# Patient Record
Sex: FEMALE | Race: WHITE | NOT HISPANIC OR LATINO
[De-identification: names, ages, dates, MRNs, and addresses within clinical notes are randomized per-mention and may not be internally consistent; named-entity substitution may affect disease eponyms.]

---

## 2017-01-31 ENCOUNTER — APPOINTMENT (OUTPATIENT)
Dept: GASTROENTEROLOGY | Facility: CLINIC | Age: 42
End: 2017-01-31

## 2017-02-03 ENCOUNTER — APPOINTMENT (OUTPATIENT)
Dept: ORTHOPEDIC SURGERY | Facility: CLINIC | Age: 42
End: 2017-02-03

## 2017-02-13 ENCOUNTER — APPOINTMENT (OUTPATIENT)
Dept: ULTRASOUND IMAGING | Facility: IMAGING CENTER | Age: 42
End: 2017-02-13

## 2017-02-25 ENCOUNTER — APPOINTMENT (OUTPATIENT)
Dept: MAMMOGRAPHY | Facility: IMAGING CENTER | Age: 42
End: 2017-02-25

## 2017-02-27 ENCOUNTER — APPOINTMENT (OUTPATIENT)
Dept: PAIN MANAGEMENT | Facility: CLINIC | Age: 42
End: 2017-02-27

## 2017-02-28 ENCOUNTER — APPOINTMENT (OUTPATIENT)
Dept: PAIN MANAGEMENT | Facility: CLINIC | Age: 42
End: 2017-02-28

## 2017-03-01 ENCOUNTER — APPOINTMENT (OUTPATIENT)
Dept: GASTROENTEROLOGY | Facility: CLINIC | Age: 42
End: 2017-03-01

## 2017-03-08 ENCOUNTER — FORM ENCOUNTER (OUTPATIENT)
Age: 42
End: 2017-03-08

## 2017-03-09 ENCOUNTER — APPOINTMENT (OUTPATIENT)
Dept: MRI IMAGING | Facility: IMAGING CENTER | Age: 42
End: 2017-03-09

## 2017-03-09 ENCOUNTER — OUTPATIENT (OUTPATIENT)
Dept: OUTPATIENT SERVICES | Facility: HOSPITAL | Age: 42
LOS: 1 days | End: 2017-03-09
Payer: COMMERCIAL

## 2017-03-09 DIAGNOSIS — D25.9 LEIOMYOMA OF UTERUS, UNSPECIFIED: Chronic | ICD-10-CM

## 2017-03-09 DIAGNOSIS — Z00.8 ENCOUNTER FOR OTHER GENERAL EXAMINATION: ICD-10-CM

## 2017-03-09 DIAGNOSIS — N84.0 POLYP OF CORPUS UTERI: Chronic | ICD-10-CM

## 2017-03-09 DIAGNOSIS — S82.90XA UNSPECIFIED FRACTURE OF UNSPECIFIED LOWER LEG, INITIAL ENCOUNTER FOR CLOSED FRACTURE: Chronic | ICD-10-CM

## 2017-03-09 DIAGNOSIS — Z98.1 ARTHRODESIS STATUS: Chronic | ICD-10-CM

## 2017-03-09 PROCEDURE — 72148 MRI LUMBAR SPINE W/O DYE: CPT

## 2017-03-24 ENCOUNTER — FORM ENCOUNTER (OUTPATIENT)
Age: 42
End: 2017-03-24

## 2017-03-25 ENCOUNTER — OUTPATIENT (OUTPATIENT)
Dept: OUTPATIENT SERVICES | Facility: HOSPITAL | Age: 42
LOS: 1 days | End: 2017-03-25
Payer: COMMERCIAL

## 2017-03-25 ENCOUNTER — APPOINTMENT (OUTPATIENT)
Dept: ULTRASOUND IMAGING | Facility: IMAGING CENTER | Age: 42
End: 2017-03-25

## 2017-03-25 ENCOUNTER — APPOINTMENT (OUTPATIENT)
Dept: MAMMOGRAPHY | Facility: IMAGING CENTER | Age: 42
End: 2017-03-25

## 2017-03-25 DIAGNOSIS — N84.0 POLYP OF CORPUS UTERI: Chronic | ICD-10-CM

## 2017-03-25 DIAGNOSIS — D25.9 LEIOMYOMA OF UTERUS, UNSPECIFIED: Chronic | ICD-10-CM

## 2017-03-25 DIAGNOSIS — Z98.1 ARTHRODESIS STATUS: Chronic | ICD-10-CM

## 2017-03-25 DIAGNOSIS — S82.90XA UNSPECIFIED FRACTURE OF UNSPECIFIED LOWER LEG, INITIAL ENCOUNTER FOR CLOSED FRACTURE: Chronic | ICD-10-CM

## 2017-03-25 DIAGNOSIS — Z00.8 ENCOUNTER FOR OTHER GENERAL EXAMINATION: ICD-10-CM

## 2017-03-25 PROCEDURE — 76856 US EXAM PELVIC COMPLETE: CPT

## 2017-03-25 PROCEDURE — 76641 ULTRASOUND BREAST COMPLETE: CPT

## 2017-03-25 PROCEDURE — 77067 SCR MAMMO BI INCL CAD: CPT

## 2017-03-25 PROCEDURE — 76830 TRANSVAGINAL US NON-OB: CPT

## 2017-03-25 PROCEDURE — 77063 BREAST TOMOSYNTHESIS BI: CPT

## 2017-03-26 ENCOUNTER — RESULT REVIEW (OUTPATIENT)
Age: 42
End: 2017-03-26

## 2017-03-27 ENCOUNTER — APPOINTMENT (OUTPATIENT)
Dept: GASTROENTEROLOGY | Facility: CLINIC | Age: 42
End: 2017-03-27

## 2017-03-27 ENCOUNTER — APPOINTMENT (OUTPATIENT)
Dept: ORTHOPEDIC SURGERY | Facility: CLINIC | Age: 42
End: 2017-03-27

## 2017-04-13 ENCOUNTER — APPOINTMENT (OUTPATIENT)
Dept: GASTROENTEROLOGY | Facility: CLINIC | Age: 42
End: 2017-04-13

## 2017-04-21 ENCOUNTER — APPOINTMENT (OUTPATIENT)
Dept: PAIN MANAGEMENT | Facility: CLINIC | Age: 42
End: 2017-04-21

## 2017-04-21 VITALS
BODY MASS INDEX: 23.09 KG/M2 | HEART RATE: 76 BPM | WEIGHT: 110 LBS | HEIGHT: 58 IN | SYSTOLIC BLOOD PRESSURE: 110 MMHG | DIASTOLIC BLOOD PRESSURE: 70 MMHG

## 2017-04-21 RX ORDER — ALBUTEROL SULFATE 90 UG/1
108 (90 BASE) AEROSOL, METERED RESPIRATORY (INHALATION)
Qty: 18 | Refills: 0 | Status: DISCONTINUED | COMMUNITY
Start: 2017-03-31

## 2017-04-21 RX ORDER — LANSOPRAZOLE 30 MG/1
30 CAPSULE, DELAYED RELEASE ORAL
Qty: 20 | Refills: 0 | Status: DISCONTINUED | COMMUNITY
Start: 2017-04-04

## 2017-04-21 RX ORDER — CLARITHROMYCIN 500 MG/1
500 TABLET, FILM COATED ORAL
Qty: 20 | Refills: 0 | Status: DISCONTINUED | COMMUNITY
Start: 2017-03-31

## 2017-04-21 RX ORDER — FLUCONAZOLE 150 MG/1
150 TABLET ORAL
Qty: 2 | Refills: 0 | Status: DISCONTINUED | COMMUNITY
Start: 2017-04-09

## 2017-04-21 RX ORDER — OSELTAMIVIR PHOSPHATE 75 MG/1
75 CAPSULE ORAL
Qty: 10 | Refills: 0 | Status: DISCONTINUED | COMMUNITY
Start: 2017-03-27

## 2017-04-21 RX ORDER — AMOXICILLIN 500 MG/1
500 CAPSULE ORAL
Qty: 40 | Refills: 0 | Status: DISCONTINUED | COMMUNITY
Start: 2017-03-31

## 2017-04-26 ENCOUNTER — RESULT REVIEW (OUTPATIENT)
Age: 42
End: 2017-04-26

## 2017-04-27 ENCOUNTER — LABORATORY RESULT (OUTPATIENT)
Age: 42
End: 2017-04-27

## 2017-04-27 ENCOUNTER — APPOINTMENT (OUTPATIENT)
Dept: OBGYN | Facility: CLINIC | Age: 42
End: 2017-04-27

## 2017-04-27 VITALS — WEIGHT: 103 LBS | DIASTOLIC BLOOD PRESSURE: 69 MMHG | BODY MASS INDEX: 21.53 KG/M2 | SYSTOLIC BLOOD PRESSURE: 108 MMHG

## 2017-05-01 ENCOUNTER — APPOINTMENT (OUTPATIENT)
Dept: MRI IMAGING | Facility: CLINIC | Age: 42
End: 2017-05-01

## 2017-05-01 ENCOUNTER — APPOINTMENT (OUTPATIENT)
Dept: ORTHOPEDIC SURGERY | Facility: CLINIC | Age: 42
End: 2017-05-01

## 2017-05-01 VITALS — WEIGHT: 103 LBS | BODY MASS INDEX: 21.62 KG/M2 | HEIGHT: 58 IN

## 2017-05-02 ENCOUNTER — APPOINTMENT (OUTPATIENT)
Dept: GASTROENTEROLOGY | Facility: CLINIC | Age: 42
End: 2017-05-02

## 2017-05-09 ENCOUNTER — APPOINTMENT (OUTPATIENT)
Dept: GASTROENTEROLOGY | Facility: CLINIC | Age: 42
End: 2017-05-09

## 2017-05-16 ENCOUNTER — APPOINTMENT (OUTPATIENT)
Dept: GASTROENTEROLOGY | Facility: CLINIC | Age: 42
End: 2017-05-16

## 2017-05-25 ENCOUNTER — APPOINTMENT (OUTPATIENT)
Dept: OBGYN | Facility: CLINIC | Age: 42
End: 2017-05-25

## 2017-05-30 ENCOUNTER — APPOINTMENT (OUTPATIENT)
Dept: NEUROSURGERY | Facility: CLINIC | Age: 42
End: 2017-05-30

## 2017-06-01 ENCOUNTER — APPOINTMENT (OUTPATIENT)
Dept: OBGYN | Facility: CLINIC | Age: 42
End: 2017-06-01
Payer: MEDICARE

## 2017-06-01 PROCEDURE — 76830 TRANSVAGINAL US NON-OB: CPT

## 2017-07-18 ENCOUNTER — APPOINTMENT (OUTPATIENT)
Dept: GASTROENTEROLOGY | Facility: CLINIC | Age: 42
End: 2017-07-18

## 2017-09-08 ENCOUNTER — APPOINTMENT (OUTPATIENT)
Dept: OBGYN | Facility: CLINIC | Age: 42
End: 2017-09-08
Payer: MEDICARE

## 2017-09-08 ENCOUNTER — ASOB RESULT (OUTPATIENT)
Age: 42
End: 2017-09-08

## 2017-09-08 VITALS
HEIGHT: 58 IN | DIASTOLIC BLOOD PRESSURE: 80 MMHG | BODY MASS INDEX: 21.2 KG/M2 | WEIGHT: 101 LBS | SYSTOLIC BLOOD PRESSURE: 120 MMHG

## 2017-09-08 PROCEDURE — 99213 OFFICE O/P EST LOW 20 MIN: CPT

## 2017-09-08 PROCEDURE — 76830 TRANSVAGINAL US NON-OB: CPT

## 2017-09-22 ENCOUNTER — APPOINTMENT (OUTPATIENT)
Dept: PAIN MANAGEMENT | Facility: CLINIC | Age: 42
End: 2017-09-22
Payer: MEDICARE

## 2017-09-22 VITALS
HEIGHT: 58 IN | BODY MASS INDEX: 21.2 KG/M2 | DIASTOLIC BLOOD PRESSURE: 72 MMHG | HEART RATE: 85 BPM | SYSTOLIC BLOOD PRESSURE: 105 MMHG | WEIGHT: 101 LBS

## 2017-09-22 PROCEDURE — 99213 OFFICE O/P EST LOW 20 MIN: CPT | Mod: 25

## 2017-09-22 PROCEDURE — 64615 CHEMODENERV MUSC MIGRAINE: CPT

## 2017-09-25 ENCOUNTER — RX RENEWAL (OUTPATIENT)
Age: 42
End: 2017-09-25

## 2017-09-27 ENCOUNTER — APPOINTMENT (OUTPATIENT)
Dept: GASTROENTEROLOGY | Facility: CLINIC | Age: 42
End: 2017-09-27
Payer: MEDICARE

## 2017-09-27 PROCEDURE — 45378 DIAGNOSTIC COLONOSCOPY: CPT

## 2017-09-28 ENCOUNTER — APPOINTMENT (OUTPATIENT)
Dept: ULTRASOUND IMAGING | Facility: IMAGING CENTER | Age: 42
End: 2017-09-28
Payer: MEDICARE

## 2017-09-28 ENCOUNTER — OUTPATIENT (OUTPATIENT)
Dept: OUTPATIENT SERVICES | Facility: HOSPITAL | Age: 42
LOS: 1 days | End: 2017-09-28
Payer: COMMERCIAL

## 2017-09-28 DIAGNOSIS — Z00.8 ENCOUNTER FOR OTHER GENERAL EXAMINATION: ICD-10-CM

## 2017-09-28 DIAGNOSIS — N84.0 POLYP OF CORPUS UTERI: Chronic | ICD-10-CM

## 2017-09-28 DIAGNOSIS — Z98.1 ARTHRODESIS STATUS: Chronic | ICD-10-CM

## 2017-09-28 DIAGNOSIS — D25.9 LEIOMYOMA OF UTERUS, UNSPECIFIED: Chronic | ICD-10-CM

## 2017-09-28 DIAGNOSIS — S82.90XA UNSPECIFIED FRACTURE OF UNSPECIFIED LOWER LEG, INITIAL ENCOUNTER FOR CLOSED FRACTURE: Chronic | ICD-10-CM

## 2017-09-28 PROCEDURE — 76642 ULTRASOUND BREAST LIMITED: CPT | Mod: 26,50

## 2017-09-28 PROCEDURE — 76642 ULTRASOUND BREAST LIMITED: CPT

## 2017-10-10 ENCOUNTER — OUTPATIENT (OUTPATIENT)
Dept: OUTPATIENT SERVICES | Facility: HOSPITAL | Age: 42
LOS: 1 days | End: 2017-10-10
Payer: COMMERCIAL

## 2017-10-10 ENCOUNTER — TRANSCRIPTION ENCOUNTER (OUTPATIENT)
Age: 42
End: 2017-10-10

## 2017-10-10 VITALS
HEIGHT: 59 IN | RESPIRATION RATE: 16 BRPM | HEART RATE: 68 BPM | OXYGEN SATURATION: 100 % | DIASTOLIC BLOOD PRESSURE: 63 MMHG | TEMPERATURE: 99 F | WEIGHT: 104.94 LBS | SYSTOLIC BLOOD PRESSURE: 108 MMHG

## 2017-10-10 DIAGNOSIS — R10.2 PELVIC AND PERINEAL PAIN: ICD-10-CM

## 2017-10-10 DIAGNOSIS — Z01.812 ENCOUNTER FOR PREPROCEDURAL LABORATORY EXAMINATION: ICD-10-CM

## 2017-10-10 DIAGNOSIS — S82.90XA UNSPECIFIED FRACTURE OF UNSPECIFIED LOWER LEG, INITIAL ENCOUNTER FOR CLOSED FRACTURE: Chronic | ICD-10-CM

## 2017-10-10 DIAGNOSIS — Z98.890 OTHER SPECIFIED POSTPROCEDURAL STATES: Chronic | ICD-10-CM

## 2017-10-10 DIAGNOSIS — D25.9 LEIOMYOMA OF UTERUS, UNSPECIFIED: Chronic | ICD-10-CM

## 2017-10-10 DIAGNOSIS — N84.0 POLYP OF CORPUS UTERI: Chronic | ICD-10-CM

## 2017-10-10 DIAGNOSIS — Z98.1 ARTHRODESIS STATUS: Chronic | ICD-10-CM

## 2017-10-10 DIAGNOSIS — Z98.51 TUBAL LIGATION STATUS: Chronic | ICD-10-CM

## 2017-10-10 PROCEDURE — G0463: CPT

## 2017-10-10 PROCEDURE — 93005 ELECTROCARDIOGRAM TRACING: CPT

## 2017-10-10 RX ORDER — SODIUM CHLORIDE 9 MG/ML
3 INJECTION INTRAMUSCULAR; INTRAVENOUS; SUBCUTANEOUS EVERY 8 HOURS
Qty: 0 | Refills: 0 | Status: DISCONTINUED | OUTPATIENT
Start: 2017-10-17 | End: 2017-10-25

## 2017-10-10 NOTE — H&P PST ADULT - PMH
Fibrocystic breast    Fibromyalgia    Herniated lumbar intervertebral disc    Migraines    Ovarian cyst    Psoriasis    Tibial fracture    Uterine fibroid    Uterine polyp Endometriosis    Fibrocystic breast    Fibromyalgia    Herniated lumbar intervertebral disc    Migraines    Ovarian cyst    Psoriasis    Tibial fracture    Uterine fibroid    Uterine polyp

## 2017-10-10 NOTE — H&P PST ADULT - HISTORY OF PRESENT ILLNESS
42year old female with pmhx of fibromyalgia, migraine headaches, uterine fibroids, ovarian cysts, lumbar herniated disc and psoriasis presents with c/o lower abdominal pain x 1month that is not relieved with analgesics. Patient is here today for presurgical testing for scheduled diagnostic laparoscopy, laparoscopic uterosacral nerve ablation on 10/17/17 42year old female with pmhx of fibromyalgia, migraine headaches, endometriosis, uterine fibroids, ovarian cysts, lumbar herniated disc and psoriasis presents with c/o lower abdominal pain x 1month that is not relieved with analgesics. Patient is here today for presurgical testing for scheduled diagnostic laparoscopy, laparoscopic uterosacral nerve ablation on 10/17/17

## 2017-10-10 NOTE — H&P PST ADULT - FAMILY HISTORY
Father  Still living? Yes, Estimated age: Age Unknown  Family history of hypertension, Age at diagnosis: Age Unknown     Mother  Still living? Yes, Estimated age: Age Unknown  Diabetes mellitus, type 2, Age at diagnosis: Age Unknown

## 2017-10-17 ENCOUNTER — OUTPATIENT (OUTPATIENT)
Dept: OUTPATIENT SERVICES | Facility: HOSPITAL | Age: 42
LOS: 1 days | Discharge: HOME | End: 2017-10-17
Payer: COMMERCIAL

## 2017-10-17 ENCOUNTER — TRANSCRIPTION ENCOUNTER (OUTPATIENT)
Age: 42
End: 2017-10-17

## 2017-10-17 ENCOUNTER — APPOINTMENT (OUTPATIENT)
Dept: OBGYN | Facility: HOSPITAL | Age: 42
End: 2017-10-17

## 2017-10-17 VITALS
SYSTOLIC BLOOD PRESSURE: 102 MMHG | HEART RATE: 71 BPM | WEIGHT: 104.94 LBS | HEIGHT: 59 IN | DIASTOLIC BLOOD PRESSURE: 68 MMHG | OXYGEN SATURATION: 100 % | RESPIRATION RATE: 16 BRPM | TEMPERATURE: 97 F

## 2017-10-17 VITALS
OXYGEN SATURATION: 99 % | RESPIRATION RATE: 12 BRPM | DIASTOLIC BLOOD PRESSURE: 58 MMHG | TEMPERATURE: 98 F | HEART RATE: 77 BPM | SYSTOLIC BLOOD PRESSURE: 101 MMHG

## 2017-10-17 DIAGNOSIS — R10.2 PELVIC AND PERINEAL PAIN: ICD-10-CM

## 2017-10-17 DIAGNOSIS — S82.90XA UNSPECIFIED FRACTURE OF UNSPECIFIED LOWER LEG, INITIAL ENCOUNTER FOR CLOSED FRACTURE: Chronic | ICD-10-CM

## 2017-10-17 DIAGNOSIS — Z98.890 OTHER SPECIFIED POSTPROCEDURAL STATES: Chronic | ICD-10-CM

## 2017-10-17 DIAGNOSIS — Z98.51 TUBAL LIGATION STATUS: Chronic | ICD-10-CM

## 2017-10-17 DIAGNOSIS — D25.9 LEIOMYOMA OF UTERUS, UNSPECIFIED: Chronic | ICD-10-CM

## 2017-10-17 DIAGNOSIS — N84.0 POLYP OF CORPUS UTERI: Chronic | ICD-10-CM

## 2017-10-17 DIAGNOSIS — Z98.1 ARTHRODESIS STATUS: Chronic | ICD-10-CM

## 2017-10-17 LAB
APTT BLD: 31.6 SEC — SIGNIFICANT CHANGE UP (ref 27.5–37.4)
HCG UR QL: NEGATIVE — SIGNIFICANT CHANGE UP
INR BLD: 1.03 RATIO — SIGNIFICANT CHANGE UP (ref 0.88–1.16)
PROTHROM AB SERPL-ACNC: 11.2 SEC — SIGNIFICANT CHANGE UP (ref 9.8–12.7)

## 2017-10-17 PROCEDURE — 58578 UNLISTED LAPS PX UTERUS: CPT

## 2017-10-17 PROCEDURE — 86900 BLOOD TYPING SEROLOGIC ABO: CPT

## 2017-10-17 PROCEDURE — 86901 BLOOD TYPING SEROLOGIC RH(D): CPT

## 2017-10-17 PROCEDURE — 86850 RBC ANTIBODY SCREEN: CPT

## 2017-10-17 PROCEDURE — 85610 PROTHROMBIN TIME: CPT

## 2017-10-17 PROCEDURE — 81025 URINE PREGNANCY TEST: CPT

## 2017-10-17 PROCEDURE — 85730 THROMBOPLASTIN TIME PARTIAL: CPT

## 2017-10-17 RX ORDER — ACETAMINOPHEN 500 MG
1000 TABLET ORAL ONCE
Qty: 0 | Refills: 0 | Status: COMPLETED | OUTPATIENT
Start: 2017-10-17 | End: 2017-10-17

## 2017-10-17 RX ORDER — KETOROLAC TROMETHAMINE 30 MG/ML
30 SYRINGE (ML) INJECTION ONCE
Qty: 0 | Refills: 0 | Status: DISCONTINUED | OUTPATIENT
Start: 2017-10-17 | End: 2017-10-25

## 2017-10-17 RX ORDER — OXYCODONE AND ACETAMINOPHEN 5; 325 MG/1; MG/1
2 TABLET ORAL EVERY 4 HOURS
Qty: 0 | Refills: 0 | Status: DISCONTINUED | OUTPATIENT
Start: 2017-10-17 | End: 2017-10-17

## 2017-10-17 RX ORDER — CHOLECALCIFEROL (VITAMIN D3) 125 MCG
1 CAPSULE ORAL
Qty: 0 | Refills: 0 | COMMUNITY

## 2017-10-17 RX ORDER — EVENING PRIMROSE OIL 500 MG
0 CAPSULE ORAL
Qty: 0 | Refills: 0 | COMMUNITY

## 2017-10-17 RX ORDER — ONDANSETRON 8 MG/1
4 TABLET, FILM COATED ORAL ONCE
Qty: 0 | Refills: 0 | Status: COMPLETED | OUTPATIENT
Start: 2017-10-17 | End: 2017-10-17

## 2017-10-17 RX ORDER — HYDROMORPHONE HYDROCHLORIDE 2 MG/ML
0.5 INJECTION INTRAMUSCULAR; INTRAVENOUS; SUBCUTANEOUS
Qty: 0 | Refills: 0 | Status: DISCONTINUED | OUTPATIENT
Start: 2017-10-17 | End: 2017-10-17

## 2017-10-17 RX ORDER — SODIUM CHLORIDE 9 MG/ML
250 INJECTION, SOLUTION INTRAVENOUS ONCE
Qty: 0 | Refills: 0 | Status: COMPLETED | OUTPATIENT
Start: 2017-10-17 | End: 2017-10-17

## 2017-10-17 RX ORDER — MOXIFLOXACIN HYDROCHLORIDE TABLETS, 400 MG 400 MG/1
1 TABLET, FILM COATED ORAL
Qty: 14 | Refills: 0
Start: 2017-10-17 | End: 2017-10-24

## 2017-10-17 RX ADMIN — HYDROMORPHONE HYDROCHLORIDE 0.5 MILLIGRAM(S): 2 INJECTION INTRAMUSCULAR; INTRAVENOUS; SUBCUTANEOUS at 15:16

## 2017-10-17 RX ADMIN — Medication 400 MILLIGRAM(S): at 14:56

## 2017-10-17 RX ADMIN — SODIUM CHLORIDE 1000 MILLILITER(S): 9 INJECTION, SOLUTION INTRAVENOUS at 17:10

## 2017-10-17 RX ADMIN — ONDANSETRON 4 MILLIGRAM(S): 8 TABLET, FILM COATED ORAL at 15:18

## 2017-10-17 RX ADMIN — SODIUM CHLORIDE 3 MILLILITER(S): 9 INJECTION INTRAMUSCULAR; INTRAVENOUS; SUBCUTANEOUS at 11:46

## 2017-10-17 RX ADMIN — HYDROMORPHONE HYDROCHLORIDE 0.5 MILLIGRAM(S): 2 INJECTION INTRAMUSCULAR; INTRAVENOUS; SUBCUTANEOUS at 16:24

## 2017-10-17 RX ADMIN — Medication 1000 MILLIGRAM(S): at 15:20

## 2017-10-17 NOTE — ASU DISCHARGE PLAN (ADULT/PEDIATRIC). - NOTIFY
Pain not relieved by Medications/Unable to Urinate/Bleeding that does not stop/Persistent Nausea and Vomiting/Inability to Tolerate Liquids or Foods/GYN Fever>100.4

## 2017-10-17 NOTE — ASU DISCHARGE PLAN (ADULT/PEDIATRIC). - MEDICATION SUMMARY - MEDICATIONS TO TAKE
I will START or STAY ON the medications listed below when I get home from the hospital:    Aleve 220 mg oral capsule  -- 2 cap(s) by mouth every 12 hours, As Needed  -- Indication: For Pain    Cipro 500 mg oral tablet  -- 1 tab(s) by mouth every 12 hours   -- Avoid prolonged or excessive exposure to direct and/or artificial sunlight while taking this medication.  Check with your doctor before becoming pregnant.  Do not take dairy products, antacids, or iron preparations within one hour of this medication.  Finish all this medication unless otherwise directed by prescriber.  Medication should be taken with plenty of water.    -- Indication: For Prophylaxis

## 2017-10-17 NOTE — ASU PATIENT PROFILE, ADULT - PSH
Fibroids  removal- June 2014  History of bilateral tubal ligation    History of dilation and curettage    History of myomectomy    Lower leg fracture  bilateral tib fib fx surgery- 1999  S/P spinal fusion  and laminectomy with hardware- 2009  Uterine polyp  lap uterine polyp removal- january 2014

## 2017-10-17 NOTE — ASU PATIENT PROFILE, ADULT - PMH
Endometriosis    Fibrocystic breast    Fibromyalgia    Herniated lumbar intervertebral disc    Migraines    Ovarian cyst    Psoriasis    Tibial fracture    Uterine fibroid    Uterine polyp

## 2017-10-17 NOTE — BRIEF OPERATIVE NOTE - PROCEDURE
<<-----Click on this checkbox to enter Procedure Diagnostic laparoscopy for chronic pelvic pain  10/17/2017  + uterosacral nerve ablation  Active  [id number]

## 2017-10-17 NOTE — ASU DISCHARGE PLAN (ADULT/PEDIATRIC). - ACTIVITY LEVEL
no douching/no intercourse/no heavy lifting/no weight bearing/no sports/gym/no exercise/no tub baths/nothing per vagina/nothing per rectum/no tampons

## 2017-10-17 NOTE — BRIEF OPERATIVE NOTE - OPERATION/FINDINGS
6wk size myomatous uterus w/ some powder burn lesions consistent w/ endometriosis,  unremarkable bilateral adnexa 6wk size myomatous uterus w/ some powder burn lesions consistent w/ endometriosis,  unremarkable bilateral adnexa    there was perforation of uterus at right fundal area w/ humi manipulator during insertion, at end of case humi manipulator was removed and perforated area was observed under lower pressure and was not bleeding.

## 2017-10-25 DIAGNOSIS — N80.0 ENDOMETRIOSIS OF UTERUS: ICD-10-CM

## 2017-10-25 DIAGNOSIS — Z87.891 PERSONAL HISTORY OF NICOTINE DEPENDENCE: ICD-10-CM

## 2017-11-01 ENCOUNTER — APPOINTMENT (OUTPATIENT)
Dept: OBGYN | Facility: CLINIC | Age: 42
End: 2017-11-01
Payer: MEDICARE

## 2017-11-01 VITALS — WEIGHT: 102 LBS | SYSTOLIC BLOOD PRESSURE: 100 MMHG | BODY MASS INDEX: 21.32 KG/M2 | DIASTOLIC BLOOD PRESSURE: 70 MMHG

## 2017-11-01 PROCEDURE — 99024 POSTOP FOLLOW-UP VISIT: CPT

## 2017-11-22 ENCOUNTER — RX RENEWAL (OUTPATIENT)
Age: 42
End: 2017-11-22

## 2018-01-03 ENCOUNTER — APPOINTMENT (OUTPATIENT)
Dept: PAIN MANAGEMENT | Facility: CLINIC | Age: 43
End: 2018-01-03
Payer: MEDICARE

## 2018-01-03 VITALS
WEIGHT: 102 LBS | DIASTOLIC BLOOD PRESSURE: 72 MMHG | BODY MASS INDEX: 21.41 KG/M2 | SYSTOLIC BLOOD PRESSURE: 112 MMHG | HEART RATE: 76 BPM | HEIGHT: 58 IN

## 2018-01-03 PROCEDURE — 64615 CHEMODENERV MUSC MIGRAINE: CPT

## 2018-01-03 PROCEDURE — 99213 OFFICE O/P EST LOW 20 MIN: CPT | Mod: 25

## 2018-02-09 ENCOUNTER — APPOINTMENT (OUTPATIENT)
Dept: PAIN MANAGEMENT | Facility: CLINIC | Age: 43
End: 2018-02-09
Payer: MEDICARE

## 2018-02-09 VITALS
HEIGHT: 58 IN | WEIGHT: 102 LBS | BODY MASS INDEX: 21.41 KG/M2 | DIASTOLIC BLOOD PRESSURE: 77 MMHG | HEART RATE: 80 BPM | SYSTOLIC BLOOD PRESSURE: 112 MMHG

## 2018-02-09 PROCEDURE — 99213 OFFICE O/P EST LOW 20 MIN: CPT

## 2018-02-09 RX ORDER — AMOXICILLIN 875 MG/1
875 TABLET, FILM COATED ORAL
Qty: 10 | Refills: 0 | Status: DISCONTINUED | COMMUNITY
Start: 2017-09-11

## 2018-02-09 RX ORDER — FLUCONAZOLE 150 MG/1
150 TABLET ORAL
Qty: 1 | Refills: 3 | Status: DISCONTINUED | COMMUNITY
Start: 2017-04-27 | End: 2018-02-09

## 2018-02-12 ENCOUNTER — APPOINTMENT (OUTPATIENT)
Dept: ORTHOPEDIC SURGERY | Facility: CLINIC | Age: 43
End: 2018-02-12
Payer: MEDICARE

## 2018-02-12 VITALS — SYSTOLIC BLOOD PRESSURE: 129 MMHG | HEART RATE: 62 BPM | DIASTOLIC BLOOD PRESSURE: 84 MMHG

## 2018-02-12 DIAGNOSIS — M54.5 LOW BACK PAIN: ICD-10-CM

## 2018-02-12 PROCEDURE — 20600 DRAIN/INJ JOINT/BURSA W/O US: CPT

## 2018-02-12 PROCEDURE — 99214 OFFICE O/P EST MOD 30 MIN: CPT | Mod: 25

## 2018-03-05 ENCOUNTER — OTHER (OUTPATIENT)
Age: 43
End: 2018-03-05

## 2018-04-06 ENCOUNTER — APPOINTMENT (OUTPATIENT)
Dept: PAIN MANAGEMENT | Facility: CLINIC | Age: 43
End: 2018-04-06
Payer: MEDICARE

## 2018-04-06 VITALS
HEIGHT: 59 IN | DIASTOLIC BLOOD PRESSURE: 78 MMHG | BODY MASS INDEX: 20.56 KG/M2 | HEART RATE: 79 BPM | SYSTOLIC BLOOD PRESSURE: 122 MMHG | WEIGHT: 102 LBS

## 2018-04-06 PROCEDURE — 64615 CHEMODENERV MUSC MIGRAINE: CPT

## 2018-04-06 PROCEDURE — 99213 OFFICE O/P EST LOW 20 MIN: CPT | Mod: 25

## 2018-04-13 ENCOUNTER — APPOINTMENT (OUTPATIENT)
Dept: MAMMOGRAPHY | Facility: CLINIC | Age: 43
End: 2018-04-13
Payer: MEDICARE

## 2018-04-13 ENCOUNTER — OUTPATIENT (OUTPATIENT)
Dept: OUTPATIENT SERVICES | Facility: HOSPITAL | Age: 43
LOS: 1 days | End: 2018-04-13
Payer: COMMERCIAL

## 2018-04-13 ENCOUNTER — APPOINTMENT (OUTPATIENT)
Dept: ULTRASOUND IMAGING | Facility: CLINIC | Age: 43
End: 2018-04-13
Payer: MEDICARE

## 2018-04-13 DIAGNOSIS — D25.9 LEIOMYOMA OF UTERUS, UNSPECIFIED: Chronic | ICD-10-CM

## 2018-04-13 DIAGNOSIS — Z98.1 ARTHRODESIS STATUS: Chronic | ICD-10-CM

## 2018-04-13 DIAGNOSIS — Z98.890 OTHER SPECIFIED POSTPROCEDURAL STATES: Chronic | ICD-10-CM

## 2018-04-13 DIAGNOSIS — S82.90XA UNSPECIFIED FRACTURE OF UNSPECIFIED LOWER LEG, INITIAL ENCOUNTER FOR CLOSED FRACTURE: Chronic | ICD-10-CM

## 2018-04-13 DIAGNOSIS — Z12.31 ENCOUNTER FOR SCREENING MAMMOGRAM FOR MALIGNANT NEOPLASM OF BREAST: ICD-10-CM

## 2018-04-13 DIAGNOSIS — Z98.51 TUBAL LIGATION STATUS: Chronic | ICD-10-CM

## 2018-04-13 DIAGNOSIS — N84.0 POLYP OF CORPUS UTERI: Chronic | ICD-10-CM

## 2018-04-13 DIAGNOSIS — Z00.8 ENCOUNTER FOR OTHER GENERAL EXAMINATION: ICD-10-CM

## 2018-04-13 PROCEDURE — 77063 BREAST TOMOSYNTHESIS BI: CPT | Mod: 26

## 2018-04-13 PROCEDURE — 76641 ULTRASOUND BREAST COMPLETE: CPT

## 2018-04-13 PROCEDURE — 77063 BREAST TOMOSYNTHESIS BI: CPT

## 2018-04-13 PROCEDURE — 76641 ULTRASOUND BREAST COMPLETE: CPT | Mod: 26,50

## 2018-04-13 PROCEDURE — 77067 SCR MAMMO BI INCL CAD: CPT | Mod: 26

## 2018-04-13 PROCEDURE — 77067 SCR MAMMO BI INCL CAD: CPT

## 2018-05-14 NOTE — ASU DISCHARGE PLAN (ADULT/PEDIATRIC). - APPT TIME
1week Fusiform Excision Additional Text (Leave Blank If You Do Not Want): The margin was drawn around the clinically apparent lesion.  A fusiform shape was then drawn on the skin incorporating the lesion and margins.  Incisions were then made along these lines to the appropriate tissue plane and the lesion was extirpated.

## 2018-05-18 ENCOUNTER — APPOINTMENT (OUTPATIENT)
Dept: PAIN MANAGEMENT | Facility: CLINIC | Age: 43
End: 2018-05-18

## 2018-05-29 ENCOUNTER — LABORATORY RESULT (OUTPATIENT)
Age: 43
End: 2018-05-29

## 2018-05-30 ENCOUNTER — APPOINTMENT (OUTPATIENT)
Dept: OBGYN | Facility: CLINIC | Age: 43
End: 2018-05-30
Payer: MEDICARE

## 2018-05-30 VITALS
DIASTOLIC BLOOD PRESSURE: 80 MMHG | WEIGHT: 105 LBS | BODY MASS INDEX: 21.17 KG/M2 | HEIGHT: 59 IN | SYSTOLIC BLOOD PRESSURE: 120 MMHG

## 2018-05-30 PROCEDURE — 99396 PREV VISIT EST AGE 40-64: CPT

## 2018-07-09 ENCOUNTER — APPOINTMENT (OUTPATIENT)
Dept: PAIN MANAGEMENT | Facility: CLINIC | Age: 43
End: 2018-07-09
Payer: MEDICARE

## 2018-07-09 VITALS
HEART RATE: 83 BPM | HEIGHT: 59 IN | SYSTOLIC BLOOD PRESSURE: 106 MMHG | DIASTOLIC BLOOD PRESSURE: 69 MMHG | BODY MASS INDEX: 21.77 KG/M2 | WEIGHT: 108 LBS

## 2018-07-09 DIAGNOSIS — M54.2 CERVICALGIA: ICD-10-CM

## 2018-07-09 PROCEDURE — 64615 CHEMODENERV MUSC MIGRAINE: CPT

## 2018-07-09 PROCEDURE — 99213 OFFICE O/P EST LOW 20 MIN: CPT | Mod: 25

## 2018-09-19 PROBLEM — S82.209A UNSPECIFIED FRACTURE OF SHAFT OF UNSPECIFIED TIBIA, INITIAL ENCOUNTER FOR CLOSED FRACTURE: Chronic | Status: ACTIVE | Noted: 2017-10-10

## 2018-09-19 PROBLEM — N60.19 DIFFUSE CYSTIC MASTOPATHY OF UNSPECIFIED BREAST: Chronic | Status: ACTIVE | Noted: 2017-10-10

## 2018-09-19 PROBLEM — M51.26 OTHER INTERVERTEBRAL DISC DISPLACEMENT, LUMBAR REGION: Chronic | Status: ACTIVE | Noted: 2017-10-10

## 2018-09-19 PROBLEM — L40.9 PSORIASIS, UNSPECIFIED: Chronic | Status: ACTIVE | Noted: 2017-10-10

## 2018-09-19 PROBLEM — N83.209 UNSPECIFIED OVARIAN CYST, UNSPECIFIED SIDE: Chronic | Status: ACTIVE | Noted: 2017-10-10

## 2018-09-19 PROBLEM — D25.9 LEIOMYOMA OF UTERUS, UNSPECIFIED: Chronic | Status: ACTIVE | Noted: 2017-10-10

## 2018-09-19 PROBLEM — N80.9 ENDOMETRIOSIS, UNSPECIFIED: Chronic | Status: ACTIVE | Noted: 2017-10-10

## 2018-09-27 ENCOUNTER — APPOINTMENT (OUTPATIENT)
Dept: NEUROLOGY | Facility: CLINIC | Age: 43
End: 2018-09-27
Payer: MEDICARE

## 2018-09-27 VITALS
WEIGHT: 105 LBS | DIASTOLIC BLOOD PRESSURE: 79 MMHG | BODY MASS INDEX: 21.17 KG/M2 | SYSTOLIC BLOOD PRESSURE: 107 MMHG | HEIGHT: 59 IN | HEART RATE: 70 BPM

## 2018-09-27 PROCEDURE — 64615 CHEMODENERV MUSC MIGRAINE: CPT

## 2018-09-27 PROCEDURE — 99212 OFFICE O/P EST SF 10 MIN: CPT | Mod: 25

## 2018-10-08 ENCOUNTER — APPOINTMENT (OUTPATIENT)
Dept: PAIN MANAGEMENT | Facility: CLINIC | Age: 43
End: 2018-10-08

## 2018-10-16 ENCOUNTER — ASOB RESULT (OUTPATIENT)
Age: 43
End: 2018-10-16

## 2018-10-16 ENCOUNTER — APPOINTMENT (OUTPATIENT)
Dept: OBGYN | Facility: CLINIC | Age: 43
End: 2018-10-16
Payer: MEDICARE

## 2018-10-16 VITALS
BODY MASS INDEX: 21.17 KG/M2 | SYSTOLIC BLOOD PRESSURE: 110 MMHG | WEIGHT: 105 LBS | HEIGHT: 59 IN | DIASTOLIC BLOOD PRESSURE: 80 MMHG

## 2018-10-16 PROCEDURE — 76830 TRANSVAGINAL US NON-OB: CPT

## 2018-10-16 PROCEDURE — 99213 OFFICE O/P EST LOW 20 MIN: CPT

## 2018-11-16 ENCOUNTER — APPOINTMENT (OUTPATIENT)
Dept: ORTHOPEDIC SURGERY | Facility: CLINIC | Age: 43
End: 2018-11-16
Payer: MEDICARE

## 2018-11-16 VITALS
BODY MASS INDEX: 20.56 KG/M2 | WEIGHT: 102 LBS | SYSTOLIC BLOOD PRESSURE: 101 MMHG | HEIGHT: 59 IN | DIASTOLIC BLOOD PRESSURE: 64 MMHG | HEART RATE: 84 BPM

## 2018-11-16 DIAGNOSIS — G89.29 PAIN IN LEFT KNEE: ICD-10-CM

## 2018-11-16 DIAGNOSIS — M25.562 PAIN IN LEFT KNEE: ICD-10-CM

## 2018-11-16 PROCEDURE — 73564 X-RAY EXAM KNEE 4 OR MORE: CPT | Mod: LT

## 2018-11-16 PROCEDURE — 99214 OFFICE O/P EST MOD 30 MIN: CPT

## 2019-01-14 ENCOUNTER — EMERGENCY (EMERGENCY)
Facility: HOSPITAL | Age: 44
LOS: 1 days | Discharge: HOME | End: 2019-01-14
Attending: EMERGENCY MEDICINE
Payer: MEDICARE

## 2019-01-14 VITALS
HEART RATE: 76 BPM | OXYGEN SATURATION: 98 % | DIASTOLIC BLOOD PRESSURE: 85 MMHG | WEIGHT: 102.07 LBS | RESPIRATION RATE: 19 BRPM | TEMPERATURE: 98 F | SYSTOLIC BLOOD PRESSURE: 131 MMHG | HEIGHT: 59 IN

## 2019-01-14 DIAGNOSIS — N84.0 POLYP OF CORPUS UTERI: Chronic | ICD-10-CM

## 2019-01-14 DIAGNOSIS — Z98.890 OTHER SPECIFIED POSTPROCEDURAL STATES: Chronic | ICD-10-CM

## 2019-01-14 DIAGNOSIS — S82.90XA UNSPECIFIED FRACTURE OF UNSPECIFIED LOWER LEG, INITIAL ENCOUNTER FOR CLOSED FRACTURE: Chronic | ICD-10-CM

## 2019-01-14 DIAGNOSIS — D25.9 LEIOMYOMA OF UTERUS, UNSPECIFIED: Chronic | ICD-10-CM

## 2019-01-14 DIAGNOSIS — Z98.1 ARTHRODESIS STATUS: Chronic | ICD-10-CM

## 2019-01-14 DIAGNOSIS — Z98.51 TUBAL LIGATION STATUS: Chronic | ICD-10-CM

## 2019-01-14 PROCEDURE — 12002 RPR S/N/AX/GEN/TRNK2.6-7.5CM: CPT | Mod: RT

## 2019-01-14 PROCEDURE — 99282 EMERGENCY DEPT VISIT SF MDM: CPT | Mod: 25

## 2019-01-14 PROCEDURE — 99283 EMERGENCY DEPT VISIT LOW MDM: CPT | Mod: 25

## 2019-01-14 PROCEDURE — 12002 RPR S/N/AX/GEN/TRNK2.6-7.5CM: CPT

## 2019-01-14 NOTE — ED PROVIDER NOTE - ATTENDING CONTRIBUTION TO CARE
I performed a history and physical exam of the patient and discussed their management with the resident. I reviewed the resident's note and agree with the documented findings and plan of care, except if noted below. My medical decision making and observations can be found be found below.    42 yo female presents with superficial laceration to right hand. TDAP UTD. Cap refill < 2 secs. Motor and sensation intact. Will clean, explore wound, likely lac repair and dc home.

## 2019-01-14 NOTE — ED PROVIDER NOTE - PHYSICAL EXAMINATION
Gen: well appearing, AAOx3, NAD   Head: NCAT  ENT: Airway patent, moist mucous membranes  Cardiac: Normal rate, normal rhythm  Respiratory: Lungs CTA B/L  MSK: No gross abnormalities, FROM of all four extremities, no edema  HEME: Extremities warm, cap refill < 2 sec  Skin: + 2cm superficial lac at the medial dorsal aspect of the right hand   Neuro: No gross neurologic deficits Gen: well appearing, AAOx3, NAD   Head: NCAT  ENT: Airway patent, moist mucous membranes  Cardiac: Normal rate, normal rhythm  Respiratory: Lungs CTA B/L  MSK: No gross abnormalities, FROM of all four extremities, no edema  HEME: Extremities warm, cap refill < 2 sec  Skin: + 3cm superficial lac at the medial dorsal aspect of the right hand   Neuro: No gross neurologic deficits

## 2019-01-14 NOTE — ED PROVIDER NOTE - NSFOLLOWUPINSTRUCTIONS_ED_ALL_ED_FT
You were seen today for a hand laceration of your right hand, you had 4 sutures placed 5-0 ethilon, to be removed in 8-10 days. Please return to the emergency room for evaluation if your hand develops pus, redness, or severe pain or difficulty moving, please return as this may indicate and infection.       See your primary care doctor for evaluation within 3 days.

## 2019-01-14 NOTE — ED ADULT NURSE NOTE - OBJECTIVE STATEMENT
42 y/o female presenting to ED for right hand laceration. Pt states "I cut my hand on glass today." Upon exam pt Pt AOx3 breathing even unlabored spontaneously NAD resting comfortably in stretcher, +ROM in all fingers, +radial pulses  , cap refill <2 seconds, wound is open, minimal bleeding noted, pt applying pressure with gauze. Last tetanus July 2018

## 2019-01-14 NOTE — ED PROVIDER NOTE - OBJECTIVE STATEMENT
43F no pmhx, right handed, presenting with right hand lac from washing dishes wherein her hand slipped against a broken cup. Had her tetanus this year. No AC, only takes naproxen for pain control.

## 2019-01-14 NOTE — ED ADULT NURSE NOTE - NSIMPLEMENTINTERV_GEN_ALL_ED
Implemented All Universal Safety Interventions:  Assumption to call system. Call bell, personal items and telephone within reach. Instruct patient to call for assistance. Room bathroom lighting operational. Non-slip footwear when patient is off stretcher. Physically safe environment: no spills, clutter or unnecessary equipment. Stretcher in lowest position, wheels locked, appropriate side rails in place.

## 2019-01-14 NOTE — ED PROVIDER NOTE - MEDICAL DECISION MAKING DETAILS
43F w/ hand lac, very superficial without gap not in area of tension, likely can dermabond, tetanus UTD

## 2019-01-18 ENCOUNTER — OUTPATIENT (OUTPATIENT)
Dept: OUTPATIENT SERVICES | Facility: HOSPITAL | Age: 44
LOS: 1 days | End: 2019-01-18
Payer: MEDICARE

## 2019-01-18 DIAGNOSIS — N84.0 POLYP OF CORPUS UTERI: Chronic | ICD-10-CM

## 2019-01-18 DIAGNOSIS — Z98.51 TUBAL LIGATION STATUS: Chronic | ICD-10-CM

## 2019-01-18 DIAGNOSIS — Z98.890 OTHER SPECIFIED POSTPROCEDURAL STATES: Chronic | ICD-10-CM

## 2019-01-18 DIAGNOSIS — Z98.1 ARTHRODESIS STATUS: Chronic | ICD-10-CM

## 2019-01-18 DIAGNOSIS — D25.9 LEIOMYOMA OF UTERUS, UNSPECIFIED: Chronic | ICD-10-CM

## 2019-01-18 DIAGNOSIS — S82.90XA UNSPECIFIED FRACTURE OF UNSPECIFIED LOWER LEG, INITIAL ENCOUNTER FOR CLOSED FRACTURE: Chronic | ICD-10-CM

## 2019-01-18 PROCEDURE — 73501 X-RAY EXAM HIP UNI 1 VIEW: CPT | Mod: 26,LT

## 2019-01-18 PROCEDURE — 73501 X-RAY EXAM HIP UNI 1 VIEW: CPT

## 2019-03-11 ENCOUNTER — APPOINTMENT (OUTPATIENT)
Dept: NEUROLOGY | Facility: CLINIC | Age: 44
End: 2019-03-11
Payer: MEDICARE

## 2019-03-11 VITALS
WEIGHT: 105 LBS | HEIGHT: 59 IN | BODY MASS INDEX: 21.17 KG/M2 | SYSTOLIC BLOOD PRESSURE: 104 MMHG | DIASTOLIC BLOOD PRESSURE: 69 MMHG | HEART RATE: 70 BPM

## 2019-03-11 PROCEDURE — 64615 CHEMODENERV MUSC MIGRAINE: CPT

## 2019-03-11 PROCEDURE — 99212 OFFICE O/P EST SF 10 MIN: CPT | Mod: 25

## 2019-03-11 RX ORDER — FROVATRIPTAN SUCCINATE 2.5 MG/1
2.5 TABLET, FILM COATED ORAL
Qty: 8 | Refills: 0 | Status: DISCONTINUED | COMMUNITY
Start: 2018-02-20 | End: 2019-03-11

## 2019-03-11 NOTE — HISTORY OF PRESENT ILLNESS
[FreeTextEntry1] : 43-year-old woman who did well with her last botox injection. She requests refills on her triptan

## 2019-04-15 ENCOUNTER — APPOINTMENT (OUTPATIENT)
Dept: ULTRASOUND IMAGING | Facility: IMAGING CENTER | Age: 44
End: 2019-04-15
Payer: MEDICARE

## 2019-04-15 ENCOUNTER — APPOINTMENT (OUTPATIENT)
Dept: MAMMOGRAPHY | Facility: IMAGING CENTER | Age: 44
End: 2019-04-15
Payer: MEDICARE

## 2019-04-15 ENCOUNTER — OUTPATIENT (OUTPATIENT)
Dept: OUTPATIENT SERVICES | Facility: HOSPITAL | Age: 44
LOS: 1 days | End: 2019-04-15
Payer: MEDICARE

## 2019-04-15 DIAGNOSIS — Z98.1 ARTHRODESIS STATUS: Chronic | ICD-10-CM

## 2019-04-15 DIAGNOSIS — N84.0 POLYP OF CORPUS UTERI: Chronic | ICD-10-CM

## 2019-04-15 DIAGNOSIS — Z00.8 ENCOUNTER FOR OTHER GENERAL EXAMINATION: ICD-10-CM

## 2019-04-15 DIAGNOSIS — Z98.51 TUBAL LIGATION STATUS: Chronic | ICD-10-CM

## 2019-04-15 DIAGNOSIS — D25.9 LEIOMYOMA OF UTERUS, UNSPECIFIED: Chronic | ICD-10-CM

## 2019-04-15 DIAGNOSIS — Z98.890 OTHER SPECIFIED POSTPROCEDURAL STATES: Chronic | ICD-10-CM

## 2019-04-15 DIAGNOSIS — S82.90XA UNSPECIFIED FRACTURE OF UNSPECIFIED LOWER LEG, INITIAL ENCOUNTER FOR CLOSED FRACTURE: Chronic | ICD-10-CM

## 2019-04-15 PROCEDURE — 76641 ULTRASOUND BREAST COMPLETE: CPT

## 2019-04-15 PROCEDURE — 77067 SCR MAMMO BI INCL CAD: CPT | Mod: 26

## 2019-04-15 PROCEDURE — 77063 BREAST TOMOSYNTHESIS BI: CPT | Mod: 26

## 2019-04-15 PROCEDURE — 77067 SCR MAMMO BI INCL CAD: CPT

## 2019-04-15 PROCEDURE — 76641 ULTRASOUND BREAST COMPLETE: CPT | Mod: 26,50

## 2019-04-15 PROCEDURE — 77063 BREAST TOMOSYNTHESIS BI: CPT

## 2019-05-07 ENCOUNTER — APPOINTMENT (OUTPATIENT)
Dept: OBGYN | Facility: CLINIC | Age: 44
End: 2019-05-07
Payer: MEDICARE

## 2019-05-07 ENCOUNTER — LABORATORY RESULT (OUTPATIENT)
Age: 44
End: 2019-05-07

## 2019-05-07 ENCOUNTER — ASOB RESULT (OUTPATIENT)
Age: 44
End: 2019-05-07

## 2019-05-07 VITALS — SYSTOLIC BLOOD PRESSURE: 111 MMHG | WEIGHT: 107 LBS | BODY MASS INDEX: 21.61 KG/M2 | DIASTOLIC BLOOD PRESSURE: 68 MMHG

## 2019-05-07 PROCEDURE — 76830 TRANSVAGINAL US NON-OB: CPT

## 2019-05-07 PROCEDURE — 99396 PREV VISIT EST AGE 40-64: CPT

## 2019-05-07 RX ORDER — CIPROFLOXACIN HYDROCHLORIDE 500 MG/1
500 TABLET, FILM COATED ORAL
Qty: 14 | Refills: 0 | Status: COMPLETED | COMMUNITY
Start: 2017-10-18 | End: 2019-05-07

## 2019-05-07 RX ORDER — AZITHROMYCIN 250 MG/1
250 TABLET, FILM COATED ORAL
Qty: 6 | Refills: 0 | Status: COMPLETED | COMMUNITY
Start: 2017-12-11 | End: 2019-05-07

## 2019-05-07 RX ORDER — NITROFURANTOIN (MONOHYDRATE/MACROCRYSTALS) 25; 75 MG/1; MG/1
100 CAPSULE ORAL
Qty: 10 | Refills: 0 | Status: COMPLETED | COMMUNITY
Start: 2018-10-16 | End: 2019-05-07

## 2019-05-07 RX ORDER — TERCONAZOLE 8 MG/G
0.8 CREAM VAGINAL
Qty: 1 | Refills: 4 | Status: COMPLETED | COMMUNITY
Start: 2017-04-27 | End: 2019-05-07

## 2019-05-07 RX ORDER — AMOXICILLIN 500 MG/1
500 TABLET, FILM COATED ORAL
Qty: 21 | Refills: 0 | Status: COMPLETED | COMMUNITY
Start: 2017-10-11 | End: 2019-05-07

## 2019-05-07 NOTE — PHYSICAL EXAM
[Awake] : awake [Alert] : alert [Mass] : no breast mass [Acute Distress] : no acute distress [Nipple Discharge] : no nipple discharge [Axillary LAD] : no axillary lymphadenopathy [Tender] : non tender [Soft] : soft [Oriented x3] : oriented to person, place, and time [Normal] : cervix [No Bleeding] : there was no active vaginal bleeding [Uterine Adnexae] : were not tender and not enlarged

## 2019-05-07 NOTE — COUNSELING
[Nutrition] : nutrition [Breast Self Exam] : breast self exam [Exercise] : exercise [STD (testing, results, tx)] : STD (testing, results, tx) [Vitamins/Supplements] : vitamins/supplements [Drugs/Alcohol] : drugs/alcohol [Menstrual Calendar] : menstrual calendar

## 2019-06-10 ENCOUNTER — APPOINTMENT (OUTPATIENT)
Dept: NEUROLOGY | Facility: CLINIC | Age: 44
End: 2019-06-10
Payer: MEDICARE

## 2019-06-10 VITALS
BODY MASS INDEX: 21.17 KG/M2 | HEART RATE: 66 BPM | WEIGHT: 105 LBS | HEIGHT: 59 IN | DIASTOLIC BLOOD PRESSURE: 68 MMHG | SYSTOLIC BLOOD PRESSURE: 105 MMHG

## 2019-06-10 PROCEDURE — 64615 CHEMODENERV MUSC MIGRAINE: CPT

## 2019-06-10 PROCEDURE — 99212 OFFICE O/P EST SF 10 MIN: CPT | Mod: 25

## 2019-06-10 NOTE — PHYSICAL EXAM
[General Appearance - Alert] : alert [Oriented To Time, Place, And Person] : oriented to person, place, and time [Person] : oriented to person

## 2019-06-10 NOTE — HISTORY OF PRESENT ILLNESS
[FreeTextEntry1] : 43-year-old woman who is in the middle of property [place] with her sister over her mom's belongings after mom's death. She had 2 migraines since her last visit. It's likely due to the stress.

## 2019-06-19 ENCOUNTER — APPOINTMENT (OUTPATIENT)
Dept: OBGYN | Facility: CLINIC | Age: 44
End: 2019-06-19
Payer: MEDICARE

## 2019-06-19 PROCEDURE — 51784 ANAL/URINARY MUSCLE STUDY: CPT

## 2019-06-19 PROCEDURE — 51741 ELECTRO-UROFLOWMETRY FIRST: CPT

## 2019-06-19 PROCEDURE — 51729 CYSTOMETROGRAM W/VP&UP: CPT

## 2019-07-11 ENCOUNTER — TRANSCRIPTION ENCOUNTER (OUTPATIENT)
Age: 44
End: 2019-07-11

## 2019-07-16 ENCOUNTER — LABORATORY RESULT (OUTPATIENT)
Age: 44
End: 2019-07-16

## 2019-07-17 ENCOUNTER — APPOINTMENT (OUTPATIENT)
Dept: OBGYN | Facility: CLINIC | Age: 44
End: 2019-07-17
Payer: MEDICARE

## 2019-07-17 PROCEDURE — 99213 OFFICE O/P EST LOW 20 MIN: CPT

## 2019-08-15 ENCOUNTER — APPOINTMENT (OUTPATIENT)
Dept: OBGYN | Facility: CLINIC | Age: 44
End: 2019-08-15
Payer: MEDICARE

## 2019-08-15 VITALS — DIASTOLIC BLOOD PRESSURE: 68 MMHG | WEIGHT: 105 LBS | SYSTOLIC BLOOD PRESSURE: 110 MMHG | BODY MASS INDEX: 21.21 KG/M2

## 2019-08-15 DIAGNOSIS — N83.201 UNSPECIFIED OVARIAN CYST, RIGHT SIDE: ICD-10-CM

## 2019-08-15 DIAGNOSIS — Z87.440 PERSONAL HISTORY OF URINARY (TRACT) INFECTIONS: ICD-10-CM

## 2019-08-15 PROCEDURE — 99213 OFFICE O/P EST LOW 20 MIN: CPT

## 2019-09-12 ENCOUNTER — APPOINTMENT (OUTPATIENT)
Dept: OBGYN | Facility: CLINIC | Age: 44
End: 2019-09-12

## 2019-09-19 ENCOUNTER — APPOINTMENT (OUTPATIENT)
Dept: OBGYN | Facility: CLINIC | Age: 44
End: 2019-09-19

## 2019-10-18 ENCOUNTER — APPOINTMENT (OUTPATIENT)
Dept: OBGYN | Facility: CLINIC | Age: 44
End: 2019-10-18

## 2019-11-07 ENCOUNTER — APPOINTMENT (OUTPATIENT)
Dept: NEUROLOGY | Facility: CLINIC | Age: 44
End: 2019-11-07
Payer: MEDICARE

## 2019-11-07 VITALS
HEIGHT: 59 IN | DIASTOLIC BLOOD PRESSURE: 73 MMHG | SYSTOLIC BLOOD PRESSURE: 109 MMHG | HEART RATE: 81 BPM | WEIGHT: 100 LBS | BODY MASS INDEX: 20.16 KG/M2

## 2019-11-07 PROCEDURE — 64615 CHEMODENERV MUSC MIGRAINE: CPT

## 2019-11-07 PROCEDURE — 99212 OFFICE O/P EST SF 10 MIN: CPT | Mod: 25

## 2019-11-07 NOTE — HISTORY OF PRESENT ILLNESS
[FreeTextEntry1] : 44-year-old woman who is in the middle of [place] with her sister over her mom's belongings after mom's death. She had missed her last botox appt because of the ongoing [place]. She is planning to go to [place] for some paperwork.

## 2019-11-07 NOTE — PROCEDURE
[FreeTextEntry1] : consent obtained\par safety precautions discussed.\par botox 200 U\par 5u per muscle site: \par procerusX1\par corrugatorX2\par frontalis X4\par temporalis X8\par occipitalisX6\par trapeziusX6\par 155 u total were injected \par 45 u were discarded.\par pt tolerated procedure well.\par \par

## 2019-11-27 ENCOUNTER — APPOINTMENT (OUTPATIENT)
Dept: OBGYN | Facility: CLINIC | Age: 44
End: 2019-11-27

## 2019-12-01 ENCOUNTER — RESULT REVIEW (OUTPATIENT)
Age: 44
End: 2019-12-01

## 2019-12-03 ENCOUNTER — APPOINTMENT (OUTPATIENT)
Dept: ULTRASOUND IMAGING | Facility: CLINIC | Age: 44
End: 2019-12-03
Payer: MEDICARE

## 2019-12-03 ENCOUNTER — APPOINTMENT (OUTPATIENT)
Dept: MAMMOGRAPHY | Facility: CLINIC | Age: 44
End: 2019-12-03
Payer: MEDICARE

## 2019-12-03 ENCOUNTER — OUTPATIENT (OUTPATIENT)
Dept: OUTPATIENT SERVICES | Facility: HOSPITAL | Age: 44
LOS: 1 days | End: 2019-12-03
Payer: MEDICARE

## 2019-12-03 DIAGNOSIS — Z98.51 TUBAL LIGATION STATUS: Chronic | ICD-10-CM

## 2019-12-03 DIAGNOSIS — Z98.890 OTHER SPECIFIED POSTPROCEDURAL STATES: Chronic | ICD-10-CM

## 2019-12-03 DIAGNOSIS — N84.0 POLYP OF CORPUS UTERI: Chronic | ICD-10-CM

## 2019-12-03 DIAGNOSIS — Z00.8 ENCOUNTER FOR OTHER GENERAL EXAMINATION: ICD-10-CM

## 2019-12-03 DIAGNOSIS — Z98.1 ARTHRODESIS STATUS: Chronic | ICD-10-CM

## 2019-12-03 DIAGNOSIS — D25.9 LEIOMYOMA OF UTERUS, UNSPECIFIED: Chronic | ICD-10-CM

## 2019-12-03 DIAGNOSIS — S82.90XA UNSPECIFIED FRACTURE OF UNSPECIFIED LOWER LEG, INITIAL ENCOUNTER FOR CLOSED FRACTURE: Chronic | ICD-10-CM

## 2019-12-03 PROCEDURE — 76642 ULTRASOUND BREAST LIMITED: CPT

## 2019-12-03 PROCEDURE — G0279: CPT | Mod: 26

## 2019-12-03 PROCEDURE — 76642 ULTRASOUND BREAST LIMITED: CPT | Mod: 26,RT

## 2019-12-03 PROCEDURE — 77065 DX MAMMO INCL CAD UNI: CPT

## 2019-12-03 PROCEDURE — 77065 DX MAMMO INCL CAD UNI: CPT | Mod: 26,RT

## 2019-12-03 PROCEDURE — G0279: CPT

## 2020-02-03 ENCOUNTER — APPOINTMENT (OUTPATIENT)
Dept: NEUROLOGY | Facility: CLINIC | Age: 45
End: 2020-02-03
Payer: MEDICARE

## 2020-02-03 VITALS — HEIGHT: 59 IN | WEIGHT: 105 LBS | BODY MASS INDEX: 21.17 KG/M2

## 2020-02-03 PROCEDURE — 64615 CHEMODENERV MUSC MIGRAINE: CPT

## 2020-02-03 PROCEDURE — 99212 OFFICE O/P EST SF 10 MIN: CPT | Mod: 25

## 2020-02-03 NOTE — HISTORY OF PRESENT ILLNESS
[FreeTextEntry1] : 44-year-old woman who is in the middle of [place] with her sister over her mom's belongings after mom's death. She is here for repeat botox injections.

## 2020-04-09 ENCOUNTER — APPOINTMENT (OUTPATIENT)
Dept: NEUROLOGY | Facility: CLINIC | Age: 45
End: 2020-04-09
Payer: MEDICARE

## 2020-04-09 PROCEDURE — 99214 OFFICE O/P EST MOD 30 MIN: CPT | Mod: 95

## 2020-04-09 NOTE — PHYSICAL EXAM
[Person] : oriented to person [Place] : oriented to place [Time] : oriented to time [Short Term Intact] : short term memory intact [Fluency] : fluency intact [Current Events] : adequate knowledge of current events [Cranial Nerves Oculomotor (III)] : extraocular motion intact [Cranial Nerves Facial (VII)] : face symmetrical [Cranial Nerves Vestibulocochlear (VIII)] : hearing was intact bilaterally [Cranial Nerves Accessory (XI - Cranial And Spinal)] : head turning and shoulder shrug symmetric [Cranial Nerves Hypoglossal (XII)] : there was no tongue deviation with protrusion [Abnormal Walk] : normal gait [Paresis Pronator Drift Right-Sided] : no pronator drift on the right [Paresis Pronator Drift Left-Sided] : no pronator drift on the left [Coordination - Dysmetria Impaired Finger-to-Nose Bilateral] : not present

## 2020-04-09 NOTE — HISTORY OF PRESENT ILLNESS
[FreeTextEntry1] : verbal consent given on 04/09/2020 and 14:57  by HERNAN ABREU at [address]\[place] [address]\par \par She is consulted for migraine. She had last botox in Feb and is due for repeat one in May, however, the pandemic will likely not be over then. We discussed and she is agreeable to changing it to aimovig. SE were discussed with patient in detail. She was advised that we do not know the long term side effects of the medication however, benefits outweigh the risk during the pandemic. \par \par CONSENT FOR VIDEO MEDICAL VISIT1. I understand that my physician wishes me to engage in a telemedicine consultation.2. My physician has explained to me how the video conferencing technology will be used to affect such a consultation will not be the same as a direct patient/health care provider visit due to the fact that I will not be in the same room as my physician 3. I understand there are potential risks to this technology, including interruptions, unauthorized access and technical difficulties. I understand that my health care provider or I can discontinue the telemedicine consult/visit if it is felt that the videoconferencing connections are not adequate for the situation.4. I understand that my healthcare information may be shared with other individuals for scheduling and billing purposes. Others may also be present during the consultation other than my physician and consulting health care provider in order to operate the video equipment. The above mentioned people will all maintain confidentiality of the information obtained. I further understand that I will be informed of their presence in the consultation and thus will have the right to request the following: (1) omit specific details of my medical history/physical examination that are personally sensitive to me; (2) ask non-medical personnel to leave the telemedicine examination room: and or (3) terminate the consultation at any time.5. I have had the alternatives to a telemedicine consultation explained to me, and in choosing to participate in a telemedicine consultation. I understand that some parts of the exam involving physical tests may be conducted by individuals at my location at the direction of the consulting health care provider.6. In an emergent consultation, I understand that the responsibility of the telemedicine consulting specialist is to advise my local practitioner and that the specialist’s responsibility will conclude upon the termination of the video conference connection.7. I understand that billing will occur from both my physician and as a facility fee from the site from which I am presented.8. I have had a direct conversation with my physician, during which I had the opportunity to ask questions in regard to this procedure. My questions have been answered and the risks, benefits and any practical alternatives have been discussed with me in a language in which I understand\par

## 2020-04-09 NOTE — DISCUSSION/SUMMARY
[FreeTextEntry1] : 44 W who is here for followup of her migraines. She is staying safe during this pandemic. will start aimovig and stop botox.\par \par physician location: home\par patient location: home\par \par I spent 25 minutes of face to face time, during which more than 50% of the time was spent on counseling. I explained the diagnosis, other possible diagnoses, workup, and management, as well as answered any questions.\par \par This is a telemedicine visit that was performed using real time 2-way audiovisual technology. Verbal consent to participate in video visit was obtained and patient was aware of their right to refuse to participate in services delivered via telemedicine and the alternative and potential limitations of participating in a telemedicine visit vs a face-to-face visit; I have also informed the patient of my current location in the [facility] and the names of all persons participating in the telemedicine service and their role in the encounter. The patient agrees to have this service via Telehealth.\par \par  This visit occurred during the Coronavirus (COVID-19) Public Health Emergency. I discussed with the patient the nature of our telemedicine visits, that: \par • I would evaluate the patient and recommend diagnostics and treatments based on my assessment \par • Our sessions are not being recorded and that personal health information is protected \par • Our team would provide follow up care in person if/when the patient needs it.\par

## 2020-04-09 NOTE — HISTORY OF PRESENT ILLNESS
[FreeTextEntry1] : verbal consent given on 04/09/2020 and 14:57  by HERNAN ABREU at [address]\[place] [address]\par \par She is consulted for migraine. She had last botox in Feb and is due for repeat one in May, however, the pandemic will likely not be over then. We discussed and she is agreeable to changing it to aimovig. SE were discussed with patient in detail. She was advised that we do not know the long term side effects of the medication however, benefits outweigh the risk during the pandemic. \par \par CONSENT FOR VIDEO MEDICAL VISIT1. I understand that my physician wishes me to engage in a telemedicine consultation.2. My physician has explained to me how the video conferencing technology will be used to affect such a consultation will not be the same as a direct patient/health care provider visit due to the fact that I will not be in the same room as my physician 3. I understand there are potential risks to this technology, including interruptions, unauthorized access and technical difficulties. I understand that my health care provider or I can discontinue the telemedicine consult/visit if it is felt that the videoconferencing connections are not adequate for the situation.4. I understand that my healthcare information may be shared with other individuals for scheduling and billing purposes. Others may also be present during the consultation other than my physician and consulting health care provider in order to operate the video equipment. The above mentioned people will all maintain confidentiality of the information obtained. I further understand that I will be informed of their presence in the consultation and thus will have the right to request the following: (1) omit specific details of my medical history/physical examination that are personally sensitive to me; (2) ask non-medical personnel to leave the telemedicine examination room: and or (3) terminate the consultation at any time.5. I have had the alternatives to a telemedicine consultation explained to me, and in choosing to participate in a telemedicine consultation. I understand that some parts of the exam involving physical tests may be conducted by individuals at my location at the direction of the consulting health care provider.6. In an emergent consultation, I understand that the responsibility of the telemedicine consulting specialist is to advise my local practitioner and that the specialist’s responsibility will conclude upon the termination of the video conference connection.7. I understand that billing will occur from both my physician and as a facility fee from the site from which I am presented.8. I have had a direct conversation with my physician, during which I had the opportunity to ask questions in regard to this procedure. My questions have been answered and the risks, benefits and any practical alternatives have been discussed with me in a language in which I understand\par

## 2020-04-09 NOTE — DISCUSSION/SUMMARY
[FreeTextEntry1] : 44 W who is here for followup of her migraines. She is staying safe during this pandemic. will start aimovig and stop botox.\par \par physician location: home\par patient location: home\par \par I spent 25 minutes of face to face time, during which more than 50% of the time was spent on counseling. I explained the diagnosis, other possible diagnoses, workup, and management, as well as answered any questions.\par \par This is a telemedicine visit that was performed using real time 2-way audiovisual technology. Verbal consent to participate in video visit was obtained and patient was aware of their right to refuse to participate in services delivered via telemedicine and the alternative and potential limitations of participating in a telemedicine visit vs a face-to-face visit; I have also informed the patient of my current location in the [facility] and the names of all persons participating in the telemedicine service and their role in the encounter. The patient agrees to have this service via Telehealth.\par \par  This visit occurred during the Coronavirus (COVID-19) Public Health Emergency. I discussed with the patient the nature of our telemedicine visits, that: \par • I would evaluate the patient and recommend diagnostics and treatments based on my assessment \par • Our sessions are not being recorded and that personal health information is protected \par • Our team would provide follow up care in person if/when the patient needs it.\par

## 2020-04-24 ENCOUNTER — APPOINTMENT (OUTPATIENT)
Dept: NEUROLOGY | Facility: CLINIC | Age: 45
End: 2020-04-24

## 2020-04-28 ENCOUNTER — APPOINTMENT (OUTPATIENT)
Dept: NEUROLOGY | Facility: CLINIC | Age: 45
End: 2020-04-28
Payer: MEDICARE

## 2020-04-28 PROCEDURE — 99214 OFFICE O/P EST MOD 30 MIN: CPT | Mod: 95

## 2020-04-28 NOTE — DISCUSSION/SUMMARY
[FreeTextEntry1] : 44 W who requested guidance during her first injection of aimovig. She was observed and she tolerated the procedure well. She will reinject on 5/28. SE were reviewed with the patient again.\par \par physician location: home\par patient location: home\par \par I spent 25 minutes of face to face time, during which more than 50% of the time was spent on counseling. I explained the diagnosis, other possible diagnoses, workup, and management, as well as answered any questions.\par \par This is a telemedicine visit that was performed using real time 2-way audiovisual technology. Verbal consent to participate in video visit was obtained and patient was aware of their right to refuse to participate in services delivered via telemedicine and the alternative and potential limitations of participating in a telemedicine visit vs a face-to-face visit; I have also informed the patient of my current location in the [facility] and the names of all persons participating in the telemedicine service and their role in the encounter. The patient agrees to have this service via Telehealth.\par \par  This visit occurred during the Coronavirus (COVID-19) Public Health Emergency. I discussed with the patient the nature of our telemedicine visits, that: \par • I would evaluate the patient and recommend diagnostics and treatments based on my assessment \par • Our sessions are not being recorded and that personal health information is protected \par • Our team would provide follow up care in person if/when the patient needs it.\par

## 2020-04-28 NOTE — HISTORY OF PRESENT ILLNESS
[FreeTextEntry1] : verbal consent given on 04/28/2020 and 13:35  by HERNAN ABREU at [address]\[place] [address]\par \par 44 W who requests a visit so that I can walk her through her first injection of Aimovig.\par \par CONSENT FOR VIDEO MEDICAL VISIT1. I understand that my physician wishes me to engage in a telemedicine consultation.2. My physician has explained to me how the video conferencing technology will be used to affect such a consultation will not be the same as a direct patient/health care provider visit due to the fact that I will not be in the same room as my physician 3. I understand there are potential risks to this technology, including interruptions, unauthorized access and technical difficulties. I understand that my health care provider or I can discontinue the telemedicine consult/visit if it is felt that the videoconferencing connections are not adequate for the situation.4. I understand that my healthcare information may be shared with other individuals for scheduling and billing purposes. Others may also be present during the consultation other than my physician and consulting health care provider in order to operate the video equipment. The above mentioned people will all maintain confidentiality of the information obtained. I further understand that I will be informed of their presence in the consultation and thus will have the right to request the following: (1) omit specific details of my medical history/physical examination that are personally sensitive to me; (2) ask non-medical personnel to leave the telemedicine examination room: and or (3) terminate the consultation at any time.5. I have had the alternatives to a telemedicine consultation explained to me, and in choosing to participate in a telemedicine consultation. I understand that some parts of the exam involving physical tests may be conducted by individuals at my location at the direction of the consulting health care provider.6. In an emergent consultation, I understand that the responsibility of the telemedicine consulting specialist is to advise my local practitioner and that the specialist’s responsibility will conclude upon the termination of the video conference connection.7. I understand that billing will occur from both my physician and as a facility fee from the site from which I am presented.8. I have had a direct conversation with my physician, during which I had the opportunity to ask questions in regard to this procedure. My questions have been answered and the risks, benefits and any practical alternatives have been discussed with me in a language in which I understand\par

## 2020-04-28 NOTE — PHYSICAL EXAM
[Person] : oriented to person [Place] : oriented to place [Short Term Intact] : short term memory intact [Time] : oriented to time [Fluency] : fluency intact [Cranial Nerves Oculomotor (III)] : extraocular motion intact [Current Events] : adequate knowledge of current events [Cranial Nerves Vestibulocochlear (VIII)] : hearing was intact bilaterally [Cranial Nerves Facial (VII)] : face symmetrical [Paresis Pronator Drift Right-Sided] : no pronator drift on the right [Cranial Nerves Accessory (XI - Cranial And Spinal)] : head turning and shoulder shrug symmetric [Cranial Nerves Hypoglossal (XII)] : there was no tongue deviation with protrusion [Paresis Pronator Drift Left-Sided] : no pronator drift on the left [Abnormal Walk] : normal gait [Coordination - Dysmetria Impaired Finger-to-Nose Bilateral] : not present

## 2020-05-04 ENCOUNTER — APPOINTMENT (OUTPATIENT)
Dept: NEUROLOGY | Facility: CLINIC | Age: 45
End: 2020-05-04

## 2020-05-28 ENCOUNTER — APPOINTMENT (OUTPATIENT)
Dept: NEUROLOGY | Facility: CLINIC | Age: 45
End: 2020-05-28
Payer: MEDICARE

## 2020-05-28 PROCEDURE — 99441: CPT

## 2020-06-19 ENCOUNTER — APPOINTMENT (OUTPATIENT)
Dept: NEUROLOGY | Facility: CLINIC | Age: 45
End: 2020-06-19
Payer: MEDICARE

## 2020-06-19 VITALS — TEMPERATURE: 98.1 F

## 2020-06-19 PROCEDURE — 99214 OFFICE O/P EST MOD 30 MIN: CPT | Mod: 25

## 2020-06-19 NOTE — PROCEDURE
[FreeTextEntry1] : consent obtained\par \par 1cc of lidocaine was injected on bilateral occipital region. total of 4 injections, bilateral temporalis muscle, bilateral \par

## 2020-06-19 NOTE — DISCUSSION/SUMMARY
[FreeTextEntry1] : If aimovig does not help her symptoms after one more month, may change to ajovy or emgality. \par \par More than 50% of time spent on counseling and educate patient on differential, workup, disease course, and treatment/management. Education was provided to the patient during this encounter. All questions and concerns were answered and addressed in detail. The patient verbalized understanding and agreed to plan. Patient was advised to continue to monitor for neurologic symptoms and to notify my office or go to the nearest emergency room if there are any changes. \par Side effects of the above medications were discussed in detail including but not limited to applicable black box warning and teratogenicity as appropriate. \par Patient was advised to bring previous records to my office. \par \par

## 2020-06-19 NOTE — HISTORY OF PRESENT ILLNESS
[FreeTextEntry1] : 44 W who is on aimovig for chronic migraine. She states she has had migraine exacerbation. She was a last minute add on to see if nerve block can help her symptoms.

## 2020-08-25 ENCOUNTER — RX RENEWAL (OUTPATIENT)
Age: 45
End: 2020-08-25

## 2020-10-22 ENCOUNTER — APPOINTMENT (OUTPATIENT)
Dept: GASTROENTEROLOGY | Facility: CLINIC | Age: 45
End: 2020-10-22

## 2020-10-23 ENCOUNTER — APPOINTMENT (OUTPATIENT)
Dept: WOUND CARE | Facility: CLINIC | Age: 45
End: 2020-10-23

## 2020-10-29 ENCOUNTER — OUTPATIENT (OUTPATIENT)
Dept: OUTPATIENT SERVICES | Facility: HOSPITAL | Age: 45
LOS: 1 days | End: 2020-10-29
Payer: MEDICARE

## 2020-10-29 ENCOUNTER — APPOINTMENT (OUTPATIENT)
Dept: MAMMOGRAPHY | Facility: HOSPITAL | Age: 45
End: 2020-10-29
Payer: MEDICARE

## 2020-10-29 ENCOUNTER — APPOINTMENT (OUTPATIENT)
Dept: ULTRASOUND IMAGING | Facility: HOSPITAL | Age: 45
End: 2020-10-29
Payer: MEDICARE

## 2020-10-29 DIAGNOSIS — Z98.51 TUBAL LIGATION STATUS: Chronic | ICD-10-CM

## 2020-10-29 DIAGNOSIS — Z98.890 OTHER SPECIFIED POSTPROCEDURAL STATES: Chronic | ICD-10-CM

## 2020-10-29 DIAGNOSIS — Z00.8 ENCOUNTER FOR OTHER GENERAL EXAMINATION: ICD-10-CM

## 2020-10-29 DIAGNOSIS — D25.9 LEIOMYOMA OF UTERUS, UNSPECIFIED: Chronic | ICD-10-CM

## 2020-10-29 DIAGNOSIS — S82.90XA UNSPECIFIED FRACTURE OF UNSPECIFIED LOWER LEG, INITIAL ENCOUNTER FOR CLOSED FRACTURE: Chronic | ICD-10-CM

## 2020-10-29 DIAGNOSIS — Z98.1 ARTHRODESIS STATUS: Chronic | ICD-10-CM

## 2020-10-29 DIAGNOSIS — N84.0 POLYP OF CORPUS UTERI: Chronic | ICD-10-CM

## 2020-10-29 PROCEDURE — 77066 DX MAMMO INCL CAD BI: CPT

## 2020-10-29 PROCEDURE — G0279: CPT | Mod: 26

## 2020-10-29 PROCEDURE — 76641 ULTRASOUND BREAST COMPLETE: CPT

## 2020-10-29 PROCEDURE — 77066 DX MAMMO INCL CAD BI: CPT | Mod: 26

## 2020-10-29 PROCEDURE — 76641 ULTRASOUND BREAST COMPLETE: CPT | Mod: 26,50

## 2020-10-29 PROCEDURE — G0279: CPT

## 2020-12-21 PROBLEM — Z87.440 HISTORY OF URINARY TRACT INFECTION: Status: RESOLVED | Noted: 2019-08-15 | Resolved: 2020-12-21

## 2021-01-07 ENCOUNTER — APPOINTMENT (OUTPATIENT)
Dept: ORTHOPEDIC SURGERY | Facility: CLINIC | Age: 46
End: 2021-01-07

## 2021-02-05 ENCOUNTER — RX RENEWAL (OUTPATIENT)
Age: 46
End: 2021-02-05

## 2021-03-24 ENCOUNTER — APPOINTMENT (OUTPATIENT)
Dept: NEUROLOGY | Facility: CLINIC | Age: 46
End: 2021-03-24
Payer: MEDICARE

## 2021-03-24 PROCEDURE — 99213 OFFICE O/P EST LOW 20 MIN: CPT | Mod: 95

## 2021-03-24 RX ORDER — ERENUMAB-AOOE 140 MG/ML
140 INJECTION, SOLUTION SUBCUTANEOUS
Qty: 1 | Refills: 3 | Status: DISCONTINUED | COMMUNITY
Start: 2020-04-09 | End: 2021-03-24

## 2021-03-24 NOTE — HISTORY OF PRESENT ILLNESS
[FreeTextEntry1] : verbal consent given on 03/24/2021 and 12:14  by HERNAN ABREU at [address] [place] [address]\[place] [address]\par \par 45-year-old woman who is here for follow-up of her chronic migraine.  Since June patient has been doing well on the Aimovig however over the past month patient has recurrence of her migraine despite the Aimovig.  Patient has had no medication changes or infection that may account for her migraine exacerbation.\par \par CONSENT FOR VIDEO MEDICAL VISIT1. I understand that my physician wishes me to engage in a telemedicine consultation.2. My physician has explained to me how the video conferencing technology will be used to affect such a consultation will not be the same as a direct patient/health care provider visit due to the fact that I will not be in the same room as my physician 3. I understand there are potential risks to this technology, including interruptions, unauthorized access and technical difficulties. I understand that my health care provider or I can discontinue the telemedicine consult/visit if it is felt that the videoconferencing connections are not adequate for the situation.4. I understand that my healthcare information may be shared with other individuals for scheduling and billing purposes. Others may also be present during the consultation other than my physician and consulting health care provider in order to operate the video equipment. The above mentioned people will all maintain confidentiality of the information obtained. I further understand that I will be informed of their presence in the consultation and thus will have the right to request the following: (1) omit specific details of my medical history/physical examination that are personally sensitive to me; (2) ask non-medical personnel to leave the telemedicine examination room: and or (3) terminate the consultation at any time.5. I have had the alternatives to a telemedicine consultation explained to me, and in choosing to participate in a telemedicine consultation. I understand that some parts of the exam involving physical tests may be conducted by individuals at my location at the direction of the consulting health care provider.6. In an emergent consultation, I understand that the responsibility of the telemedicine consulting specialist is to advise my local practitioner and that the specialist’s responsibility will conclude upon the termination of the video conference connection.7. I understand that billing will occur from both my physician and as a facility fee from the site from which I am presented.8. I have had a direct conversation with my physician, during which I had the opportunity to ask questions in regard to this procedure. My questions have been answered and the risks, benefits and any practical alternatives have been discussed with me in a language in which I understand\par

## 2021-03-24 NOTE — DISCUSSION/SUMMARY
[FreeTextEntry1] : 45-year-old woman who is here for follow-up of her chronic migraine.  Patient will be changed from Aimovig to Ajovy for prevention of her chronic migraine.  Side effects were discussed with the patient in detail.  Patient will also try a prednisone taper as abortive therapy for her migraine exacerbation.  She will follow-up with me in a few weeks\par \par physician location: office\par patient location: home\par \par I spent  20 minutes of total time, during which more than 50% of the time was spent on counseling. I explained the diagnosis, other possible diagnoses, workup, and management, as well as answered any questions.\par \par This is a telemedicine visit that was performed using real time 2-way audiovisual technology. Verbal consent to participate in video visit was obtained and patient was aware of their right to refuse to participate in services delivered via telemedicine and the alternative and potential limitations of participating in a telemedicine visit vs a face-to-face visit; I have also informed the patient of my current location in the [facility] and the names of all persons participating in the telemedicine service and their role in the encounter. The patient agrees to have this service via Telehealth.\par \par  This visit occurred during the Coronavirus (COVID-19) Public Health Emergency. I discussed with the patient the nature of our telemedicine visits, that: \par • I would evaluate the patient and recommend diagnostics and treatments based on my assessment \par • Our sessions are not being recorded and that personal health information is protected \par • Our team would provide follow up care in person if/when the patient needs it.\par

## 2021-04-12 ENCOUNTER — APPOINTMENT (OUTPATIENT)
Dept: ULTRASOUND IMAGING | Facility: CLINIC | Age: 46
End: 2021-04-12
Payer: MEDICARE

## 2021-04-12 ENCOUNTER — OUTPATIENT (OUTPATIENT)
Dept: OUTPATIENT SERVICES | Facility: HOSPITAL | Age: 46
LOS: 1 days | End: 2021-04-12
Payer: MEDICARE

## 2021-04-12 ENCOUNTER — APPOINTMENT (OUTPATIENT)
Dept: MAMMOGRAPHY | Facility: CLINIC | Age: 46
End: 2021-04-12
Payer: MEDICARE

## 2021-04-12 DIAGNOSIS — N64.4 MASTODYNIA: ICD-10-CM

## 2021-04-12 DIAGNOSIS — Z98.890 OTHER SPECIFIED POSTPROCEDURAL STATES: Chronic | ICD-10-CM

## 2021-04-12 DIAGNOSIS — Z98.51 TUBAL LIGATION STATUS: Chronic | ICD-10-CM

## 2021-04-12 DIAGNOSIS — D25.9 LEIOMYOMA OF UTERUS, UNSPECIFIED: Chronic | ICD-10-CM

## 2021-04-12 DIAGNOSIS — Z98.1 ARTHRODESIS STATUS: Chronic | ICD-10-CM

## 2021-04-12 DIAGNOSIS — N84.0 POLYP OF CORPUS UTERI: Chronic | ICD-10-CM

## 2021-04-12 DIAGNOSIS — S82.90XA UNSPECIFIED FRACTURE OF UNSPECIFIED LOWER LEG, INITIAL ENCOUNTER FOR CLOSED FRACTURE: Chronic | ICD-10-CM

## 2021-04-12 PROCEDURE — 76642 ULTRASOUND BREAST LIMITED: CPT | Mod: 26,LT

## 2021-04-12 PROCEDURE — 76642 ULTRASOUND BREAST LIMITED: CPT

## 2021-04-22 ENCOUNTER — APPOINTMENT (OUTPATIENT)
Dept: ORTHOPEDIC SURGERY | Facility: CLINIC | Age: 46
End: 2021-04-22

## 2021-06-10 ENCOUNTER — NON-APPOINTMENT (OUTPATIENT)
Age: 46
End: 2021-06-10

## 2021-06-25 ENCOUNTER — APPOINTMENT (OUTPATIENT)
Dept: NEUROLOGY | Facility: CLINIC | Age: 46
End: 2021-06-25
Payer: MEDICARE

## 2021-06-25 VITALS
WEIGHT: 105 LBS | DIASTOLIC BLOOD PRESSURE: 67 MMHG | HEART RATE: 71 BPM | HEIGHT: 59 IN | BODY MASS INDEX: 21.17 KG/M2 | SYSTOLIC BLOOD PRESSURE: 98 MMHG

## 2021-06-25 PROCEDURE — 64615 CHEMODENERV MUSC MIGRAINE: CPT

## 2021-06-25 PROCEDURE — 99072 ADDL SUPL MATRL&STAF TM PHE: CPT

## 2021-06-25 PROCEDURE — 99212 OFFICE O/P EST SF 10 MIN: CPT | Mod: 25

## 2021-06-25 NOTE — HISTORY OF PRESENT ILLNESS
[FreeTextEntry1] : \par 45-year-old woman who is here for follow-up of her chronic migraine.  Since June patient has been doing well on the Aimovig however over the past month patient has recurrence of her migraine despite the Aimovig.  Patient has had no medication changes or infection that may account for her migraine exacerbation.\par \par interval history: she will be going to [facility] to visit her son for a month

## 2021-06-25 NOTE — DISCUSSION/SUMMARY
[FreeTextEntry1] : 45-year-old woman who is here for follow-up of her chronic migraine.  Patient will schedule another Botox injection in 3 months.  Patient requested refills on her naratriptan for her upcoming trip.\par \par I spent the time noted on the day of this patient encounter preparing for, providing and documenting the above E/M service and counseling and educate patient on differential, workup, disease course, and treatment/management. Education was provided to the patient during this encounter. All questions and concerns were answered and addressed in detail. The patient verbalized understanding and agreed to plan. Patient was advised to continue to monitor for neurologic symptoms and to notify my office or go to the nearest emergency room if there are any changes. Any orders/referrals and communications were provided as well. \par Side effects of the above medications were discussed in detail including but not limited to applicable black box warning and teratogenicity as appropriate. \par Patient was advised to bring previous records to my office. \par \par \par

## 2021-07-19 ENCOUNTER — RESULT REVIEW (OUTPATIENT)
Age: 46
End: 2021-07-19

## 2021-08-02 ENCOUNTER — APPOINTMENT (OUTPATIENT)
Dept: NEUROLOGY | Facility: CLINIC | Age: 46
End: 2021-08-02

## 2021-09-03 ENCOUNTER — NON-APPOINTMENT (OUTPATIENT)
Age: 46
End: 2021-09-03

## 2021-09-03 ENCOUNTER — APPOINTMENT (OUTPATIENT)
Dept: OPHTHALMOLOGY | Facility: CLINIC | Age: 46
End: 2021-09-03
Payer: MEDICARE

## 2021-09-03 PROCEDURE — 92004 COMPRE OPH EXAM NEW PT 1/>: CPT

## 2021-09-14 ENCOUNTER — APPOINTMENT (OUTPATIENT)
Dept: NEUROLOGY | Facility: CLINIC | Age: 46
End: 2021-09-14
Payer: MEDICARE

## 2021-09-14 VITALS
SYSTOLIC BLOOD PRESSURE: 91 MMHG | WEIGHT: 110 LBS | HEART RATE: 84 BPM | HEIGHT: 59 IN | BODY MASS INDEX: 22.18 KG/M2 | DIASTOLIC BLOOD PRESSURE: 69 MMHG

## 2021-09-14 PROCEDURE — 20552 NJX 1/MLT TRIGGER POINT 1/2: CPT

## 2021-09-14 PROCEDURE — 99214 OFFICE O/P EST MOD 30 MIN: CPT | Mod: 25

## 2021-09-14 NOTE — PROCEDURE
[FreeTextEntry1] : consent obtained.\par pt had 1cc of lidocaine injected in the 4 areas near the occipital nerves.\par 2cc bilateral temporalis muscles\par 2cc bilateral supraorbital regions.\par pt tolerated procedure well.

## 2021-09-14 NOTE — PHYSICAL EXAM
[General Appearance - Alert] : alert [Oriented To Time, Place, And Person] : oriented to person, place, and time [Person] : oriented to person [Place] : oriented to place [Time] : oriented to time [Short Term Intact] : short term memory intact [Fluency] : fluency intact [Current Events] : adequate knowledge of current events [Cranial Nerves Optic (II)] : visual acuity intact bilaterally,  visual fields full to confrontation, pupils equal round and reactive to light [Cranial Nerves Oculomotor (III)] : extraocular motion intact [Cranial Nerves Vestibulocochlear (VIII)] : hearing was intact bilaterally [Cranial Nerves Accessory (XI - Cranial And Spinal)] : head turning and shoulder shrug symmetric [Motor Tone] : muscle tone was normal in all four extremities [Motor Strength] : muscle strength was normal in all four extremities [Sensation Tactile Decrease] : light touch was intact [Abnormal Walk] : normal gait [Coordination - Dysmetria Impaired Finger-to-Nose Bilateral] : not present [1+] : Patella left 1+ [FreeTextEntry4] : Patient was not able to turn on her video

## 2021-09-14 NOTE — DISCUSSION/SUMMARY
[FreeTextEntry1] : 46-year-old woman who is here for last minute add-on as patient has right-sided headaches that are different from her previous migraine.  Will check MRI of the brain without contrast given the change in headache pattern and she was advised to call me within the next day or 2 if she continues to have the headache on the right side and we may pursue prednisone taper.\par \par I spent the time noted on the day of this patient encounter preparing for, providing and documenting the above E/M service and counseling and educate patient on differential, workup, disease course, and treatment/management. Education was provided to the patient during this encounter. All questions and concerns were answered and addressed in detail. The patient verbalized understanding and agreed to plan. Patient was advised to continue to monitor for neurologic symptoms and to notify my office or go to the nearest emergency room if there are any changes. Any orders/referrals and communications were provided as well. \par Side effects of the above medications were discussed in detail including but not limited to applicable black box warning and teratogenicity as appropriate. \par Patient was advised to bring previous records to my office. \par \par \par

## 2021-09-14 NOTE — HISTORY OF PRESENT ILLNESS
[FreeTextEntry1] : \par 45-year-old woman who is here for follow-up of her chronic migraine.  Since June patient has been doing well on the Aimovig however over the past month patient has recurrence of her migraine despite the Aimovig.  Patient has had no medication changes or infection that may account for her migraine exacerbation.\par \par interval history: she will be going to [facility] to visit her son for a month\par \par Interval history: Due to some complications she will is not able to go to [facility] to visit her son.  Patient was a last minute add-on as she had new onset of right-sided headaches that are pounding in nature.  This is different from her previous headaches.  She was agreeable to occipital nerve block.

## 2021-09-15 ENCOUNTER — OUTPATIENT (OUTPATIENT)
Dept: OUTPATIENT SERVICES | Facility: HOSPITAL | Age: 46
LOS: 1 days | End: 2021-09-15
Payer: MEDICARE

## 2021-09-15 ENCOUNTER — APPOINTMENT (OUTPATIENT)
Dept: ULTRASOUND IMAGING | Facility: IMAGING CENTER | Age: 46
End: 2021-09-15
Payer: MEDICARE

## 2021-09-15 DIAGNOSIS — S82.90XA UNSPECIFIED FRACTURE OF UNSPECIFIED LOWER LEG, INITIAL ENCOUNTER FOR CLOSED FRACTURE: Chronic | ICD-10-CM

## 2021-09-15 DIAGNOSIS — Z98.890 OTHER SPECIFIED POSTPROCEDURAL STATES: Chronic | ICD-10-CM

## 2021-09-15 DIAGNOSIS — D25.9 LEIOMYOMA OF UTERUS, UNSPECIFIED: Chronic | ICD-10-CM

## 2021-09-15 DIAGNOSIS — Z98.51 TUBAL LIGATION STATUS: Chronic | ICD-10-CM

## 2021-09-15 DIAGNOSIS — Z00.8 ENCOUNTER FOR OTHER GENERAL EXAMINATION: ICD-10-CM

## 2021-09-15 DIAGNOSIS — N84.0 POLYP OF CORPUS UTERI: Chronic | ICD-10-CM

## 2021-09-15 DIAGNOSIS — Z98.1 ARTHRODESIS STATUS: Chronic | ICD-10-CM

## 2021-09-15 PROCEDURE — 76700 US EXAM ABDOM COMPLETE: CPT

## 2021-09-15 PROCEDURE — 76700 US EXAM ABDOM COMPLETE: CPT | Mod: 26

## 2021-09-21 ENCOUNTER — APPOINTMENT (OUTPATIENT)
Dept: NEUROLOGY | Facility: CLINIC | Age: 46
End: 2021-09-21
Payer: MEDICARE

## 2021-09-21 VITALS
HEIGHT: 59 IN | SYSTOLIC BLOOD PRESSURE: 106 MMHG | BODY MASS INDEX: 22.18 KG/M2 | HEART RATE: 71 BPM | DIASTOLIC BLOOD PRESSURE: 65 MMHG | WEIGHT: 110 LBS

## 2021-09-21 PROCEDURE — 64615 CHEMODENERV MUSC MIGRAINE: CPT

## 2021-09-21 NOTE — HISTORY OF PRESENT ILLNESS
[FreeTextEntry1] : \par 46-year-old woman who is here for follow-up of her chronic migraine.  Since June patient has been doing well on the Aimovig however over the past month patient has recurrence of her migraine despite the Aimovig.  Patient has had no medication changes or infection that may account for her migraine exacerbation.\par \par interval history: she will be going to [facility] to visit her son for a month\par \par Interval history: Due to some complications she will is not able to go to [facility] to visit her son.  Patient was a last minute add-on as she had new onset of right-sided headaches that are pounding in nature.  This is different from her previous headaches.  She was agreeable to occipital nerve block.\par \par interval history: she is here for botox injections

## 2021-09-23 ENCOUNTER — APPOINTMENT (OUTPATIENT)
Dept: GASTROENTEROLOGY | Facility: CLINIC | Age: 46
End: 2021-09-23

## 2021-10-01 ENCOUNTER — APPOINTMENT (OUTPATIENT)
Dept: OPHTHALMOLOGY | Facility: CLINIC | Age: 46
End: 2021-10-01

## 2021-11-15 ENCOUNTER — OUTPATIENT (OUTPATIENT)
Dept: OUTPATIENT SERVICES | Facility: HOSPITAL | Age: 46
LOS: 1 days | End: 2021-11-15
Payer: MEDICARE

## 2021-11-15 ENCOUNTER — APPOINTMENT (OUTPATIENT)
Dept: MAMMOGRAPHY | Facility: CLINIC | Age: 46
End: 2021-11-15
Payer: MEDICARE

## 2021-11-15 ENCOUNTER — APPOINTMENT (OUTPATIENT)
Dept: ULTRASOUND IMAGING | Facility: CLINIC | Age: 46
End: 2021-11-15
Payer: MEDICARE

## 2021-11-15 DIAGNOSIS — Z98.51 TUBAL LIGATION STATUS: Chronic | ICD-10-CM

## 2021-11-15 DIAGNOSIS — D25.9 LEIOMYOMA OF UTERUS, UNSPECIFIED: Chronic | ICD-10-CM

## 2021-11-15 DIAGNOSIS — Z98.890 OTHER SPECIFIED POSTPROCEDURAL STATES: Chronic | ICD-10-CM

## 2021-11-15 DIAGNOSIS — S82.90XA UNSPECIFIED FRACTURE OF UNSPECIFIED LOWER LEG, INITIAL ENCOUNTER FOR CLOSED FRACTURE: Chronic | ICD-10-CM

## 2021-11-15 DIAGNOSIS — Z00.8 ENCOUNTER FOR OTHER GENERAL EXAMINATION: ICD-10-CM

## 2021-11-15 DIAGNOSIS — N84.0 POLYP OF CORPUS UTERI: Chronic | ICD-10-CM

## 2021-11-15 DIAGNOSIS — Z98.1 ARTHRODESIS STATUS: Chronic | ICD-10-CM

## 2021-11-15 PROCEDURE — 77063 BREAST TOMOSYNTHESIS BI: CPT | Mod: 26

## 2021-11-15 PROCEDURE — 76641 ULTRASOUND BREAST COMPLETE: CPT | Mod: 26,50

## 2021-11-15 PROCEDURE — 77067 SCR MAMMO BI INCL CAD: CPT | Mod: 26

## 2021-11-15 PROCEDURE — 77063 BREAST TOMOSYNTHESIS BI: CPT

## 2021-11-15 PROCEDURE — 76641 ULTRASOUND BREAST COMPLETE: CPT

## 2021-11-15 PROCEDURE — 77067 SCR MAMMO BI INCL CAD: CPT

## 2021-11-22 ENCOUNTER — APPOINTMENT (OUTPATIENT)
Dept: ULTRASOUND IMAGING | Facility: IMAGING CENTER | Age: 46
End: 2021-11-22
Payer: MEDICARE

## 2021-11-22 ENCOUNTER — OUTPATIENT (OUTPATIENT)
Dept: OUTPATIENT SERVICES | Facility: HOSPITAL | Age: 46
LOS: 1 days | End: 2021-11-22
Payer: MEDICARE

## 2021-11-22 ENCOUNTER — APPOINTMENT (OUTPATIENT)
Dept: MAMMOGRAPHY | Facility: IMAGING CENTER | Age: 46
End: 2021-11-22
Payer: MEDICARE

## 2021-11-22 DIAGNOSIS — S82.90XA UNSPECIFIED FRACTURE OF UNSPECIFIED LOWER LEG, INITIAL ENCOUNTER FOR CLOSED FRACTURE: Chronic | ICD-10-CM

## 2021-11-22 DIAGNOSIS — Z98.51 TUBAL LIGATION STATUS: Chronic | ICD-10-CM

## 2021-11-22 DIAGNOSIS — Z00.8 ENCOUNTER FOR OTHER GENERAL EXAMINATION: ICD-10-CM

## 2021-11-22 DIAGNOSIS — Z98.890 OTHER SPECIFIED POSTPROCEDURAL STATES: Chronic | ICD-10-CM

## 2021-11-22 DIAGNOSIS — Z98.1 ARTHRODESIS STATUS: Chronic | ICD-10-CM

## 2021-11-22 DIAGNOSIS — D25.9 LEIOMYOMA OF UTERUS, UNSPECIFIED: Chronic | ICD-10-CM

## 2021-11-22 DIAGNOSIS — N84.0 POLYP OF CORPUS UTERI: Chronic | ICD-10-CM

## 2021-11-22 PROCEDURE — 76642 ULTRASOUND BREAST LIMITED: CPT | Mod: 26,LT

## 2021-11-22 PROCEDURE — G0279: CPT | Mod: 26

## 2021-11-22 PROCEDURE — G0279: CPT

## 2021-11-22 PROCEDURE — 77065 DX MAMMO INCL CAD UNI: CPT

## 2021-11-22 PROCEDURE — 77065 DX MAMMO INCL CAD UNI: CPT | Mod: 26,LT

## 2021-11-22 PROCEDURE — 76642 ULTRASOUND BREAST LIMITED: CPT

## 2021-12-03 ENCOUNTER — APPOINTMENT (OUTPATIENT)
Dept: ORTHOPEDIC SURGERY | Facility: CLINIC | Age: 46
End: 2021-12-03
Payer: MEDICARE

## 2021-12-03 VITALS
BODY MASS INDEX: 21.57 KG/M2 | HEIGHT: 59 IN | WEIGHT: 107 LBS | SYSTOLIC BLOOD PRESSURE: 113 MMHG | HEART RATE: 74 BPM | DIASTOLIC BLOOD PRESSURE: 76 MMHG

## 2021-12-03 DIAGNOSIS — M54.16 RADICULOPATHY, LUMBAR REGION: ICD-10-CM

## 2021-12-03 PROCEDURE — 99204 OFFICE O/P NEW MOD 45 MIN: CPT

## 2021-12-03 PROCEDURE — 72100 X-RAY EXAM L-S SPINE 2/3 VWS: CPT

## 2021-12-05 NOTE — REASON FOR VISIT
[Initial Visit] : an initial visit for [FreeTextEntry2] : low back pain radiating down to left leg.

## 2021-12-06 ENCOUNTER — RESULT REVIEW (OUTPATIENT)
Age: 46
End: 2021-12-06

## 2021-12-06 ENCOUNTER — APPOINTMENT (OUTPATIENT)
Dept: MAMMOGRAPHY | Facility: IMAGING CENTER | Age: 46
End: 2021-12-06
Payer: MEDICARE

## 2021-12-06 ENCOUNTER — OUTPATIENT (OUTPATIENT)
Dept: OUTPATIENT SERVICES | Facility: HOSPITAL | Age: 46
LOS: 1 days | End: 2021-12-06
Payer: MEDICARE

## 2021-12-06 DIAGNOSIS — Z98.890 OTHER SPECIFIED POSTPROCEDURAL STATES: Chronic | ICD-10-CM

## 2021-12-06 DIAGNOSIS — S82.90XA UNSPECIFIED FRACTURE OF UNSPECIFIED LOWER LEG, INITIAL ENCOUNTER FOR CLOSED FRACTURE: Chronic | ICD-10-CM

## 2021-12-06 DIAGNOSIS — N84.0 POLYP OF CORPUS UTERI: Chronic | ICD-10-CM

## 2021-12-06 DIAGNOSIS — Z98.1 ARTHRODESIS STATUS: Chronic | ICD-10-CM

## 2021-12-06 DIAGNOSIS — Z98.51 TUBAL LIGATION STATUS: Chronic | ICD-10-CM

## 2021-12-06 DIAGNOSIS — D25.9 LEIOMYOMA OF UTERUS, UNSPECIFIED: Chronic | ICD-10-CM

## 2021-12-06 DIAGNOSIS — Z00.8 ENCOUNTER FOR OTHER GENERAL EXAMINATION: ICD-10-CM

## 2021-12-06 DIAGNOSIS — N60.02 SOLITARY CYST OF LEFT BREAST: ICD-10-CM

## 2021-12-06 DIAGNOSIS — Z80.3 FAMILY HISTORY OF MALIGNANT NEOPLASM OF BREAST: ICD-10-CM

## 2021-12-06 PROCEDURE — A4648: CPT

## 2021-12-06 PROCEDURE — 88305 TISSUE EXAM BY PATHOLOGIST: CPT

## 2021-12-06 PROCEDURE — 77065 DX MAMMO INCL CAD UNI: CPT

## 2021-12-06 PROCEDURE — 19081 BX BREAST 1ST LESION STRTCTC: CPT

## 2021-12-06 PROCEDURE — 77065 DX MAMMO INCL CAD UNI: CPT | Mod: 26,LT

## 2021-12-06 PROCEDURE — 19081 BX BREAST 1ST LESION STRTCTC: CPT | Mod: LT

## 2021-12-06 PROCEDURE — 88305 TISSUE EXAM BY PATHOLOGIST: CPT | Mod: 26

## 2021-12-08 LAB — SURGICAL PATHOLOGY STUDY: SIGNIFICANT CHANGE UP

## 2021-12-21 ENCOUNTER — APPOINTMENT (OUTPATIENT)
Dept: NEUROLOGY | Facility: CLINIC | Age: 46
End: 2021-12-21
Payer: MEDICARE

## 2021-12-21 PROCEDURE — 64615 CHEMODENERV MUSC MIGRAINE: CPT

## 2021-12-21 PROCEDURE — 99212 OFFICE O/P EST SF 10 MIN: CPT | Mod: 25

## 2021-12-21 NOTE — HISTORY OF PRESENT ILLNESS
[FreeTextEntry1] : \par 46-year-old woman who is here for follow-up of her chronic migraine.  Since June patient has been doing well on the Aimovig however over the past month patient has recurrence of her migraine despite the Aimovig.  Patient has had no medication changes or infection that may account for her migraine exacerbation.\par \par interval history: she will be going to [facility] to visit her son for a month\par \par Interval history: Due to some complications she will is not able to go to [facility] to visit her son.  Patient was a last minute add-on as she had new onset of right-sided headaches that are pounding in nature.  This is different from her previous headaches.  She was agreeable to occipital nerve block.\par \par interval history: she is here for botox injections\par \par interval history: she has some migraines about 2 since last month.

## 2022-01-03 ENCOUNTER — NON-APPOINTMENT (OUTPATIENT)
Age: 47
End: 2022-01-03

## 2022-01-03 ENCOUNTER — APPOINTMENT (OUTPATIENT)
Dept: SURGICAL ONCOLOGY | Facility: CLINIC | Age: 47
End: 2022-01-03
Payer: MEDICARE

## 2022-01-03 VITALS
DIASTOLIC BLOOD PRESSURE: 83 MMHG | SYSTOLIC BLOOD PRESSURE: 123 MMHG | HEART RATE: 87 BPM | BODY MASS INDEX: 21.57 KG/M2 | HEIGHT: 59 IN | RESPIRATION RATE: 16 BRPM | TEMPERATURE: 97.3 F | OXYGEN SATURATION: 98 % | WEIGHT: 107 LBS

## 2022-01-03 PROCEDURE — 99205 OFFICE O/P NEW HI 60 MIN: CPT

## 2022-01-20 ENCOUNTER — OUTPATIENT (OUTPATIENT)
Dept: OUTPATIENT SERVICES | Facility: HOSPITAL | Age: 47
LOS: 1 days | End: 2022-01-20
Payer: MEDICARE

## 2022-01-20 ENCOUNTER — APPOINTMENT (OUTPATIENT)
Dept: MRI IMAGING | Facility: IMAGING CENTER | Age: 47
End: 2022-01-20
Payer: COMMERCIAL

## 2022-01-20 DIAGNOSIS — N84.0 POLYP OF CORPUS UTERI: Chronic | ICD-10-CM

## 2022-01-20 DIAGNOSIS — N60.92 UNSPECIFIED BENIGN MAMMARY DYSPLASIA OF LEFT BREAST: ICD-10-CM

## 2022-01-20 DIAGNOSIS — Z98.1 ARTHRODESIS STATUS: Chronic | ICD-10-CM

## 2022-01-20 DIAGNOSIS — Z98.890 OTHER SPECIFIED POSTPROCEDURAL STATES: Chronic | ICD-10-CM

## 2022-01-20 DIAGNOSIS — D25.9 LEIOMYOMA OF UTERUS, UNSPECIFIED: Chronic | ICD-10-CM

## 2022-01-20 DIAGNOSIS — S82.90XA UNSPECIFIED FRACTURE OF UNSPECIFIED LOWER LEG, INITIAL ENCOUNTER FOR CLOSED FRACTURE: Chronic | ICD-10-CM

## 2022-01-20 DIAGNOSIS — Z98.51 TUBAL LIGATION STATUS: Chronic | ICD-10-CM

## 2022-01-20 DIAGNOSIS — Z00.8 ENCOUNTER FOR OTHER GENERAL EXAMINATION: ICD-10-CM

## 2022-01-20 PROCEDURE — A9585: CPT

## 2022-01-20 PROCEDURE — C8908: CPT

## 2022-01-20 PROCEDURE — C8937: CPT

## 2022-01-20 PROCEDURE — 77049 MRI BREAST C-+ W/CAD BI: CPT | Mod: 26

## 2022-02-16 ENCOUNTER — RESULT REVIEW (OUTPATIENT)
Age: 47
End: 2022-02-16

## 2022-02-16 ENCOUNTER — OUTPATIENT (OUTPATIENT)
Dept: OUTPATIENT SERVICES | Facility: HOSPITAL | Age: 47
LOS: 1 days | End: 2022-02-16
Payer: MEDICARE

## 2022-02-16 VITALS
SYSTOLIC BLOOD PRESSURE: 106 MMHG | WEIGHT: 108.03 LBS | RESPIRATION RATE: 16 BRPM | HEIGHT: 57 IN | HEART RATE: 80 BPM | TEMPERATURE: 98 F | DIASTOLIC BLOOD PRESSURE: 72 MMHG | OXYGEN SATURATION: 99 %

## 2022-02-16 DIAGNOSIS — Z98.890 OTHER SPECIFIED POSTPROCEDURAL STATES: Chronic | ICD-10-CM

## 2022-02-16 DIAGNOSIS — N84.0 POLYP OF CORPUS UTERI: Chronic | ICD-10-CM

## 2022-02-16 DIAGNOSIS — F41.9 ANXIETY DISORDER, UNSPECIFIED: ICD-10-CM

## 2022-02-16 DIAGNOSIS — D25.9 LEIOMYOMA OF UTERUS, UNSPECIFIED: Chronic | ICD-10-CM

## 2022-02-16 DIAGNOSIS — S82.90XA UNSPECIFIED FRACTURE OF UNSPECIFIED LOWER LEG, INITIAL ENCOUNTER FOR CLOSED FRACTURE: Chronic | ICD-10-CM

## 2022-02-16 DIAGNOSIS — N60.92 UNSPECIFIED BENIGN MAMMARY DYSPLASIA OF LEFT BREAST: ICD-10-CM

## 2022-02-16 DIAGNOSIS — Z98.1 ARTHRODESIS STATUS: Chronic | ICD-10-CM

## 2022-02-16 DIAGNOSIS — Z98.51 TUBAL LIGATION STATUS: Chronic | ICD-10-CM

## 2022-02-16 LAB
ALBUMIN SERPL ELPH-MCNC: 4.3 G/DL — SIGNIFICANT CHANGE UP (ref 3.3–5)
ALP SERPL-CCNC: 98 U/L — SIGNIFICANT CHANGE UP (ref 40–120)
ALT FLD-CCNC: 41 U/L — SIGNIFICANT CHANGE UP (ref 10–45)
ANION GAP SERPL CALC-SCNC: 10 MMOL/L — SIGNIFICANT CHANGE UP (ref 5–17)
AST SERPL-CCNC: 17 U/L — SIGNIFICANT CHANGE UP (ref 10–40)
BILIRUB SERPL-MCNC: 0.4 MG/DL — SIGNIFICANT CHANGE UP (ref 0.2–1.2)
BUN SERPL-MCNC: 14 MG/DL — SIGNIFICANT CHANGE UP (ref 7–23)
CALCIUM SERPL-MCNC: 9.5 MG/DL — SIGNIFICANT CHANGE UP (ref 8.4–10.5)
CHLORIDE SERPL-SCNC: 105 MMOL/L — SIGNIFICANT CHANGE UP (ref 96–108)
CO2 SERPL-SCNC: 27 MMOL/L — SIGNIFICANT CHANGE UP (ref 22–31)
CREAT SERPL-MCNC: 0.56 MG/DL — SIGNIFICANT CHANGE UP (ref 0.5–1.3)
GLUCOSE SERPL-MCNC: 111 MG/DL — HIGH (ref 70–99)
HCT VFR BLD CALC: 39.9 % — SIGNIFICANT CHANGE UP (ref 34.5–45)
HGB BLD-MCNC: 13.2 G/DL — SIGNIFICANT CHANGE UP (ref 11.5–15.5)
MCHC RBC-ENTMCNC: 30.3 PG — SIGNIFICANT CHANGE UP (ref 27–34)
MCHC RBC-ENTMCNC: 33.1 GM/DL — SIGNIFICANT CHANGE UP (ref 32–36)
MCV RBC AUTO: 91.5 FL — SIGNIFICANT CHANGE UP (ref 80–100)
NRBC # BLD: 0 /100 WBCS — SIGNIFICANT CHANGE UP (ref 0–0)
PLATELET # BLD AUTO: 198 K/UL — SIGNIFICANT CHANGE UP (ref 150–400)
POTASSIUM SERPL-MCNC: 4.4 MMOL/L — SIGNIFICANT CHANGE UP (ref 3.5–5.3)
POTASSIUM SERPL-SCNC: 4.4 MMOL/L — SIGNIFICANT CHANGE UP (ref 3.5–5.3)
PROT SERPL-MCNC: 7.7 G/DL — SIGNIFICANT CHANGE UP (ref 6–8.3)
RBC # BLD: 4.36 M/UL — SIGNIFICANT CHANGE UP (ref 3.8–5.2)
RBC # FLD: 12.9 % — SIGNIFICANT CHANGE UP (ref 10.3–14.5)
SODIUM SERPL-SCNC: 142 MMOL/L — SIGNIFICANT CHANGE UP (ref 135–145)
WBC # BLD: 6.04 K/UL — SIGNIFICANT CHANGE UP (ref 3.8–10.5)
WBC # FLD AUTO: 6.04 K/UL — SIGNIFICANT CHANGE UP (ref 3.8–10.5)

## 2022-02-16 PROCEDURE — G0463: CPT

## 2022-02-16 PROCEDURE — 80053 COMPREHEN METABOLIC PANEL: CPT

## 2022-02-16 PROCEDURE — 71046 X-RAY EXAM CHEST 2 VIEWS: CPT

## 2022-02-16 PROCEDURE — 85027 COMPLETE CBC AUTOMATED: CPT

## 2022-02-16 PROCEDURE — 71046 X-RAY EXAM CHEST 2 VIEWS: CPT | Mod: 26

## 2022-02-16 PROCEDURE — 36415 COLL VENOUS BLD VENIPUNCTURE: CPT

## 2022-02-16 NOTE — H&P PST ADULT - WEIGHT IN LBS
I think it was only a LEFT diagnostic mammo in December so she at least needs right screening but I believe it is ordered as bilateral  
Patient aware of order and has been transferred to [facility] to schedule.  
Patient is asking if she needs a screening bilateral mammogram done this year if she is having a diagnostic mammo ?     If so please place order and [facility] to see if diagnostic can be rescheduled to be done at same time.   
The patient called and stated that since she is coming in for a left diagnostic mammo on 7.9, if Dr Byers wants her to have her regular mammogram, can she do them on the same day.  
108

## 2022-02-16 NOTE — H&P PST ADULT - BREASTS COMMENTS
unspecified benign mammary dysplasia of left breast unspecified benign mammary dysplasia of left breast, no breast mass palpated on exam by NP, no nipple discharge, b/l axilla WNL

## 2022-02-16 NOTE — H&P PST ADULT - NSICDXPASTMEDICALHX_GEN_ALL_CORE_FT
PAST MEDICAL HISTORY:  Anxiety     Endometriosis     Fibrocystic breast     Fibromyalgia     Herniated lumbar intervertebral disc     Migraines     Ovarian cyst     Psoriasis     Tibial fracture     Unspecified benign mammary dysplasia of left breast     Uterine fibroid     Uterine polyp

## 2022-02-16 NOTE — H&P PST ADULT - ATTENDING COMMENTS
46-year-old lady with a left breast intraductal papilloma and atypia between 12:00 and 1:00, scheduled for excision with preoperative localization.    Discussed with her prior to surgery again today.    All questions answered, consent on chart 46-year-old lady with a left breast intraductal papilloma and atypia between 12:00 and 1:00, scheduled for excision with preoperative localization.    Discussed with her prior to surgery again today.    All questions answered, consent on chart    No discrete visible or palpable abnormality in either breast.  No regional adenopathy

## 2022-02-16 NOTE — H&P PST ADULT - MAMMOGRAM, RESULTS OF LAST, PROFILE
NP notified. will order neuro checks q6 hrs. Neurology following. Will continue to monitor. abnormal, unspecified benign mammary dysplasia of left breast, see HPI

## 2022-02-16 NOTE — H&P PST ADULT - NSANTHOSAYNRD_GEN_A_CORE
neck 13 inches/No. ANGEL screening performed.  STOP BANG Legend: 0-2 = LOW Risk; 3-4 = INTERMEDIATE Risk; 5-8 = HIGH Risk

## 2022-02-16 NOTE — H&P PST ADULT - PROBLEM SELECTOR PLAN 1
pt scheduled for excision left breast atypia w/wire mammo localization, denies breast tenderness, breast changes, nipple discharge on 02/24/22  Preop instructions provided. Pt verbalized understanding.    written and verbal instructions with teach back on chlorhexidine shampoo provided  pt confirmed has appt for COVID test scheduled on 01/21/22 at [facility] eval scheduled on 02/18/22, copy requested

## 2022-02-16 NOTE — H&P PST ADULT - VISION (WITH CORRECTIVE LENSES IF THE PATIENT USUALLY WEARS THEM):
uses contact lenses, instructed to wear glasses on day of OR/Partially impaired: cannot see medication labels or newsprint, but can see obstacles in path, and the surrounding layout; can count fingers at arm's length

## 2022-02-16 NOTE — H&P PST ADULT - HISTORY OF PRESENT ILLNESS
46 year old female reports h/o abnormal mammogram in 11/2021, followed by breast ultrasound, biopsy and MRI, presents to PST with preop diagnosis of unspecified benign mammary dysplasia of left breast, scheduled for excision left breast atypia w/wire mammo localization, denies breast tenderness, breast changes, nipple discharge

## 2022-02-23 ENCOUNTER — OUTPATIENT (OUTPATIENT)
Dept: OUTPATIENT SERVICES | Facility: HOSPITAL | Age: 47
LOS: 1 days | End: 2022-02-23

## 2022-02-23 ENCOUNTER — TRANSCRIPTION ENCOUNTER (OUTPATIENT)
Age: 47
End: 2022-02-23

## 2022-02-23 DIAGNOSIS — S82.90XA UNSPECIFIED FRACTURE OF UNSPECIFIED LOWER LEG, INITIAL ENCOUNTER FOR CLOSED FRACTURE: Chronic | ICD-10-CM

## 2022-02-23 DIAGNOSIS — N84.0 POLYP OF CORPUS UTERI: Chronic | ICD-10-CM

## 2022-02-23 DIAGNOSIS — D25.9 LEIOMYOMA OF UTERUS, UNSPECIFIED: Chronic | ICD-10-CM

## 2022-02-23 DIAGNOSIS — Z98.890 OTHER SPECIFIED POSTPROCEDURAL STATES: Chronic | ICD-10-CM

## 2022-02-23 DIAGNOSIS — Z98.51 TUBAL LIGATION STATUS: Chronic | ICD-10-CM

## 2022-02-23 DIAGNOSIS — Z98.1 ARTHRODESIS STATUS: Chronic | ICD-10-CM

## 2022-02-23 DIAGNOSIS — C50.919 MALIGNANT NEOPLASM OF UNSPECIFIED SITE OF UNSPECIFIED FEMALE BREAST: ICD-10-CM

## 2022-02-23 PROBLEM — F41.9 ANXIETY DISORDER, UNSPECIFIED: Chronic | Status: ACTIVE | Noted: 2022-02-16

## 2022-02-23 PROBLEM — N60.92 UNSPECIFIED BENIGN MAMMARY DYSPLASIA OF LEFT BREAST: Chronic | Status: ACTIVE | Noted: 2022-02-16

## 2022-02-24 ENCOUNTER — OUTPATIENT (OUTPATIENT)
Dept: OUTPATIENT SERVICES | Facility: HOSPITAL | Age: 47
LOS: 1 days | Discharge: HOME | End: 2022-02-24
Payer: MEDICARE

## 2022-02-24 ENCOUNTER — RESULT REVIEW (OUTPATIENT)
Age: 47
End: 2022-02-24

## 2022-02-24 ENCOUNTER — APPOINTMENT (OUTPATIENT)
Dept: SURGICAL ONCOLOGY | Facility: HOSPITAL | Age: 47
End: 2022-02-24

## 2022-02-24 VITALS
DIASTOLIC BLOOD PRESSURE: 70 MMHG | SYSTOLIC BLOOD PRESSURE: 105 MMHG | OXYGEN SATURATION: 98 % | TEMPERATURE: 97 F | RESPIRATION RATE: 16 BRPM | HEART RATE: 83 BPM

## 2022-02-24 VITALS
RESPIRATION RATE: 14 BRPM | DIASTOLIC BLOOD PRESSURE: 73 MMHG | OXYGEN SATURATION: 100 % | HEIGHT: 57 IN | WEIGHT: 108.03 LBS | HEART RATE: 77 BPM | SYSTOLIC BLOOD PRESSURE: 117 MMHG | TEMPERATURE: 98 F

## 2022-02-24 DIAGNOSIS — S82.90XA UNSPECIFIED FRACTURE OF UNSPECIFIED LOWER LEG, INITIAL ENCOUNTER FOR CLOSED FRACTURE: Chronic | ICD-10-CM

## 2022-02-24 DIAGNOSIS — Z98.890 OTHER SPECIFIED POSTPROCEDURAL STATES: Chronic | ICD-10-CM

## 2022-02-24 DIAGNOSIS — D25.9 LEIOMYOMA OF UTERUS, UNSPECIFIED: Chronic | ICD-10-CM

## 2022-02-24 DIAGNOSIS — Z98.1 ARTHRODESIS STATUS: Chronic | ICD-10-CM

## 2022-02-24 DIAGNOSIS — N60.92 UNSPECIFIED BENIGN MAMMARY DYSPLASIA OF LEFT BREAST: ICD-10-CM

## 2022-02-24 DIAGNOSIS — N84.0 POLYP OF CORPUS UTERI: Chronic | ICD-10-CM

## 2022-02-24 DIAGNOSIS — Z98.51 TUBAL LIGATION STATUS: Chronic | ICD-10-CM

## 2022-02-24 PROCEDURE — 76098 X-RAY EXAM SURGICAL SPECIMEN: CPT

## 2022-02-24 PROCEDURE — 19301 PARTIAL MASTECTOMY: CPT | Mod: LT

## 2022-02-24 PROCEDURE — 76098 X-RAY EXAM SURGICAL SPECIMEN: CPT | Mod: 26

## 2022-02-24 PROCEDURE — C1889: CPT

## 2022-02-24 PROCEDURE — 88307 TISSUE EXAM BY PATHOLOGIST: CPT | Mod: 26

## 2022-02-24 PROCEDURE — 19281 PERQ DEVICE BREAST 1ST IMAG: CPT

## 2022-02-24 PROCEDURE — 19281 PERQ DEVICE BREAST 1ST IMAG: CPT | Mod: LT

## 2022-02-24 PROCEDURE — 19125 EXCISION BREAST LESION: CPT | Mod: LT

## 2022-02-24 PROCEDURE — 14301 TIS TRNFR ANY 30.1-60 SQ CM: CPT

## 2022-02-24 PROCEDURE — 88307 TISSUE EXAM BY PATHOLOGIST: CPT

## 2022-02-24 PROCEDURE — 13101 CMPLX RPR TRUNK 2.6-7.5 CM: CPT | Mod: LT

## 2022-02-24 DEVICE — HEMOSTAT ARISTA 3GR: Type: IMPLANTABLE DEVICE | Site: LEFT | Status: FUNCTIONAL

## 2022-02-24 RX ORDER — DOCUSATE SODIUM 100 MG
1 CAPSULE ORAL
Qty: 14 | Refills: 0
Start: 2022-02-24 | End: 2022-03-02

## 2022-02-24 RX ORDER — ONABOTULINUMTOXINA 100 UNIT
0 VIAL (EA) INJECTION
Qty: 0 | Refills: 0 | DISCHARGE

## 2022-02-24 RX ORDER — SODIUM CHLORIDE 9 MG/ML
1000 INJECTION, SOLUTION INTRAVENOUS
Refills: 0 | Status: DISCONTINUED | OUTPATIENT
Start: 2022-02-24 | End: 2022-02-24

## 2022-02-24 RX ORDER — ONDANSETRON 8 MG/1
4 TABLET, FILM COATED ORAL ONCE
Refills: 0 | Status: DISCONTINUED | OUTPATIENT
Start: 2022-02-24 | End: 2022-02-24

## 2022-02-24 RX ORDER — HEPARIN SODIUM 5000 [USP'U]/ML
5000 INJECTION INTRAVENOUS; SUBCUTANEOUS ONCE
Refills: 0 | Status: COMPLETED | OUTPATIENT
Start: 2022-02-24 | End: 2022-02-24

## 2022-02-24 RX ORDER — AZTREONAM 2 G
1 VIAL (EA) INJECTION
Qty: 4 | Refills: 0
Start: 2022-02-24 | End: 2022-02-25

## 2022-02-24 RX ORDER — ACETAMINOPHEN 500 MG
2 TABLET ORAL
Qty: 30 | Refills: 0
Start: 2022-02-24 | End: 2022-02-28

## 2022-02-24 RX ORDER — HYDROMORPHONE HYDROCHLORIDE 2 MG/ML
0.5 INJECTION INTRAMUSCULAR; INTRAVENOUS; SUBCUTANEOUS
Refills: 0 | Status: DISCONTINUED | OUTPATIENT
Start: 2022-02-24 | End: 2022-02-24

## 2022-02-24 RX ORDER — TRAMADOL HYDROCHLORIDE 50 MG/1
1 TABLET ORAL
Qty: 12 | Refills: 0
Start: 2022-02-24 | End: 2022-02-26

## 2022-02-24 RX ADMIN — HEPARIN SODIUM 5000 UNIT(S): 5000 INJECTION INTRAVENOUS; SUBCUTANEOUS at 10:24

## 2022-02-24 RX ADMIN — SODIUM CHLORIDE 50 MILLILITER(S): 9 INJECTION, SOLUTION INTRAVENOUS at 08:57

## 2022-02-24 NOTE — ASU DISCHARGE PLAN (ADULT/PEDIATRIC) - PROCEDURE
Left breast biopsy with preoperative localization Left breast biopsy with preoperative localization with left breast oncoplastic closure Left breast biopsy with preoperative localization with oncoplastic closure of the left breast

## 2022-02-24 NOTE — ASU DISCHARGE PLAN (ADULT/PEDIATRIC) - ASU DC SPECIAL INSTRUCTIONSFT
Maintain mammary support for 48 hours after getting home.    At that time, may remove garment, surgical tape, and gauze.    Do not remove Steri-Strips.    May shower after above.    After showering, do not need to reapply a dressing.    Should wear mammary support, or own bra, even at night for the next 5 days.    Dr. Ogden should call with pathology report in approximately 2 weeks, that conversation will determine further management

## 2022-02-24 NOTE — ASU PATIENT PROFILE, ADULT - VISION (WITH CORRECTIVE LENSES IF THE PATIENT USUALLY WEARS THEM):
uses contact lenses, instructed to wear glasses on day of OR/Partially impaired: cannot see medication labels or newsprint, but can see obstacles in path, and the surrounding layout; can count fingers at arm's length uses contact lenses, instructed to wear glasses on day of OR/Normal vision: sees adequately in most situations; can see medication labels, newsprint

## 2022-02-24 NOTE — ASU DISCHARGE PLAN (ADULT/PEDIATRIC) - NS MD DC FALL RISK RISK
For information on Fall & Injury Prevention, visit: [email or web address] OR  [email or web address] OR  https://www.cdc.gov/steadi/patient.html

## 2022-02-24 NOTE — ASU PATIENT PROFILE, ADULT - FALL HARM RISK - UNIVERSAL INTERVENTIONS
Bed in lowest position, wheels locked, appropriate side rails in place/Call bell, personal items and telephone in reach/Instruct patient to call for assistance before getting out of bed or chair/Non-slip footwear when patient is out of bed/[place] to call system/Physically safe environment - no spills, clutter or unnecessary equipment/Purposeful Proactive Rounding/Room/bathroom lighting operational, light cord in reach

## 2022-02-24 NOTE — ASU DISCHARGE PLAN (ADULT/PEDIATRIC) - CARE PROVIDER_API CALL
Aleida Gupta (MD)  Surgery  [address] [place] [id number]  Phone: [phone]  Fax: [phone]  Follow Up Time: 1 week

## 2022-02-24 NOTE — ASU DISCHARGE PLAN (ADULT/PEDIATRIC) - NURSING INSTRUCTIONS
All discharge information reviewed with patient including pain, safety, medication and follow up care . Pt acknowledges understanding of discharge instructions.    Call Dr. Gupta to follow up in one week

## 2022-02-24 NOTE — ASU PATIENT PROFILE, ADULT - DOES PATIENT HAVE ADVANCE DIRECTIVE
Writer left message for patient to reschedule 2/14/20 appointment. Please review labs and call patient if she needs a sooner appointment.    No Yes

## 2022-03-01 LAB — SURGICAL PATHOLOGY STUDY: SIGNIFICANT CHANGE UP

## 2022-03-03 NOTE — REASON FOR VISIT
[Initial Consultation] : an initial consultation for [Other: _____] : [unfilled] [FreeTextEntry2] : Recently diagnosed left breast atypia with associated papilloma

## 2022-03-03 NOTE — ASSESSMENT
[FreeTextEntry1] : Today she is asymptomatic with a normal breast examination.\par \par We discussed her recently diagnosed left breast atypia, with an associated papilloma.\par I explained that while neither is cancer, nor premalignant, the area should be excised as a precaution due to the possibility of more significant findings in the surrounding tissues.\par I presented the technique and rationale for excision with preoperative localization.\par \par She understands the benefit of a preoperative breast MRI (recently diagnosed atypia, and breast cancer risk score =31.4%).\par Prescription entered today.\par \par I also entered the paperwork for surgical scheduling.\par \par Even if there are no more significant histologic findings on surgical pathology, she may benefit from postoperative systemic risk reduction therapy.............. \par \par Reviewed in detail, all questions answered.\par \par Note dictated to the referring physician.\par \par \par 1/24/2022.\par We spoke.\par 1/20/2022:\par Bilateral breast MRI at 450: BI-RADS 2.\par The area of enhancement extends 1.4 cm anterior to the clip, and 1.0 cm medial to the clip\par \par \par 2/16/22:\par I returned her call, but had to leave a VM\par \par \par 3/3/2022.\par We spoke.\par February 24, 2020, she had excision of atypia from the upper left breast, with preoperative localization.\par Oncoplastic closure: Dr. ALONSO Gupta.\par Surgical pathology:\par No residual atypia.\par + Intraductal papillomata.\par + Fibrocystic changes, with usual duct hyperplasia, and apocrine metaplasia.\par \par Presently, no further surgery is warranted.\par \par Due to the original atypia, I have recommended a medical oncology consultation to discuss risk reduction therapy.\par She understands and agrees\par

## 2022-03-03 NOTE — HISTORY OF PRESENT ILLNESS
[de-identified] : She had [place] breast surgery [place]\par \par \par 46-year-old lady referred by her gynecologist (Dr. Justice JOHN) with the recent diagnosis of atypical ductal hyperplasia (focal ADH), AND an intraductal papilloma of the LEFT BREAST (12-1:00).\par \par This was an area of focal asymmetry identified on screening bilateral breast imaging, and subsequent diagnostic studies (below).\par \par Diagnosis was established on stereotactic biopsy performed at [facility].\par \par No signs or symptoms related to either breast.\par \par No previous breast biopsies.\par \par \par + FH:\par Mother: Breast cancer 64.\par She did not have genetic testing.\par No other relatives with breast cancer.\par \par A paternal aunt had ovarian cancer.\par \par Not Ashkenazi.\par \par \par No other family history of malignancy.\par \par \par Breast MRI -not yet.\par \par \par Menarche at 8.\par  3, para 3, first at 16.\par LMP: >1-year ago, but not postmenopausal according to hormone measurements through her gynecologist.\par ***In fact, she uses transdermal HRT.\par She understands that this should cease.\par \par \par Breast cancer risk score =31.4%\par \par \par 11/15/2021:\par Bilateral mammogram and sonogram at [facility]: BI-RADS 0.\par Diagnostic imaging of the left breast recommended.\par 2021:\par Diagnostic left mammogram and sonogram at [facility]: BI-RADS 4.\par \par \par \par PMD: \par Dr Amy WINKLER.\par \par No pacemaker or defibrillator.\par \par No anticoagulants.\par \par + Migraines.\par Neurology: Dr. Allegra PEGUERO.\par Treated with Aimovig.\par She also receives Botox injections.\par \par + Low back pain (LBP).\par + LLE sciatica.\par  she had lumbar fusion surgery at [facility].\par Orthopedics: Dr. Willam MORGAN.\par \par \par GYN: Dr. Justice JOHN.\par 2021 visit was unremarkable.\par \par \par Colonoscopy:\par At age 45 was normal, she does not recall the physician's name\par

## 2022-03-03 NOTE — PHYSICAL EXAM
[Normal] : supple, no neck mass and thyroid not enlarged [Normal Neck Lymph Nodes] : normal neck lymph nodes  [Normal Supraclavicular Lymph Nodes] : normal supraclavicular lymph nodes [Normal Axillary Lymph Nodes] : normal axillary lymph nodes [Normal] : normal appearance, no rash, nodules, vesicles, ulcers, erythema [de-identified] : Groins not examined [de-identified] : Below

## 2022-03-04 ENCOUNTER — NON-APPOINTMENT (OUTPATIENT)
Age: 47
End: 2022-03-04

## 2022-03-08 RX ORDER — DARIFENACIN HYDROBROMIDE 7.5 MG/1
7.5 TABLET, EXTENDED RELEASE ORAL
Qty: 1 | Refills: 6 | Status: DISCONTINUED | COMMUNITY
Start: 2019-07-17 | End: 2022-03-08

## 2022-03-08 RX ORDER — USTEKINUMAB 130 MG/26ML
SOLUTION INTRAVENOUS
Refills: 0 | Status: ACTIVE | COMMUNITY

## 2022-03-08 RX ORDER — DICLOFENAC SODIUM 75 MG/1
75 TABLET, DELAYED RELEASE ORAL
Qty: 30 | Refills: 0 | Status: DISCONTINUED | COMMUNITY
Start: 2021-12-03 | End: 2022-03-08

## 2022-03-08 RX ORDER — PANTOPRAZOLE 20 MG/1
20 TABLET, DELAYED RELEASE ORAL
Qty: 30 | Refills: 0 | Status: DISCONTINUED | COMMUNITY
Start: 2018-03-10 | End: 2022-03-08

## 2022-03-08 RX ORDER — SUCRALFATE 1 G/1
1 TABLET ORAL
Qty: 120 | Refills: 0 | Status: DISCONTINUED | COMMUNITY
Start: 2021-08-09 | End: 2022-03-08

## 2022-03-08 RX ORDER — FREMANEZUMAB-VFRM 225 MG/1.5ML
225 INJECTION SUBCUTANEOUS
Qty: 1 | Refills: 3 | Status: DISCONTINUED | COMMUNITY
Start: 2021-03-24 | End: 2022-03-08

## 2022-03-08 RX ORDER — PROMETHAZINE HYDROCHLORIDE 25 MG/1
25 TABLET ORAL
Qty: 45 | Refills: 0 | Status: DISCONTINUED | COMMUNITY
Start: 2021-08-09 | End: 2022-03-08

## 2022-03-08 RX ORDER — PREDNISONE 10 MG/1
10 TABLET ORAL
Qty: 21 | Refills: 0 | Status: DISCONTINUED | COMMUNITY
Start: 2021-09-16 | End: 2022-03-08

## 2022-03-08 RX ORDER — ALPRAZOLAM 0.25 MG/1
0.25 TABLET ORAL 3 TIMES DAILY
Qty: 90 | Refills: 0 | Status: ACTIVE | COMMUNITY
Start: 2018-02-09

## 2022-03-08 RX ORDER — CIPROFLOXACIN HYDROCHLORIDE 500 MG/1
500 TABLET, FILM COATED ORAL
Qty: 10 | Refills: 0 | Status: DISCONTINUED | COMMUNITY
Start: 2019-08-15 | End: 2022-03-08

## 2022-03-08 RX ORDER — BETAMETHASONE DIPROPIONATE 0.5 MG/ML
0.05 LOTION, AUGMENTED TOPICAL
Qty: 60 | Refills: 0 | Status: DISCONTINUED | COMMUNITY
Start: 2018-01-08 | End: 2022-03-08

## 2022-03-09 ENCOUNTER — OUTPATIENT (OUTPATIENT)
Dept: OUTPATIENT SERVICES | Facility: HOSPITAL | Age: 47
LOS: 1 days | Discharge: HOME | End: 2022-03-09

## 2022-03-09 ENCOUNTER — RESULT REVIEW (OUTPATIENT)
Age: 47
End: 2022-03-09

## 2022-03-09 ENCOUNTER — APPOINTMENT (OUTPATIENT)
Dept: HEMATOLOGY ONCOLOGY | Facility: CLINIC | Age: 47
End: 2022-03-09
Payer: COMMERCIAL

## 2022-03-09 ENCOUNTER — NON-APPOINTMENT (OUTPATIENT)
Age: 47
End: 2022-03-09

## 2022-03-09 VITALS
HEIGHT: 58.98 IN | BODY MASS INDEX: 22 KG/M2 | TEMPERATURE: 97.2 F | RESPIRATION RATE: 16 BRPM | SYSTOLIC BLOOD PRESSURE: 116 MMHG | WEIGHT: 109.13 LBS | OXYGEN SATURATION: 97 % | HEART RATE: 77 BPM | DIASTOLIC BLOOD PRESSURE: 74 MMHG

## 2022-03-09 DIAGNOSIS — S82.90XA UNSPECIFIED FRACTURE OF UNSPECIFIED LOWER LEG, INITIAL ENCOUNTER FOR CLOSED FRACTURE: Chronic | ICD-10-CM

## 2022-03-09 DIAGNOSIS — C50.919 MALIGNANT NEOPLASM OF UNSPECIFIED SITE OF UNSPECIFIED FEMALE BREAST: ICD-10-CM

## 2022-03-09 DIAGNOSIS — N84.0 POLYP OF CORPUS UTERI: Chronic | ICD-10-CM

## 2022-03-09 DIAGNOSIS — Z80.3 FAMILY HISTORY OF MALIGNANT NEOPLASM OF BREAST: ICD-10-CM

## 2022-03-09 DIAGNOSIS — D25.9 LEIOMYOMA OF UTERUS, UNSPECIFIED: Chronic | ICD-10-CM

## 2022-03-09 DIAGNOSIS — Z98.51 TUBAL LIGATION STATUS: Chronic | ICD-10-CM

## 2022-03-09 DIAGNOSIS — Z98.890 OTHER SPECIFIED POSTPROCEDURAL STATES: Chronic | ICD-10-CM

## 2022-03-09 DIAGNOSIS — Z98.1 ARTHRODESIS STATUS: Chronic | ICD-10-CM

## 2022-03-09 DIAGNOSIS — L40.9 PSORIASIS, UNSPECIFIED: ICD-10-CM

## 2022-03-09 LAB
BASOPHILS # BLD AUTO: 0.06 K/UL — SIGNIFICANT CHANGE UP (ref 0–0.2)
BASOPHILS NFR BLD AUTO: 0.5 % — SIGNIFICANT CHANGE UP (ref 0–2)
EOSINOPHIL # BLD AUTO: 0.16 K/UL — SIGNIFICANT CHANGE UP (ref 0–0.5)
EOSINOPHIL NFR BLD AUTO: 1.3 % — SIGNIFICANT CHANGE UP (ref 0–6)
HCT VFR BLD CALC: 39.3 % — SIGNIFICANT CHANGE UP (ref 34.5–45)
HGB BLD-MCNC: 12.9 G/DL — SIGNIFICANT CHANGE UP (ref 11.5–15.5)
IMM GRANULOCYTES NFR BLD AUTO: 0.7 % — SIGNIFICANT CHANGE UP (ref 0–1.5)
LYMPHOCYTES # BLD AUTO: 25.6 % — SIGNIFICANT CHANGE UP (ref 13–44)
LYMPHOCYTES # BLD AUTO: 3.24 K/UL — SIGNIFICANT CHANGE UP (ref 1–3.3)
MCHC RBC-ENTMCNC: 30.1 PG — SIGNIFICANT CHANGE UP (ref 27–34)
MCHC RBC-ENTMCNC: 32.8 G/DL — SIGNIFICANT CHANGE UP (ref 32–36)
MCV RBC AUTO: 91.8 FL — SIGNIFICANT CHANGE UP (ref 80–100)
MONOCYTES # BLD AUTO: 0.68 K/UL — SIGNIFICANT CHANGE UP (ref 0–0.9)
MONOCYTES NFR BLD AUTO: 5.4 % — SIGNIFICANT CHANGE UP (ref 2–14)
NEUTROPHILS # BLD AUTO: 8.43 K/UL — HIGH (ref 1.8–7.4)
NEUTROPHILS NFR BLD AUTO: 66.5 % — SIGNIFICANT CHANGE UP (ref 43–77)
NRBC # BLD: 0 /100 WBCS — SIGNIFICANT CHANGE UP (ref 0–0)
PLATELET # BLD AUTO: 216 K/UL — SIGNIFICANT CHANGE UP (ref 150–400)
RBC # BLD: 4.28 M/UL — SIGNIFICANT CHANGE UP (ref 3.8–5.2)
RBC # FLD: 13.4 % — SIGNIFICANT CHANGE UP (ref 10.3–14.5)
WBC # BLD: 12.66 K/UL — HIGH (ref 3.8–10.5)
WBC # FLD AUTO: 12.66 K/UL — HIGH (ref 3.8–10.5)

## 2022-03-09 PROCEDURE — 99205 OFFICE O/P NEW HI 60 MIN: CPT

## 2022-03-10 ENCOUNTER — NON-APPOINTMENT (OUTPATIENT)
Age: 47
End: 2022-03-10

## 2022-03-15 LAB
25(OH)D3 SERPL-MCNC: 20.6 NG/ML
ALBUMIN SERPL ELPH-MCNC: 4.7 G/DL
ALP BLD-CCNC: 88 U/L
ALT SERPL-CCNC: 24 U/L
ANION GAP SERPL CALC-SCNC: 12 MMOL/L
AST SERPL-CCNC: 14 U/L
BILIRUB SERPL-MCNC: 0.3 MG/DL
BUN SERPL-MCNC: 13 MG/DL
CALCIUM SERPL-MCNC: 9.5 MG/DL
CHLORIDE SERPL-SCNC: 101 MMOL/L
CO2 SERPL-SCNC: 25 MMOL/L
CREAT SERPL-MCNC: 0.5 MG/DL
EGFR: 116 ML/MIN/1.73M2
ESTROGEN SERPL-MCNC: 283 PG/ML
FSH SERPL-MCNC: 16.7 IU/L
GLUCOSE SERPL-MCNC: 118 MG/DL
POTASSIUM SERPL-SCNC: 3.1 MMOL/L
PROT SERPL-MCNC: 7.1 G/DL
SODIUM SERPL-SCNC: 138 MMOL/L

## 2022-03-17 NOTE — HISTORY OF PRESENT ILLNESS
[de-identified] : Ms. HERNAN ABREU is a 46 year old female here for an evaluation of breast cancer prevention. Her oncologic history is as follows:\par \par 11/15/2021:\par Bilateral mammogram and sonogram at [facility]: BI-RADS 0.\par Diagnostic imaging of the left breast recommended.\par 2021:\par Diagnostic left mammogram and sonogram at [facility]: BI-RADS 4.\par \par 2021: left breast bx\par intraductal papilloma with focal ADH\par \par 2022:\par Bilateral breast MRI at [facility]: BI-RADS 2.\par The area of enhancement extends 1.4 cm anterior to the clip, and 1.0 cm medial to the clip\par \par 2022, she had excision of atypia from the upper left breast, with preoperative localization.\par Oncoplastic closure: Dr. ALONSO Gupta.\par Surgical pathology:\par No residual atypia.\par + Intraductal papillomata.\par + Fibrocystic changes, with usual duct hyperplasia, and apocrine metaplasia.\par \par + FH:\par Mother: Breast cancer 64.\par She did not have genetic testing.\par No other relatives with breast cancer.\par \par Menarche at 8.\par  3, para 3, first at 16.\par LMP: >1-year ago, but not postmenopausal according to hormone measurements through her gynecologist.\par ***In fact, she uses transdermal HRT.\par She understands that this should cease.\par \par + Migraines.\par Neurology: Dr. Allegra PEGUERO.\par Treated with Aimovig.\par She also receives Botox injections.\par \par  she had lumbar fusion surgery at [facility].\par Orthopedics: Dr. Willam MORGAN.\par \par GYN: Dr. Justice JOHN.\par 2021 visit was unremarkable.\par \par Colonoscopy:\par At age 45 was normal, she does not recall the physician's name\par \par She has severe migraine, takes botox every 3 m, use meds PRN\par Insomnia and anxiety\par Psoriasis, on stelera every 3 m. Dr Dewitt, derm \par Herniated disc from trauma, s/p surgery \par Sister had genetic testing and has a VUS

## 2022-03-17 NOTE — CONSULT LETTER
[Dear  ___] : Dear  [unfilled], [Consult Letter:] : I had the pleasure of evaluating your patient, [unfilled]. [Please see my note below.] : Please see my note below. [Consult Closing:] : Thank you very much for allowing me to participate in the care of this patient.  If you have any questions, please do not hesitate to contact me. [Sincerely,] : Sincerely, [FreeTextEntry3] : Aura Cordero MD\par Division of Medical Oncology and Hematology\par [facility]\par [employer] [facility]\par [place], [place]

## 2022-03-17 NOTE — PHYSICAL EXAM
[Fully active, able to carry on all pre-disease performance without restriction] : Status 0 - Fully active, able to carry on all pre-disease performance without restriction [Normal] : bilateral breasts without nipple retraction, skin dimpling or palpable masses; the bilateral axillae are without adenopathy [de-identified] : healed from recent surgery

## 2022-03-17 NOTE — ASSESSMENT
[FreeTextEntry1] : Ms. HERNAN ABREU is a 46 year old premenopausal female who underwent left breast excision and was found to have a focus of atypical ductal hyperplasia. Excision didn’t show additional pathologic findings, specifically in -situ carcinoma or invasive lesion. She has h/o previous benign biopsy. Her mother  from breast cancer.\par \par I reviewed the epidemiology of preinvasive breast lesions with the patient and sister. Atypical ductal hyperplasia is a high risk lesion which puts her at increased risk for ipsilateral and contralateral invasive breast cancer. I discussed that risk of breast cancer in women with atypical hyperplasia is approximately 3-4 times that of the general population. I discussed the role of chemoprevention with endocrine therapy which would decrease the risk by 40-50%. There is no survival benefit associated with it. We discussed that endocrine therapy only prevents ER+ breast cancer and doesn’t decrease the risk for triple negative or HER 2 positive breast cancers. \par \par Patient has irregular periods. Chemically she is pre menopausal based on labs performed today in my office. She is a candidate for tamoxifen for 5 years. lI discussed the side effects of tamoxifen including but not limited to hot flashes, mood changes, fluid retention, vaginal dryness. cataracts, thromboembolic events, stroke, cardiovascular side effects and the risk of endometrial cancer. \par \par I reviewed that tamoxifen may stop her periods or can cause irregular bleeding. She will followup with her gynecologist every 6-12 months and report any unusual vaginal bleeding or spotting. I recommended her to see an ophthalmologist annually for cataract monitoring. Patient is a nonsmoker and has no cardiovascular risk factors. She has no personal or family history of coagulation disorders.  She will continue to follow with her primary care physician for evaluation and management of risk factors for stroke. I educated about signs and symptoms of DVT/PE. Patient will report if she develops acute onset chest pain shortness of breath, leg swelling or calf pain. She understands the thrombotic risk and will hold tamoxifen prior to surgery or prolonged travel. I recommend her to take ASA 81 to decrease risk of VTE/CVA. \par \par We discussed the role of exercise, limited alcohol consumption and healthy lifestyle in breast cancer risk reduction. \par \par Given family history, she is referred for genetic testing. \par \par After understanding the risks and benefits of tamoxifen, patient has decided to start tamoxifen. She will continue annual mammogram with Dr Beg. I will see her back in 3 months or sooner if she develops any side effects.\par \par The patient had plenty of time to ask questions and all of her questions were answered to her satisfaction. I gave her my office phone number and encouraged her to call with any questions or additional information.\par

## 2022-03-22 ENCOUNTER — APPOINTMENT (OUTPATIENT)
Dept: NEUROLOGY | Facility: CLINIC | Age: 47
End: 2022-03-22
Payer: COMMERCIAL

## 2022-03-22 VITALS
SYSTOLIC BLOOD PRESSURE: 108 MMHG | BODY MASS INDEX: 21.97 KG/M2 | HEART RATE: 72 BPM | HEIGHT: 59 IN | DIASTOLIC BLOOD PRESSURE: 73 MMHG | WEIGHT: 109 LBS

## 2022-03-22 DIAGNOSIS — G43.909 MIGRAINE, UNSPECIFIED, NOT INTRACTABLE, W/OUT STATUS MIGRAINOSUS: ICD-10-CM

## 2022-03-22 PROCEDURE — 64615 CHEMODENERV MUSC MIGRAINE: CPT

## 2022-03-22 NOTE — HISTORY OF PRESENT ILLNESS
[FreeTextEntry1] : \par 46-year-old woman who is here for follow-up of her chronic migraine.  Since June patient has been doing well on the Aimovig however over the past month patient has recurrence of her migraine despite the Aimovig.  Patient has had no medication changes or infection that may account for her migraine exacerbation.\par \par interval history: she will be going to [facility] to visit her son for a month\par \par Interval history: Due to some complications she will is not able to go to [facility] to visit her son.  Patient was a last minute add-on as she had new onset of right-sided headaches that are pounding in nature.  This is different from her previous headaches.  She was agreeable to occipital nerve block.\par \par interval history: she is here for botox injections\par \par interval history: she has some migraines about 2 since last month.\par \par Interval history: Since her last visit patient had a breast biopsy of papilloma breast cancer.  Patient is upset about the scar on her breast and patient is debating whether she should start tamoxifen.  Patient was advised to follow-up with her breast oncologist for further discussion of treatment options.

## 2022-03-22 NOTE — DISCUSSION/SUMMARY
[FreeTextEntry1] : 46-year-old woman who is here for Botox injections for her chronic migraine.  We had a lengthy discussion regarding her stressors from her recent surgery and diagnosis.  Reassurance was given and patient was encouraged to discuss any questions she may have with her oncologist.  Patient will return for her Botox injection in 3 months.\par \par I spent the time noted on the day of this patient encounter preparing for, providing and documenting the above E/M service and counseling and educate patient on differential, workup, disease course, and treatment/management. Education was provided to the patient during this encounter. All questions and concerns were answered and addressed in detail. The patient verbalized understanding and agreed to plan. Patient was advised to continue to monitor for neurologic symptoms and to notify my office or go to the nearest emergency room if there are any changes. Any orders/referrals and communications were provided as well. \par Side effects of the above medications were discussed in detail including but not limited to applicable black box warning and teratogenicity as appropriate. \par Patient was advised to bring previous records to my office. \par \par \par

## 2022-03-30 ENCOUNTER — NON-APPOINTMENT (OUTPATIENT)
Age: 47
End: 2022-03-30

## 2022-03-31 ENCOUNTER — APPOINTMENT (OUTPATIENT)
Dept: OBGYN | Facility: CLINIC | Age: 47
End: 2022-03-31
Payer: COMMERCIAL

## 2022-03-31 PROCEDURE — 99213 OFFICE O/P EST LOW 20 MIN: CPT | Mod: 95

## 2022-03-31 NOTE — COUNSELING
[Nutrition/ Exercise/ Weight Management] : nutrition, exercise, weight management [Body Image] : body image [Vaccines] : vaccines [Lab Results] : lab results

## 2022-03-31 NOTE — HISTORY OF PRESENT ILLNESS
[FreeTextEntry1] : pt called because she was diagnosed with breast cancer and they want to start her on tamoxifen . She started and got a sick feeling in her stomach as well as being afraid of uterine cancer . I discussed with ther the benefits outway the potential of uterine cancer . She also has not gottewn a period in two years . I discussed the results of her estrogen levels as well as fsh and wa snot sure why she hasn't gottewn a period . i discussed a progesterone with robert saxena and she agreed . She is not sure of the hormone receptor status of the cancer as well .

## 2022-04-05 ENCOUNTER — OUTPATIENT (OUTPATIENT)
Dept: OUTPATIENT SERVICES | Facility: HOSPITAL | Age: 47
LOS: 1 days | Discharge: HOME | End: 2022-04-05

## 2022-04-05 DIAGNOSIS — D25.9 LEIOMYOMA OF UTERUS, UNSPECIFIED: Chronic | ICD-10-CM

## 2022-04-05 DIAGNOSIS — Z98.890 OTHER SPECIFIED POSTPROCEDURAL STATES: Chronic | ICD-10-CM

## 2022-04-05 DIAGNOSIS — Z98.51 TUBAL LIGATION STATUS: Chronic | ICD-10-CM

## 2022-04-05 DIAGNOSIS — Z98.1 ARTHRODESIS STATUS: Chronic | ICD-10-CM

## 2022-04-05 DIAGNOSIS — S82.90XA UNSPECIFIED FRACTURE OF UNSPECIFIED LOWER LEG, INITIAL ENCOUNTER FOR CLOSED FRACTURE: Chronic | ICD-10-CM

## 2022-04-05 DIAGNOSIS — N84.0 POLYP OF CORPUS UTERI: Chronic | ICD-10-CM

## 2022-04-05 DIAGNOSIS — C50.919 MALIGNANT NEOPLASM OF UNSPECIFIED SITE OF UNSPECIFIED FEMALE BREAST: ICD-10-CM

## 2022-04-08 ENCOUNTER — APPOINTMENT (OUTPATIENT)
Dept: NEUROLOGY | Facility: CLINIC | Age: 47
End: 2022-04-08

## 2022-04-11 ENCOUNTER — APPOINTMENT (OUTPATIENT)
Dept: HEMATOLOGY ONCOLOGY | Facility: CLINIC | Age: 47
End: 2022-04-11
Payer: COMMERCIAL

## 2022-04-11 VITALS
TEMPERATURE: 98 F | HEIGHT: 58.98 IN | BODY MASS INDEX: 21.77 KG/M2 | OXYGEN SATURATION: 99 % | RESPIRATION RATE: 16 BRPM | SYSTOLIC BLOOD PRESSURE: 113 MMHG | WEIGHT: 108 LBS | HEART RATE: 65 BPM | DIASTOLIC BLOOD PRESSURE: 79 MMHG

## 2022-04-11 DIAGNOSIS — F41.9 ANXIETY DISORDER, UNSPECIFIED: ICD-10-CM

## 2022-04-11 PROCEDURE — 99214 OFFICE O/P EST MOD 30 MIN: CPT

## 2022-04-11 RX ORDER — ONDANSETRON 8 MG/1
8 TABLET, ORALLY DISINTEGRATING ORAL EVERY 8 HOURS
Qty: 30 | Refills: 0 | Status: ACTIVE | COMMUNITY
Start: 2022-04-11 | End: 1900-01-01

## 2022-04-11 NOTE — HISTORY OF PRESENT ILLNESS
[de-identified] : Ms. HERNAN ABREU is a 46 year old female here for an evaluation of breast cancer prevention. Her oncologic history is as follows:\par \par 11/15/2021:\par Bilateral mammogram and sonogram at [facility]: BI-RADS 0.\par Diagnostic imaging of the left breast recommended.\par 2021:\par Diagnostic left mammogram and sonogram at [facility]: BI-RADS 4.\par \par 2021: left breast bx\par intraductal papilloma with focal ADH\par \par 2022:\par Bilateral breast MRI at [facility]: BI-RADS 2.\par The area of enhancement extends 1.4 cm anterior to the clip, and 1.0 cm medial to the clip\par \par 2022, she had excision of atypia from the upper left breast, with preoperative localization.\par Oncoplastic closure: Dr. ALONSO Gupta.\par Surgical pathology:\par No residual atypia.\par + Intraductal papillomata.\par + Fibrocystic changes, with usual duct hyperplasia, and apocrine metaplasia.\par \par + FH:\par Mother: Breast cancer 64.\par She did not have genetic testing.\par No other relatives with breast cancer.\par \par Menarche at 8.\par  3, para 3, first at 16.\par LMP: >1-year ago, but not postmenopausal according to hormone measurements through her gynecologist.\par ***In fact, she uses transdermal HRT. She understands that this should cease.\par GYN: Dr. Justice JOHN.\par 2021 visit was unremarkable.\par \par \par + Migraines.\par Neurology: Dr. Allegra PEGUERO.\par Treated with Aimovig.\par She also receives Botox injections.\par \par  she had lumbar fusion surgery at [facility].\par Orthopedics: Dr. Willam MORGAN.\par \par \par \par Colonoscopy:\par At age 45 was normal, she does not recall the physician's name\par \par She has severe migraine, takes botox every 3 m, use meds PRN\par Insomnia and anxiety\par Psoriasis, on stelera every 3 m. Dr Dewitt, derm \par Herniated disc from trauma, s/p surgery \par Sister had genetic testing and has a VUS  [de-identified] : Ms. HERNAN ABREU is a 46 year old premenopausal female who underwent left breast bx and was found to have a focus of atypical ductal hyperplasia 2021. Excision 2022 didn’t show additional pathologic findings, specifically in -situ carcinoma or invasive lesion. She has h/o previous benign biopsy. Her mother  from breast cancer. Tamoxifen started 3/2022\par \par She saw Dr Knowles. She isn't having periods x 2 yrs but estrogen level in premenopausal range. She is s/p progesterone treatment but no periods yet. She will f/u with gyn\par She took tamoxifen x 2 days. Took at night. Woke up with nausea in am . No vomiting. She had poor appetite. She stopped tamoxifen after 2 days and sx resolved. \par Since she is premenopausal, tamoxifen is the only option. She does have anxiety and takes xanax PRN and Ambien HS. Rec to see psychiatrist re anxiety\par Try tamoxifen after breakfast or lunch, try zofran PRN\par genetic testing pending

## 2022-04-11 NOTE — ASSESSMENT
[FreeTextEntry1] : Ms. HERNAN ABREU is a 46 year old premenopausal female who underwent left breast bx and was found to have a focus of atypical ductal hyperplasia 2021. Excision 2022 didn’t show additional pathologic findings, specifically in -situ carcinoma or invasive lesion. She has h/o previous benign biopsy. Her mother  from breast cancer. Tamoxifen started 3/2022\par \par - Breast ca: She took tamoxifen x 2 days. Took at night. Woke up with nausea in am . No vomiting. She had poor appetite. She stopped tamoxifen after 2 days and sx resolved. \par Since she is premenopausal, tamoxifen is the only option. She does have anxiety and takes xanax PRN and Ambien HS. Rec to see psychiatrist re anxiety\par Try tamoxifen after breakfast or lunch, try zofran PRN\par - Irregular periods: She saw Dr Knowles. She isn't having periods x 2 yrs but estrogen level in premenopausal range. She is s/p progesterone treatment but no periods yet. She will f/u with gyn\par - Patient is aware of signs and symptoms of DVT/PE. Patient will report if she develops acute onset chest pain shortness of breath, leg swelling or calf pain. \par - Genetic testing pending\par RTC 2 m\par \par \par

## 2022-04-11 NOTE — CONSULT LETTER
[Dear  ___] : Dear  [unfilled], [Consult Letter:] : I had the pleasure of evaluating your patient, [unfilled]. [Please see my note below.] : Please see my note below. [Consult Closing:] : Thank you very much for allowing me to participate in the care of this patient.  If you have any questions, please do not hesitate to contact me. [Sincerely,] : Sincerely, [FreeTextEntry3] : Aura Cordero MD\par Division of Medical Oncology and Hematology\par [facility]\par [employer] [facility]\par [place], [place]

## 2022-04-21 ENCOUNTER — APPOINTMENT (OUTPATIENT)
Dept: OBGYN | Facility: CLINIC | Age: 47
End: 2022-04-21
Payer: MEDICARE

## 2022-04-21 DIAGNOSIS — N95.1 MENOPAUSAL AND FEMALE CLIMACTERIC STATES: ICD-10-CM

## 2022-04-21 PROCEDURE — 99213 OFFICE O/P EST LOW 20 MIN: CPT | Mod: 95

## 2022-04-21 NOTE — HISTORY OF PRESENT ILLNESS
[Other Location: e.g. School (Enter Location, City,State)___] : at [unfilled], at the time of the visit. [Other Location: e.g. Home (Enter Location, City,State)___] : at [unfilled] [Verbal consent obtained from patient] : the patient, [unfilled] [FreeTextEntry1] : discussed the current management plans they have for her breast cancer . She did not have a withdrawel bleed . No spotting as well . Discussed the lab work as well . take medication again obtain the pap smear results .

## 2022-04-26 ENCOUNTER — APPOINTMENT (OUTPATIENT)
Dept: HEMATOLOGY ONCOLOGY | Facility: CLINIC | Age: 47
End: 2022-04-26

## 2022-04-26 ENCOUNTER — LABORATORY RESULT (OUTPATIENT)
Age: 47
End: 2022-04-26

## 2022-04-26 NOTE — DISCUSSION/SUMMARY
[FreeTextEntry1] : REASON FOR CONSULT\par Evelia Funez is a 46-year-old female who was referred by Dr. Aura Cordero for cancer genetic counseling and risk assessment due to a family history of breast cancer. \par \par RELEVANT MEDICAL HISTORY\par Ms. Funez is a healthy individual who has never had cancer. She has a personal history of left breast intraductal papilloma with focal ADH at age 46. Ms. Funez is currently taking Tamoxifen. In addition, she has a family history of cancer, see below.\par \par OTHER MEDICAL AND SURGICAL HISTORY:\par Migrains. Psoriasis. Anxiety. Ovary cystectomy. Back surgery. Hip tear repair. Tubal ligation. Tibia/fibula repair. Uterine fibroids removed multiple times. \par \par PAST OB/GYN HISTORY:\par Obstetrical History: \par Age at Menarche: 8\par Perimenopausal (unsure; LMP 2 years ago)\par Age at First Live Birth: 16\par Contraceptive Use: Yes, Depo-Provera for 5 years, IUD for 3 weeks, and OCP for 1 years. \par Hormone Replacement Therapy: Yes, formerly, transdermal progesterone patch for over one-year, last used 2021. \par Irregular periods but elevated estrogen levels, currently being worked up (pelvic sonogram pending)\par \par CANCER SCREENING HISTORY:  \par Breast: Last screening mammogram and sonogram 11/15/2021, heterogeneously dense breasts noted, and left breast diagnostic mammogram and ultrasound recommended, mass was then biopsied (see above). Frequency: yearly. Last breast MRI 2022, no additional findings noted. \par GYN: Last pap 2021, normal. Frequency: yearly. \par Colon: Last colonoscopy , no polyps reported. Frequency: every 5 years. \par Skin: Last visit 2022 for psoriasis. No skin exams and concerns reported today. \par \par SOCIAL HISTORY:\par •	\par •	Tobacco-product use: No\par \par FAMILY HISTORY:\par Maternal and paternal ancestry was reported as Mexican. No Ashkenazi Yazidi heritage reported. A detailed family history of cancer was ascertained, see below and scanned chart for pedigree. \par \par Of note, Ms. Funez reported her sister pursued genetic testing for a panel of genes and was found to have one variant of uncertain significance, unsure which gene. Copy of report was not available for review but was requested today. \par 	\par 	RISK ASSESSMENT:\par Ms. Funez’s family history of breast cancer is not highly suggestive of a hereditary cancer predisposition syndrome. However, Ms. Funez reported that the genetic testing results will help Dr. Cordero guide her breast screening and help her GYN decide if surgical intervention is needed for her gynecological issues (high estrogen but no menses despite medication to induce). We discussed genetic testing for a guidelines-based breast and gyn cancer panel. This test analyzes 19 genes: DIONTE, BARD1, BRCA1, BRCA2, BRIP1, CDH1, CHEK2, EPCAM, MLH1, MSH2, MSH6, NF1, PALB2, PMS2, PTEN, RAD51C, RAD51D, STK11, TP53.\par \par We discussed the risks, benefits and limitations, and implications of genetic testing. We also discussed the psychosocial implications of genetic testing. Possible test results were reviewed with Ms. Funez, along with associated medical management options. The Genetic Information Non-discrimination Act (GABE) was also reviewed.\par \par Ms. Funez consented to the above-mentioned genetic testing panel. Blood was drawn in our laboratory and sent to [facility] today.\par \par PLAN:\par \par 1.	Blood drawn today will be sent to [facility] for analysis. \par 2.	We will contact Ms. Funez to schedule a follow-up appointment once the results are available. Results generally return in 2-3 weeks. \par \par \par For any additional questions please call Cancer Genetics at [phone]. \par \par \par Rikki Singleton, MS, [facility]\par Genetic Counselor, Cancer Genetics\par \par \par CC: \par Dr. Aura Cordero

## 2022-05-03 ENCOUNTER — NON-APPOINTMENT (OUTPATIENT)
Age: 47
End: 2022-05-03

## 2022-05-05 ENCOUNTER — NON-APPOINTMENT (OUTPATIENT)
Age: 47
End: 2022-05-05

## 2022-05-12 ENCOUNTER — APPOINTMENT (OUTPATIENT)
Dept: OBGYN | Facility: CLINIC | Age: 47
End: 2022-05-12

## 2022-05-19 ENCOUNTER — APPOINTMENT (OUTPATIENT)
Dept: OBGYN | Facility: CLINIC | Age: 47
End: 2022-05-19
Payer: MEDICARE

## 2022-05-19 DIAGNOSIS — N91.1 SECONDARY AMENORRHEA: ICD-10-CM

## 2022-05-19 PROCEDURE — 99213 OFFICE O/P EST LOW 20 MIN: CPT | Mod: 95

## 2022-05-19 NOTE — HISTORY OF PRESENT ILLNESS
[Home] : at home, [unfilled] , at the time of the visit. [Other Location: e.g. Home (Enter Location, City,State)___] : at [unfilled] [Verbal consent obtained from patient] : the patient, [unfilled] [FreeTextEntry1] : called the patient to discuss the options in medication therapy as well as the ultrasound of the pelvis . She has not had a period for two years . The hormone studies on the breast cancer were negative . She has an appointment with another oncologist to see what else could be offered .

## 2022-05-23 ENCOUNTER — APPOINTMENT (OUTPATIENT)
Dept: SURGICAL ONCOLOGY | Facility: CLINIC | Age: 47
End: 2022-05-23
Payer: MEDICARE

## 2022-05-23 VITALS
TEMPERATURE: 97.6 F | HEIGHT: 58 IN | HEART RATE: 69 BPM | WEIGHT: 106 LBS | SYSTOLIC BLOOD PRESSURE: 111 MMHG | OXYGEN SATURATION: 98 % | BODY MASS INDEX: 22.25 KG/M2 | DIASTOLIC BLOOD PRESSURE: 77 MMHG | RESPIRATION RATE: 16 BRPM

## 2022-05-23 PROCEDURE — 99024 POSTOP FOLLOW-UP VISIT: CPT

## 2022-05-23 NOTE — REVIEW OF SYSTEMS
[Negative] : Endocrine [de-identified] : Low back pain [de-identified] : Migraines [FreeTextEntry1] : Increased risk of breast cancer

## 2022-05-23 NOTE — PHYSICAL EXAM
[Normal] : supple, no neck mass and thyroid not enlarged [Normal Neck Lymph Nodes] : normal neck lymph nodes  [Normal Supraclavicular Lymph Nodes] : normal supraclavicular lymph nodes [Normal Axillary Lymph Nodes] : normal axillary lymph nodes [Normal] : normal appearance, no rash, nodules, vesicles, ulcers, erythema [de-identified] : Groins not examined [de-identified] : Below

## 2022-05-23 NOTE — REASON FOR VISIT
[Post-Op] : a post-op for [Other: _____] : [unfilled] [FreeTextEntry2] : Excision of left breast atypia with oncoplastic closure, breast cancer risk score = 31.4

## 2022-05-23 NOTE — ASSESSMENT
[FreeTextEntry1] : Clinically doing well after the excision of left breast atypia in February 2022.\par \par Her preoperative breast MRI (January 2022): BI-RADS 2.\par \par + Tamoxifen since March 2022.\par \par She should have a 6-month postoperative mammogram of the left to reestablish a baseline on the surgical side.\par Prescription entered.\par \par Further management based upon above results.\par \par If asymptomatic, with normal imaging, we will see her yearly, sooner if needed

## 2022-05-23 NOTE — END OF VISIT
[Time Spent: ___ minutes] : I have spent [unfilled] minutes of time on the encounter. Tested positive for COVID-19 on 4/4, symptomatic with shortness of breath, lethargy, weakness, worsening after monoclonal antibody therapy  - symptomatic for the past 5 days, has had sick contacts, multiple family members positive   - COVID PCR positive, CXR consistent with COVID pneumonia  - procalcitonin elevated to 20, likely also increased in setting of ESRD   - currently saturating well on RA  - started on Xarelto 10 mg PO daily x 2 weeks for thombophylaxis by hematologist; will not start due to kidney function  - hold chemical prophylaxis at this time until platelets improve   - no evidence of fevers ; no indications for remdesivir/dexamethasone at this time [>50% of the face to face encounter time was spent on counseling and/or coordination of care for ___] : Greater than 50% of the face to face encounter time was spent on counseling and/or coordination of care for [unfilled]

## 2022-05-23 NOTE — HISTORY OF PRESENT ILLNESS
[de-identified] : She had been [place] breast surgery [place]\par \par \par 46-year-old lady.\par \par \par 2022:\par Excision of atypia from her left breast (upper).\par Oncoplastic closure: Dr. ALONSO SNELL.\par Surgical pathology:\par Intraductal papilloma, fibrocystic change with duct hyperplasia and apocrine metaplasia.\par \par He is planning fat grafting because she has a little indentation from the lumpectomy.\par \par \par 2022:\par Preoperative breast MRI at 450: BI-RADS 2.\par \par \par Postoperatively she met with medical oncology (Dr. Aura CORDERO).\par + Tamoxifen, started 2022.\par SELF-D/D'd May 2022 secondary to side effects.\par \par Dr. Cordero is aware.\par Patient is not a candidate for other therapies (such as aromatase inhibitors) since she is currently premenopausal.\par We will revisit this topic after her menopause.....................\par \par \par 2022:\par She was referred by her gynecologist (Dr. Justice JOHN) with  atypical ductal hyperplasia (focal ADH), AND an intraductal papilloma of the LEFT BREAST (12-1:00).\par \par This was an area of focal asymmetry identified on screening bilateral breast imaging, and subsequent diagnostic studies (below).\par \par Diagnosis was established on stereotactic biopsy performed at [place].\par \par No signs or symptoms related to either breast.\par \par No previous breast biopsies.\par \par \par + FH:\par Mother: Breast cancer 64.\par She did not have genetic testing.\par No other relatives with breast cancer.\par \par A paternal aunt had ovarian cancer.\par \par Not Ashkenazi.\par \par \par Our patient had genetic testing at [facility] through medical oncology in : NO deleterious mutations\par \par \par No other family history of malignancy.\par \par \par Menarche at 8.\par  3, para 3, first at 16.\par LMP: >1-year ago, but not postmenopausal according to hormone measurements through her gynecologist.\par ***In fact, she uses transdermal HRT.\par She understands that this should cease.\par \par \par Breast cancer risk score =31.4%\par \par \par 11/15/2021:\par Bilateral mammogram and sonogram at [place]: BI-RADS 0.\par Diagnostic imaging of the left breast recommended.\par 2021:\par Diagnostic left mammogram and sonogram at [place]: BI-RADS 4.\par \par \par \par PMD: \par Dr Amy WINKLER.\par \par No pacemaker or defibrillator.\par \par No anticoagulants.\par \par + Migraines.\par Neurology: Dr. Allegra PEGUERO.\par Treated with Aimovig.\par She also receives Botox injections.\par \par + Low back pain (LBP).\par + LLE sciatica.\par  she had lumbar fusion surgery at [facility].\par Orthopedics: Dr. Willam MORGAN.\par \par \par GYN: Dr. Justice JOHN.\par 2021 visit was unremarkable.\par \par \par Colonoscopy:\par At age 45 was normal, she does not recall the physician's name\par

## 2022-06-09 ENCOUNTER — OUTPATIENT (OUTPATIENT)
Dept: OUTPATIENT SERVICES | Facility: HOSPITAL | Age: 47
LOS: 1 days | Discharge: HOME | End: 2022-06-09

## 2022-06-09 DIAGNOSIS — Z98.890 OTHER SPECIFIED POSTPROCEDURAL STATES: Chronic | ICD-10-CM

## 2022-06-09 DIAGNOSIS — Z98.51 TUBAL LIGATION STATUS: Chronic | ICD-10-CM

## 2022-06-09 DIAGNOSIS — N84.0 POLYP OF CORPUS UTERI: Chronic | ICD-10-CM

## 2022-06-09 DIAGNOSIS — D25.9 LEIOMYOMA OF UTERUS, UNSPECIFIED: Chronic | ICD-10-CM

## 2022-06-09 DIAGNOSIS — Z98.1 ARTHRODESIS STATUS: Chronic | ICD-10-CM

## 2022-06-09 DIAGNOSIS — S82.90XA UNSPECIFIED FRACTURE OF UNSPECIFIED LOWER LEG, INITIAL ENCOUNTER FOR CLOSED FRACTURE: Chronic | ICD-10-CM

## 2022-06-09 DIAGNOSIS — C50.919 MALIGNANT NEOPLASM OF UNSPECIFIED SITE OF UNSPECIFIED FEMALE BREAST: ICD-10-CM

## 2022-06-13 ENCOUNTER — APPOINTMENT (OUTPATIENT)
Dept: SURGICAL ONCOLOGY | Facility: CLINIC | Age: 47
End: 2022-06-13

## 2022-06-16 ENCOUNTER — APPOINTMENT (OUTPATIENT)
Dept: OBGYN | Facility: CLINIC | Age: 47
End: 2022-06-16
Payer: MEDICARE

## 2022-06-16 PROCEDURE — 99213 OFFICE O/P EST LOW 20 MIN: CPT | Mod: 95

## 2022-06-16 NOTE — HISTORY OF PRESENT ILLNESS
[Other Location: e.g. School (Enter Location, City,State)___] : at [unfilled], at the time of the visit. [Other Location: e.g. Home (Enter Location, City,State)___] : at [unfilled] [Verbal consent obtained from patient] : the patient, [unfilled] [FreeTextEntry1] : pty was seen by oncologist and was told that she was told that elective oophorectomy was not going to help . Maily because the genetic testing was negaive . sHE WAS GIVEN DIFFERENT OPTIONS FOR MEDICATION PROPHYLAXIS AND WILL TRY LOWER DOSE OF TAMOXIFEN .

## 2022-06-17 ENCOUNTER — APPOINTMENT (OUTPATIENT)
Dept: HEMATOLOGY ONCOLOGY | Facility: CLINIC | Age: 47
End: 2022-06-17
Payer: MEDICARE

## 2022-06-20 ENCOUNTER — RESULT REVIEW (OUTPATIENT)
Age: 47
End: 2022-06-20

## 2022-06-20 ENCOUNTER — APPOINTMENT (OUTPATIENT)
Dept: HEMATOLOGY ONCOLOGY | Facility: CLINIC | Age: 47
End: 2022-06-20
Payer: MEDICARE

## 2022-06-20 ENCOUNTER — APPOINTMENT (OUTPATIENT)
Dept: NEUROLOGY | Facility: CLINIC | Age: 47
End: 2022-06-20
Payer: MEDICARE

## 2022-06-20 ENCOUNTER — APPOINTMENT (OUTPATIENT)
Dept: HEMATOLOGY ONCOLOGY | Facility: CLINIC | Age: 47
End: 2022-06-20

## 2022-06-20 VITALS
WEIGHT: 105 LBS | HEIGHT: 59 IN | SYSTOLIC BLOOD PRESSURE: 100 MMHG | DIASTOLIC BLOOD PRESSURE: 72 MMHG | HEART RATE: 74 BPM | BODY MASS INDEX: 21.17 KG/M2

## 2022-06-20 LAB
BASOPHILS # BLD AUTO: 0.03 K/UL — SIGNIFICANT CHANGE UP (ref 0–0.2)
BASOPHILS NFR BLD AUTO: 0.5 % — SIGNIFICANT CHANGE UP (ref 0–2)
EOSINOPHIL # BLD AUTO: 0.09 K/UL — SIGNIFICANT CHANGE UP (ref 0–0.5)
EOSINOPHIL NFR BLD AUTO: 1.6 % — SIGNIFICANT CHANGE UP (ref 0–6)
HCT VFR BLD CALC: 38.5 % — SIGNIFICANT CHANGE UP (ref 34.5–45)
HGB BLD-MCNC: 12.9 G/DL — SIGNIFICANT CHANGE UP (ref 11.5–15.5)
IMM GRANULOCYTES NFR BLD AUTO: 0.4 % — SIGNIFICANT CHANGE UP (ref 0–1.5)
LYMPHOCYTES # BLD AUTO: 1.69 K/UL — SIGNIFICANT CHANGE UP (ref 1–3.3)
LYMPHOCYTES # BLD AUTO: 31 % — SIGNIFICANT CHANGE UP (ref 13–44)
MCHC RBC-ENTMCNC: 30.4 PG — SIGNIFICANT CHANGE UP (ref 27–34)
MCHC RBC-ENTMCNC: 33.5 G/DL — SIGNIFICANT CHANGE UP (ref 32–36)
MCV RBC AUTO: 90.8 FL — SIGNIFICANT CHANGE UP (ref 80–100)
MONOCYTES # BLD AUTO: 0.39 K/UL — SIGNIFICANT CHANGE UP (ref 0–0.9)
MONOCYTES NFR BLD AUTO: 7.1 % — SIGNIFICANT CHANGE UP (ref 2–14)
NEUTROPHILS # BLD AUTO: 3.24 K/UL — SIGNIFICANT CHANGE UP (ref 1.8–7.4)
NEUTROPHILS NFR BLD AUTO: 59.4 % — SIGNIFICANT CHANGE UP (ref 43–77)
NRBC # BLD: 0 /100 WBCS — SIGNIFICANT CHANGE UP (ref 0–0)
PLATELET # BLD AUTO: 194 K/UL — SIGNIFICANT CHANGE UP (ref 150–400)
RBC # BLD: 4.24 M/UL — SIGNIFICANT CHANGE UP (ref 3.8–5.2)
RBC # FLD: 12.6 % — SIGNIFICANT CHANGE UP (ref 10.3–14.5)
WBC # BLD: 5.46 K/UL — SIGNIFICANT CHANGE UP (ref 3.8–10.5)
WBC # FLD AUTO: 5.46 K/UL — SIGNIFICANT CHANGE UP (ref 3.8–10.5)

## 2022-06-20 PROCEDURE — 64615 CHEMODENERV MUSC MIGRAINE: CPT

## 2022-06-20 PROCEDURE — 99214 OFFICE O/P EST MOD 30 MIN: CPT | Mod: 95

## 2022-06-20 PROCEDURE — 99212 OFFICE O/P EST SF 10 MIN: CPT | Mod: 25

## 2022-06-20 NOTE — HISTORY OF PRESENT ILLNESS
[FreeTextEntry1] : \par 46-year-old woman who is here for follow-up of her chronic migraine.  Since June patient has been doing well on the Aimovig however over the past month patient has recurrence of her migraine despite the Aimovig.  Patient has had no medication changes or infection that may account for her migraine exacerbation.\par \par interval history: she will be going to [facility] to visit her son for a month\par \par Interval history: Due to some complications she will is not able to go to [facility] to visit her son.  Patient was a last minute add-on as she had new onset of right-sided headaches that are pounding in nature.  This is different from her previous headaches.  She was agreeable to occipital nerve block.\par \par interval history: she is here for botox injections\par \par interval history: she has some migraines about 2 since last month.\par \par Interval history: Since her last visit patient had a breast biopsy of papilloma breast cancer.  Patient is upset about the scar on her breast and patient is debating whether she should start tamoxifen.  Patient was advised to follow-up with her breast oncologist for further discussion of treatment options.\par \par interval history: she is more accepting of her breast cancer diagnosis. She discussed her son needing a ped neurologist. info was given to her for peds neuro office.

## 2022-06-23 LAB
25(OH)D3 SERPL-MCNC: 50.3 NG/ML
ALBUMIN SERPL ELPH-MCNC: 4.8 G/DL
ALP BLD-CCNC: 92 U/L
ALT SERPL-CCNC: 29 U/L
ANION GAP SERPL CALC-SCNC: 13 MMOL/L
AST SERPL-CCNC: 21 U/L
BILIRUB SERPL-MCNC: 0.4 MG/DL
BUN SERPL-MCNC: 14 MG/DL
CALCIUM SERPL-MCNC: 9.8 MG/DL
CHLORIDE SERPL-SCNC: 104 MMOL/L
CO2 SERPL-SCNC: 23 MMOL/L
CREAT SERPL-MCNC: 0.48 MG/DL
EGFR: 117 ML/MIN/1.73M2
GLUCOSE SERPL-MCNC: 93 MG/DL
POTASSIUM SERPL-SCNC: 4.2 MMOL/L
PROT SERPL-MCNC: 7 G/DL
SODIUM SERPL-SCNC: 140 MMOL/L

## 2022-07-11 ENCOUNTER — APPOINTMENT (OUTPATIENT)
Dept: OBGYN | Facility: CLINIC | Age: 47
End: 2022-07-11

## 2022-07-11 ENCOUNTER — LABORATORY RESULT (OUTPATIENT)
Age: 47
End: 2022-07-11

## 2022-07-11 VITALS — WEIGHT: 106 LBS | SYSTOLIC BLOOD PRESSURE: 129 MMHG | DIASTOLIC BLOOD PRESSURE: 86 MMHG | BODY MASS INDEX: 21.41 KG/M2

## 2022-07-11 PROCEDURE — 99396 PREV VISIT EST AGE 40-64: CPT

## 2022-07-11 NOTE — HISTORY OF PRESENT ILLNESS
[FreeTextEntry1] : pt comes for pap . She has no complaints . She is on half the dose of tamoxifen . Tolerating that well .

## 2022-07-12 ENCOUNTER — TRANSCRIPTION ENCOUNTER (OUTPATIENT)
Age: 47
End: 2022-07-12

## 2022-07-12 ENCOUNTER — OUTPATIENT (OUTPATIENT)
Dept: OUTPATIENT SERVICES | Facility: HOSPITAL | Age: 47
LOS: 1 days | End: 2022-07-12
Payer: MEDICARE

## 2022-07-12 VITALS
DIASTOLIC BLOOD PRESSURE: 76 MMHG | HEIGHT: 59 IN | TEMPERATURE: 98 F | SYSTOLIC BLOOD PRESSURE: 117 MMHG | OXYGEN SATURATION: 100 % | WEIGHT: 105.82 LBS | RESPIRATION RATE: 16 BRPM | HEART RATE: 80 BPM

## 2022-07-12 DIAGNOSIS — N84.0 POLYP OF CORPUS UTERI: Chronic | ICD-10-CM

## 2022-07-12 DIAGNOSIS — Z98.890 OTHER SPECIFIED POSTPROCEDURAL STATES: Chronic | ICD-10-CM

## 2022-07-12 DIAGNOSIS — N64.89 OTHER SPECIFIED DISORDERS OF BREAST: ICD-10-CM

## 2022-07-12 DIAGNOSIS — Z98.51 TUBAL LIGATION STATUS: Chronic | ICD-10-CM

## 2022-07-12 DIAGNOSIS — S82.90XA UNSPECIFIED FRACTURE OF UNSPECIFIED LOWER LEG, INITIAL ENCOUNTER FOR CLOSED FRACTURE: Chronic | ICD-10-CM

## 2022-07-12 DIAGNOSIS — Z01.818 ENCOUNTER FOR OTHER PREPROCEDURAL EXAMINATION: ICD-10-CM

## 2022-07-12 DIAGNOSIS — Z98.1 ARTHRODESIS STATUS: Chronic | ICD-10-CM

## 2022-07-12 DIAGNOSIS — D25.9 LEIOMYOMA OF UTERUS, UNSPECIFIED: Chronic | ICD-10-CM

## 2022-07-12 PROCEDURE — G0463: CPT

## 2022-07-12 NOTE — H&P PST ADULT - NSICDXPASTSURGICALHX_GEN_ALL_CORE_FT
PAST SURGICAL HISTORY:  Fibroids removal- June 2014    History of bilateral tubal ligation     History of dilation and curettage     History of myomectomy     Lower leg fracture bilateral tib fib fx surgery- 1999    S/P lumpectomy, left breast 2/2022    S/P spinal fusion and laminectomy with hardware- 2009    Uterine polyp lap uterine polyp removal- january 2014

## 2022-07-12 NOTE — H&P PST ADULT - NS PRO REFERRAL CMGT
Time-based billing (NON-critical care)
Discharge planning

## 2022-07-12 NOTE — H&P PST ADULT - HISTORY OF PRESENT ILLNESS
46 year old female with pmhx of Psoriasis, insomnia, reports to PST for revision of left breast lumpectomy.  h/o abnormal mammogram in 11/2021, benign mammary dysplasia of left breast.  S/p excision left breast atypia w/wire mammo localization in 2/2022. Denies breast tenderness, breast lumps, nipple discharge.  Otherwise patient feels well and denies any acute symptoms.

## 2022-07-12 NOTE — H&P PST ADULT - ATTENDING COMMENTS
Patient seen and examined today in the holding area. There has been no change since the last clinic visit. No changes in her medial status. The patient is scheduled for revision of her left breast reconstruction with AFT and harvest from the abdomen and flanks. The risks, benefits and alternatives were discussed with the patient which included infection, bleeding, pain, scarring, asymmetry, nerve injury, wound healing complications, fat necrosis, breast asymmetry, lumps, DVT, PE. All of the patient's questions were answered and consent was obtained today.

## 2022-07-12 NOTE — H&P PST ADULT - NSICDXPASTMEDICALHX_GEN_ALL_CORE_FT
PAST MEDICAL HISTORY:  Anxiety     Endometriosis     Fibrocystic breast     Fibromyalgia     Herniated lumbar intervertebral disc     Migraines     Other specified disorders of breast     Ovarian cyst     Psoriasis     Tibial fracture     Unspecified benign mammary dysplasia of left breast     Uterine fibroid     Uterine polyp

## 2022-07-12 NOTE — ASSESSMENT
[FreeTextEntry1] : Ms. HERNAN ABREU is a 46 year old premenopausal female who underwent left breast bx and was found to have a focus of atypical ductal hyperplasia 2021. Excision 2022 didn’t show additional pathologic findings, specifically in -situ carcinoma or invasive lesion. She has h/o previous benign biopsy. Her mother  from breast cancer. Tamoxifen started 3/2022\par \par ADH\par \par She restarted tamoxifen 2022 .\par She stopped taking tamoxifen x 3 weeks ago \par She felt cloudy in her head, hot flashes \par s/p second opinion at [facility] high risk clinic ( seen by NP) - discussed low dose tamoxifen and contrast mammo/sono. She discussed imaging with Dr Ogden\par genetics- invitae negative 2022\par She is unable to tolerate full dose, doesn’t want unprotected either due to FH and personal history of ADH. Discussed low dose escalante based on small study ( n=500) published in J[employer]. https://www.ncbi.nlm.nih.gov/pmc/articles/HAY4580118/. \par We discussed it is not standard of care but can be tired in this patient due to intolerable s/e to full dose tamoxifen. Pt was in agreement \par RTO 3 M \par

## 2022-07-12 NOTE — HISTORY OF PRESENT ILLNESS
[de-identified] : Ms. HERNAN ABREU is a 46 year old female here for an evaluation of breast cancer prevention. Her oncologic history is as follows:\par \par 11/15/2021:\par Bilateral mammogram and sonogram at [place]: BI-RADS 0.\par Diagnostic imaging of the left breast recommended.\par 2021:\par Diagnostic left mammogram and sonogram at [place]: BI-RADS 4.\par \par 2021: left breast bx\par intraductal papilloma with focal ADH\par \par 2022:\par Bilateral breast MRI at [facility]: BI-RADS 2.\par The area of enhancement extends 1.4 cm anterior to the clip, and 1.0 cm medial to the clip\par \par 2022, she had excision of atypia from the upper left breast, with preoperative localization.\par Oncoplastic closure: Dr. ALONSO Gupta.\par Surgical pathology:\par No residual atypia.\par + Intraductal papillomata.\par + Fibrocystic changes, with usual duct hyperplasia, and apocrine metaplasia.\par \par + FH:\par Mother: Breast cancer 64.\par She did not have genetic testing.\par No other relatives with breast cancer.\par \par Menarche at 8.\par  3, para 3, first at 16.\par LMP: >1-year ago, but not postmenopausal according to hormone measurements through her gynecologist.\par ***In fact, she uses transdermal HRT. She understands that this should cease.\par GYN: Dr. Justice JOHN.\par 2021 visit was unremarkable.\par \par \par + Migraines.\par Neurology: Dr. Allegra PEGUERO.\par Treated with Aimovig.\par She also receives Botox injections.\par \par  she had lumbar fusion surgery at [facility].\par Orthopedics: Dr. Willam MORGAN.\par \par \par \par Colonoscopy:\par At age 45 was normal, she does not recall the physician's name\par \par She has severe migraine, takes botox every 3 m, use meds PRN\par Insomnia and anxiety\par Psoriasis, on stelera every 3 m. 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 She isn't having periods x 2 yrs but estrogen level in premenopausal range. She is s/p progesterone treatment but no periods yet. She will f/u with gyn\par She took tamoxifen x 2 days. Took at night. Woke up with nausea in am . No vomiting. She had poor appetite. She stopped tamoxifen after 2 days and sx resolved. \par Since she is premenopausal, tamoxifen is the only option. She does have anxiety and takes xanax PRN and Ambien HS. Rec to see psychiatrist re anxiety\par Try tamoxifen after breakfast or lunch, try zofran PRN\par genetic testing pending\par  [de-identified] : Ms. HERNAN ABREU is a 46 year old premenopausal female who underwent left breast bx and was found to have a focus of atypical ductal hyperplasia 2021. Excision 2022 didn’t show additional pathologic findings, specifically in -situ carcinoma or invasive lesion. She has h/o previous benign biopsy. Her mother  from breast cancer. Tamoxifen started 3/2022. Stopped after 2 days due to nausea. \par \par She restarted tamoxifen 2022 .\par She stopped taking tamoxifen x 3 weeks ago \par She felt cloudy in her head, hot flashes \par s/p second opinion [place] [facility] ( seen by NP) - discussed low dose tamoxifen and contrast mammo/sono. She discussed imaging with Dr Ogden\par genetics- invitae negative 2022\par She is unable to tolerate full dose, doesn’t want unprotected either due to FH and personal history of ADH. Discussed low dose escalante based on small study ( n=500) published in JCO. https://www.ncbi.nlm.nih.gov/pmc/articles/EVA5526651/. \par We discussed it is not standard of care but can be tired in this patient due to intolerable s/e to full dose tamoxifen. Pt was in agreement

## 2022-07-12 NOTE — H&P PST ADULT - LAST STRESS TEST
~3 years ago, normal as per pt, results with cardiologist. pt sent herself for routine testing. no cardiac disorders

## 2022-07-18 ENCOUNTER — TRANSCRIPTION ENCOUNTER (OUTPATIENT)
Age: 47
End: 2022-07-18

## 2022-07-25 ENCOUNTER — TRANSCRIPTION ENCOUNTER (OUTPATIENT)
Age: 47
End: 2022-07-25

## 2022-07-25 NOTE — ASU DISCHARGE PLAN (ADULT/PEDIATRIC) - CARE PROVIDER_API CALL
Aleida Gupta (MD)  Surgery  [address] [place] [id number]  Phone: [phone]  Fax: [phone]  Follow Up Time: 1 week

## 2022-07-25 NOTE — ASU DISCHARGE PLAN (ADULT/PEDIATRIC) - NS MD DC FALL RISK RISK
For information on Fall & Injury Prevention, visit: [email or web address] OR  [email or web address] OR  https://www.cdc.gov/steadi/patient.html

## 2022-07-25 NOTE — ASU DISCHARGE PLAN (ADULT/PEDIATRIC) - ASU DC SPECIAL INSTRUCTIONSFT
No heavy lifting, strenuous activity, or heavy lifting for 1 week  OK to shower after 48hrs, remove outer dressing  No tub baths or swimming  Avoid NSAIDs and ASA for 48hrs after the procedure

## 2022-07-26 ENCOUNTER — OUTPATIENT (OUTPATIENT)
Dept: OUTPATIENT SERVICES | Facility: HOSPITAL | Age: 47
LOS: 1 days | End: 2022-07-26
Payer: MEDICARE

## 2022-07-26 VITALS
HEART RATE: 71 BPM | TEMPERATURE: 98 F | DIASTOLIC BLOOD PRESSURE: 65 MMHG | RESPIRATION RATE: 14 BRPM | OXYGEN SATURATION: 98 % | SYSTOLIC BLOOD PRESSURE: 105 MMHG

## 2022-07-26 VITALS
HEART RATE: 61 BPM | RESPIRATION RATE: 12 BRPM | OXYGEN SATURATION: 100 % | WEIGHT: 105.82 LBS | SYSTOLIC BLOOD PRESSURE: 110 MMHG | TEMPERATURE: 98 F | DIASTOLIC BLOOD PRESSURE: 74 MMHG | HEIGHT: 59 IN

## 2022-07-26 DIAGNOSIS — Z98.1 ARTHRODESIS STATUS: Chronic | ICD-10-CM

## 2022-07-26 DIAGNOSIS — Z98.890 OTHER SPECIFIED POSTPROCEDURAL STATES: Chronic | ICD-10-CM

## 2022-07-26 DIAGNOSIS — N84.0 POLYP OF CORPUS UTERI: Chronic | ICD-10-CM

## 2022-07-26 DIAGNOSIS — D25.9 LEIOMYOMA OF UTERUS, UNSPECIFIED: Chronic | ICD-10-CM

## 2022-07-26 DIAGNOSIS — Z98.51 TUBAL LIGATION STATUS: Chronic | ICD-10-CM

## 2022-07-26 DIAGNOSIS — S82.90XA UNSPECIFIED FRACTURE OF UNSPECIFIED LOWER LEG, INITIAL ENCOUNTER FOR CLOSED FRACTURE: Chronic | ICD-10-CM

## 2022-07-26 DIAGNOSIS — N64.89 OTHER SPECIFIED DISORDERS OF BREAST: ICD-10-CM

## 2022-07-26 PROCEDURE — 15772 GRFG AUTOL FAT LIPO EA ADDL: CPT

## 2022-07-26 PROCEDURE — 15771 GRFG AUTOL FAT LIPO 50 CC/<: CPT

## 2022-07-26 RX ORDER — SODIUM CHLORIDE 9 MG/ML
1000 INJECTION, SOLUTION INTRAVENOUS
Refills: 0 | Status: DISCONTINUED | OUTPATIENT
Start: 2022-07-26 | End: 2022-07-26

## 2022-07-26 RX ORDER — ONDANSETRON 8 MG/1
4 TABLET, FILM COATED ORAL ONCE
Refills: 0 | Status: COMPLETED | OUTPATIENT
Start: 2022-07-26 | End: 2022-07-26

## 2022-07-26 RX ORDER — HYDROMORPHONE HYDROCHLORIDE 2 MG/ML
0.5 INJECTION INTRAMUSCULAR; INTRAVENOUS; SUBCUTANEOUS
Refills: 0 | Status: DISCONTINUED | OUTPATIENT
Start: 2022-07-26 | End: 2022-07-26

## 2022-07-26 RX ORDER — OXYCODONE HYDROCHLORIDE 5 MG/1
5 TABLET ORAL ONCE
Refills: 0 | Status: DISCONTINUED | OUTPATIENT
Start: 2022-07-26 | End: 2022-08-09

## 2022-07-26 RX ADMIN — SODIUM CHLORIDE 50 MILLILITER(S): 9 INJECTION, SOLUTION INTRAVENOUS at 11:06

## 2022-07-26 RX ADMIN — ONDANSETRON 4 MILLIGRAM(S): 8 TABLET, FILM COATED ORAL at 13:54

## 2022-07-26 NOTE — ASU PATIENT PROFILE, ADULT - FALL HARM RISK - UNIVERSAL INTERVENTIONS
Bed in lowest position, wheels locked, appropriate side rails in place/Call bell, personal items and telephone in reach/Instruct patient to call for assistance before getting out of bed or chair/Non-slip footwear when patient is out of bed/[place] to call system/Physically safe environment - no spills, clutter or unnecessary equipment/Purposeful Proactive Rounding/Room/bathroom lighting operational, light cord in reach

## 2022-08-22 ENCOUNTER — OUTPATIENT (OUTPATIENT)
Dept: OUTPATIENT SERVICES | Facility: HOSPITAL | Age: 47
LOS: 1 days | End: 2022-08-22
Payer: MEDICARE

## 2022-08-22 ENCOUNTER — RESULT REVIEW (OUTPATIENT)
Age: 47
End: 2022-08-22

## 2022-08-22 ENCOUNTER — APPOINTMENT (OUTPATIENT)
Dept: MAMMOGRAPHY | Facility: IMAGING CENTER | Age: 47
End: 2022-08-22

## 2022-08-22 DIAGNOSIS — Z98.1 ARTHRODESIS STATUS: Chronic | ICD-10-CM

## 2022-08-22 DIAGNOSIS — Z00.8 ENCOUNTER FOR OTHER GENERAL EXAMINATION: ICD-10-CM

## 2022-08-22 DIAGNOSIS — S82.90XA UNSPECIFIED FRACTURE OF UNSPECIFIED LOWER LEG, INITIAL ENCOUNTER FOR CLOSED FRACTURE: Chronic | ICD-10-CM

## 2022-08-22 DIAGNOSIS — D25.9 LEIOMYOMA OF UTERUS, UNSPECIFIED: Chronic | ICD-10-CM

## 2022-08-22 DIAGNOSIS — N84.0 POLYP OF CORPUS UTERI: Chronic | ICD-10-CM

## 2022-08-22 DIAGNOSIS — N60.92 UNSPECIFIED BENIGN MAMMARY DYSPLASIA OF LEFT BREAST: ICD-10-CM

## 2022-08-22 DIAGNOSIS — Z98.890 OTHER SPECIFIED POSTPROCEDURAL STATES: Chronic | ICD-10-CM

## 2022-08-22 DIAGNOSIS — Z98.51 TUBAL LIGATION STATUS: Chronic | ICD-10-CM

## 2022-08-22 PROBLEM — N64.89 OTHER SPECIFIED DISORDERS OF BREAST: Chronic | Status: ACTIVE | Noted: 2022-07-12

## 2022-08-22 PROCEDURE — G0279: CPT

## 2022-08-22 PROCEDURE — 76642 ULTRASOUND BREAST LIMITED: CPT

## 2022-08-22 PROCEDURE — 76642 ULTRASOUND BREAST LIMITED: CPT | Mod: 26,LT

## 2022-08-22 PROCEDURE — G0279: CPT | Mod: 26

## 2022-08-22 PROCEDURE — 77065 DX MAMMO INCL CAD UNI: CPT

## 2022-08-22 PROCEDURE — 77065 DX MAMMO INCL CAD UNI: CPT | Mod: 26,LT

## 2022-08-23 ENCOUNTER — TRANSCRIPTION ENCOUNTER (OUTPATIENT)
Age: 47
End: 2022-08-23

## 2022-08-23 ENCOUNTER — NON-APPOINTMENT (OUTPATIENT)
Age: 47
End: 2022-08-23

## 2022-08-24 ENCOUNTER — APPOINTMENT (OUTPATIENT)
Dept: NEUROLOGY | Facility: CLINIC | Age: 47
End: 2022-08-24

## 2022-08-24 PROCEDURE — 99443: CPT

## 2022-09-05 NOTE — REASON FOR VISIT
[Other: _____] : [unfilled] [FreeTextEntry2] : History of left breast atypia, breast cancer risk score = 31.4

## 2022-09-05 NOTE — PHYSICAL EXAM
[Normal] : supple, no neck mass and thyroid not enlarged [Normal Neck Lymph Nodes] : normal neck lymph nodes  [Normal Supraclavicular Lymph Nodes] : normal supraclavicular lymph nodes [Normal Axillary Lymph Nodes] : normal axillary lymph nodes [Normal] : normal appearance, no rash, nodules, vesicles, ulcers, erythema [de-identified] : Groins not examined [de-identified] : Below

## 2022-09-05 NOTE — REVIEW OF SYSTEMS
[Negative] : Endocrine [de-identified] : Low back pain [de-identified] : Migraines [FreeTextEntry1] : Increased risk of breast cancer

## 2022-09-05 NOTE — ASSESSMENT
[FreeTextEntry1] : \par \par Her preoperative breast MRI (January 2022): BI-RADS 2.\par \par + Tamoxifen started March 2022.\par SELF D/C'D due to side effects in May 2022\par \par She should have a 6-month postoperative mammogram of the left to reestablish a baseline on the surgical side.\par Prescription verified.\par \par \par \par If asymptomatic, with normal imaging, we will see her yearly, sooner if needed\par \par \par \par 8/22/2022:\par Left mammogram and sonogram at [facility]: BI-RADS 3.\par Study was performed to evaluate equivocal findings on preoperative imaging.\par Current study: "Previously described nodular asymmetries in the central aspect and superior posterior aspect on 11/15/2021 are no longer seen".\par + "Newly seen 9 x 7 mm nodule" in the central slightly inner aspect, middle depth.\par Thought to correspond to a circumscribed hypoechoic nodule in the left breast (9:00) measuring 7 x 7 x 4 mm on ultrasound.\par 6-month follow-up left mammogram and sonogram recommended.\par \par Her annual breast imaging are due in November 2021.\par Prescriptions entered today for a bilateral study in December 2022.\par \par We spoke today.\par I reviewed the above information.\par She understands and agrees\par

## 2022-09-06 ENCOUNTER — OUTPATIENT (OUTPATIENT)
Dept: OUTPATIENT SERVICES | Facility: HOSPITAL | Age: 47
LOS: 1 days | Discharge: HOME | End: 2022-09-06

## 2022-09-06 DIAGNOSIS — Z98.1 ARTHRODESIS STATUS: Chronic | ICD-10-CM

## 2022-09-06 DIAGNOSIS — Z98.890 OTHER SPECIFIED POSTPROCEDURAL STATES: Chronic | ICD-10-CM

## 2022-09-06 DIAGNOSIS — C50.919 MALIGNANT NEOPLASM OF UNSPECIFIED SITE OF UNSPECIFIED FEMALE BREAST: ICD-10-CM

## 2022-09-06 DIAGNOSIS — Z98.51 TUBAL LIGATION STATUS: Chronic | ICD-10-CM

## 2022-09-06 DIAGNOSIS — D25.9 LEIOMYOMA OF UTERUS, UNSPECIFIED: Chronic | ICD-10-CM

## 2022-09-06 DIAGNOSIS — N84.0 POLYP OF CORPUS UTERI: Chronic | ICD-10-CM

## 2022-09-06 DIAGNOSIS — S82.90XA UNSPECIFIED FRACTURE OF UNSPECIFIED LOWER LEG, INITIAL ENCOUNTER FOR CLOSED FRACTURE: Chronic | ICD-10-CM

## 2022-09-07 ENCOUNTER — APPOINTMENT (OUTPATIENT)
Dept: HEMATOLOGY ONCOLOGY | Facility: CLINIC | Age: 47
End: 2022-09-07

## 2022-09-07 VITALS
OXYGEN SATURATION: 99 % | RESPIRATION RATE: 16 BRPM | BODY MASS INDEX: 21.6 KG/M2 | DIASTOLIC BLOOD PRESSURE: 81 MMHG | WEIGHT: 106.92 LBS | TEMPERATURE: 98.1 F | SYSTOLIC BLOOD PRESSURE: 120 MMHG | HEART RATE: 86 BPM

## 2022-09-07 PROCEDURE — 99213 OFFICE O/P EST LOW 20 MIN: CPT

## 2022-09-07 RX ORDER — GABAPENTIN 300 MG/1
300 CAPSULE ORAL
Qty: 30 | Refills: 0 | Status: DISCONTINUED | COMMUNITY
Start: 2022-05-03 | End: 2022-09-07

## 2022-09-07 RX ORDER — TAMOXIFEN CITRATE 20 MG/1
20 TABLET, FILM COATED ORAL DAILY
Qty: 1 | Refills: 1 | Status: DISCONTINUED | COMMUNITY
Start: 2022-03-14 | End: 2022-09-07

## 2022-09-07 RX ORDER — PREDNISONE 10 MG/1
10 TABLET ORAL
Qty: 45 | Refills: 0 | Status: DISCONTINUED | COMMUNITY
Start: 2020-02-28 | End: 2022-09-07

## 2022-09-11 ENCOUNTER — TRANSCRIPTION ENCOUNTER (OUTPATIENT)
Age: 47
End: 2022-09-11

## 2022-09-13 NOTE — PHYSICAL EXAM
Consultation note    Referring physician: Kalpesh Brown MD    Consulting Physician: Malick Mane MD    Reason for consultation: Abdominal pain, anemia    Chief Complaint   Patient presents with   • Abdominal Pain   • Black or Bloody Stool       HPI:   The patient is a very pleasant 33 y.o. years old male that presented to the hospital with abdominal pain.  The patient was admitted to the hospital for observation during the week and because of right upper quadrant and periumbilical abdominal pain.  He was treated conservatively and he was eventually discharged home with instructions to follow-up if pain recur.  For the past 24 hours he reports having dark melanotic stools.  The pain has been getting worse.  He took 6 ibuprofen at home but denies chronic NSAID use.  Hemoccult has been positive.  CT scan abdomen pelvis during the weekend showed possible thickened appendix.  He was sent home on Augmentin.  He denies any fevers or chills.      History reviewed. No pertinent past medical history.    History reviewed. No pertinent surgical history.      Current Facility-Administered Medications:   •  pantoprazole (PROTONIX) 40 mg in 100 mL NS (VTB), 8 mg/hr, Intravenous, Continuous, Clayton Johnson II, MD, Last Rate: 20 mL/hr at 09/13/22 1433, 8 mg/hr at 09/13/22 1433  •  sodium chloride 0.9 % flush 10 mL, 10 mL, Intravenous, PRN, Clayton Johnson II, MD  •  sodium chloride 0.9 % infusion, 100 mL/hr, Intravenous, Continuous, Kalpesh Brown MD, Last Rate: 100 mL/hr at 09/13/22 1428, 100 mL/hr at 09/13/22 1428    Current Outpatient Medications:   •  amoxicillin-clavulanate (AUGMENTIN) 875-125 MG per tablet, Take 1 tablet by mouth Every 12 (Twelve) Hours for 14 doses. Indications: Infection Within the Abdomen, Disp: 14 tablet, Rfl: 0  •  levoFLOXacin (LEVAQUIN) 500 MG tablet, Take 500 mg by mouth Daily. Ends 9/16/22, Disp: , Rfl:   •  metroNIDAZOLE (FLAGYL) 500 MG tablet, Take 500 mg by mouth 3 (Three) Times a  Day. Ends 22, Disp: , Rfl:   •  ondansetron ODT (Zofran ODT) 4 MG disintegrating tablet, Place 1 tablet on the tongue Every 8 (Eight) Hours As Needed for Nausea or Vomiting for up to 5 days., Disp: 15 tablet, Rfl: 0    Allergies   Allergen Reactions   • Benadryl [Diphenhydramine Hcl (Sleep)]        Social History     Socioeconomic History   • Marital status: Single   Tobacco Use   • Smoking status: Former Smoker     Types: Electronic Cigarette     Quit date: 2017     Years since quittin.0   • Smokeless tobacco: Never Used   Substance and Sexual Activity   • Alcohol use: Yes     Comment: socially       History reviewed. No pertinent family history.    ROS:   Constitutional: denies any weight changes, fatigue or weakness.  Eyes: : denies blurred or double vision  Cardiovascular: denies chest pain, palpitations, edemas.  Respiratory: denies cough, sputum, SOB.  Gastrointestinal: Reports abdominal pain and melanotic stools  Genitourinary: denies dysuria, frequency.  Endocrine: denies cold intolerance, lethargy and flushing.  Hem: denies excessive bruising and postop bleeding.  Musculoskeletal: denies weakness, joint swelling, pain or stiffness.  Neuro: denies seizures, CVA, paresthesia, or peripheral neuropathy.   Skin: denies change in nevi, rashes, masses.    Physical Exam:   Vitals:    22 1030 22 1031 22 1201 22 1320   BP:  117/74 119/73    BP Location:       Patient Position:       Pulse: 78 79 71 91   Resp:       Temp:       TempSrc:       SpO2: 100% 100% 100% 99%   Weight:       Height:         GENERAL:alert, well appearing, and in no distress and oriented to person, place, and time  HEENT: normochephalic, atraumatic, no scleral icterus moist mucous membranes.  NECK: Supple there is no thyromegaly or lymphadenopathy  CHEST: clear to auscultation, no wheezes, rales or rhonchi, symmetric air entry  CARDIAC: regular rate and rhythm   ABDOMEN: soft, tenderness noticed over the  right periumbilical area and right upper quadrant, nondistended, no masses or organomegaly.  No rebound or guarding no peritoneal signs  EXTREMITIES: no cyanosis, clubbing or edema    NEURO: alert and oriented, normal speech, cranial nerves 2-12 grossly intact, no focal deficits   SKIN: Moist, warm, no rashes.    Diagnostic workup:   CT scan abdomen pelvis show possible thickening of the antropyloric region of the stomach.  The appendix with slight wall thickening and mildly distended after 8 mm, 1 mm larger than 3 days before.  There is no periappendiceal fluid or signs of inflammation.    White blood cell count normal at 6.3, hemoglobin 10.6  Platelets 260  Iron 41, iron saturation 12 and both are low.  CMP within normal limits    Assessment and plan:   The patient is a very pleasant 33 y.o. years old male with abdominal pain and abnormal CT scan of the stomach as well as the appendix with melena.  He now has anemia and there is a change from 2 days before.  He has reported melanotic stools.  Discussed with the patient about further step.  I think he may have gastritis or gastric ulcer that may have been causing the pain and the bleeding.  His pain is located over the periumbilical area and right upper quadrant and less dominant in the right lower quadrant  where the appendix is located.  I recommend that he undergoes upper endoscopy for evaluation of gastric ulcer.  In the meantime we will start him on PPI drip.  If no abnormality in the upper endoscopy then I would recommend that he undergoes laparoscopic appendectomy.  Risk and benefits of procedure including bleeding, infection, perforation discussed in detail with the patient that verbalized understanding and agree with the plan    Case was discussed with Kalpesh Brown MD and patient.    Risk and benefits of the current recommended treatment were explained to the patient that had time to ask questions that where answered, verbalized understanding and agreed  with the plan.     Malick Mane MD  General, Minimally Invasive and Endoscopic Surgery  [facility]    [address], [address]  [place], [place], [address]  P: [phone]  F: [phone]    [Fully active, able to carry on all pre-disease performance without restriction] : Status 0 - Fully active, able to carry on all pre-disease performance without restriction [Normal] : affect appropriate [de-identified] : healed from recent surgery

## 2022-09-26 ENCOUNTER — APPOINTMENT (OUTPATIENT)
Dept: NEUROLOGY | Facility: CLINIC | Age: 47
End: 2022-09-26

## 2022-09-26 VITALS
DIASTOLIC BLOOD PRESSURE: 67 MMHG | WEIGHT: 105 LBS | SYSTOLIC BLOOD PRESSURE: 102 MMHG | BODY MASS INDEX: 21.17 KG/M2 | HEART RATE: 86 BPM | HEIGHT: 59 IN

## 2022-09-26 PROCEDURE — 64615 CHEMODENERV MUSC MIGRAINE: CPT

## 2022-09-26 PROCEDURE — 99212 OFFICE O/P EST SF 10 MIN: CPT | Mod: 25

## 2022-10-27 NOTE — RESULTS/DATA
[FreeTextEntry1] : Laboratory data, radiology and pathology reviewed in detail at the time of visit.\par  1. Can try to decrease frequency of standing Haldol to 5mg PO/IV q12h standing.  F/u daily EKG for QTc<500.  C/w VPA, monitor LFTs, amylase/lipase.    2. PRN: Zyprexa 5mg PO/IM q6h PRN agitation.  **Do not give IM Zyprexa within 3 hours of IM/IV Ativan**    3. CL psych will f/u.

## 2022-10-27 NOTE — ASSESSMENT
[FreeTextEntry1] : Ms. HERNAN ABREU is a 46 year old premenopausal female who underwent left breast bx and was found to have a focus of atypical ductal hyperplasia 2021. Excision 2022 didn’t show additional pathologic findings, specifically in -situ carcinoma or invasive lesion. She has h/o previous benign biopsy. Her mother  from breast cancer. Tamoxifen started 3/2022\par \par ADH\par \par She restarted tamoxifen 2022 .\par She stopped taking tamoxifen x 3 weeks ago \par She felt cloudy in her head, hot flashes \par s/p second opinion at [facility] high risk clinic ( seen by NP) - discussed low dose tamoxifen and contrast mammo/sono. She discussed imaging with Dr Ogden 2022 mammo BIRADS 3  Newly seen nodule in the central slightly inner left breast:6 month f/u imaging to assess for stablilty\par genetics- invitae negative 2022\par She is unable to tolerate full dose, doesn’t want unprotected either due to FH and personal history of ADH. Discussed low dose escalante based on small study ( n=500) published in J[employer]. https://www.ncbi.nlm.nih.gov/pmc/articles/FQL7426774/. \par We discussed it is not standard of care but can be tired in this patient due to intolerable s/e to full dose tamoxifen. Pt was in agreement \par Patient is currently is on low does tamoxifen 10 mg and reports hot flashes have improved and are tolerable. Nausea and fogginess has improved with lower dose.  \par GYN 2022: No vaginal discharge or bleeding noted, reminded to schedule US\par \par - Patient is aware of signs and symptoms of DVT/PE. Patient will report if she develops acute onset chest pain shortness of breath, leg swelling or calf pain. \par  \par \par  Patient did not want BW today as she had it done in [place] 22 revision breast surgery.\par \par RTO 3 M \par \par Dr. Linda Turcios  [facility] [phone]\par

## 2022-10-27 NOTE — PHYSICAL EXAM
[Fully active, able to carry on all pre-disease performance without restriction] : Status 0 - Fully active, able to carry on all pre-disease performance without restriction [Normal] : affect appropriate [de-identified] : Well healed left lumpectomy scar, mild tenderness to left upper breast at 12-1:00 s/p surgical procedure,  right breast WNL

## 2022-10-27 NOTE — HISTORY OF PRESENT ILLNESS
[de-identified] : Ms. HERNAN ABREU is a 46 year old female here for an evaluation of breast cancer prevention. Her oncologic history is as follows:\par \par 11/15/2021:\par Bilateral mammogram and sonogram at [place]: BI-RADS 0.\par Diagnostic imaging of the left breast recommended.\par 2021:\par Diagnostic left mammogram and sonogram at [place]: BI-RADS 4.\par \par 2021: left breast bx\par intraductal papilloma with focal ADH\par \par 2022:\par Bilateral breast MRI at [facility]: BI-RADS 2.\par The area of enhancement extends 1.4 cm anterior to the clip, and 1.0 cm medial to the clip\par \par 2022, she had excision of atypia from the upper left breast, with preoperative localization.\par Oncoplastic closure: Dr. ALONSO Gupta.\par Surgical pathology:\par No residual atypia.\par + Intraductal papillomata.\par + Fibrocystic changes, with usual duct hyperplasia, and apocrine metaplasia.\par \par + FH:\par Mother: Breast cancer 64.\par She did not have genetic testing.\par No other relatives with breast cancer.\par \par Menarche at 8.\par  3, para 3, first at 16.\par LMP: >1-year ago, but not postmenopausal according to hormone measurements through her gynecologist.\par ***In fact, she uses transdermal HRT. She understands that this should cease.\par GYN: Dr. Justice JOHN.\par 2021 visit was unremarkable.\par \par \par + Migraines.\par Neurology: Dr. Allegra PEGUERO.\par Treated with Aimovig.\par She also receives Botox injections.\par \par  she had lumbar fusion surgery at [facility].\par Orthopedics: Dr. Willam MORGAN.\par \par \par \par Colonoscopy:\par At age 45 was normal, she does not recall the physician's name\par \par She has severe migraine, takes botox every 3 m, use meds PRN\par Insomnia and anxiety\par Psoriasis, on stelera every 3 m. 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 She isn't having periods x 2 yrs but estrogen level in premenopausal range. She is s/p progesterone treatment but no periods yet. She will f/u with gyn\par She took tamoxifen x 2 days. Took at night. Woke up with nausea in am . No vomiting. She had poor appetite. She stopped tamoxifen after 2 days and sx resolved. \par Since she is premenopausal, tamoxifen is the only option. She does have anxiety and takes xanax PRN and Ambien HS. Rec to see psychiatrist re anxiety\par Try tamoxifen after breakfast or lunch, try zofran PRN\par genetic testing pending\par  [de-identified] : Ms. HERNAN ABREU is a 46 year old premenopausal female who underwent left breast bx and was found to have a focus of atypical ductal hyperplasia 2021. Excision 2022 didn’t show additional pathologic findings, specifically in -situ carcinoma or invasive lesion. She has h/o previous benign biopsy. Her mother  from breast cancer. Tamoxifen started 3/2022. Stopped after 2 days due to nausea. \par \par She restarted tamoxifen 2022 .\par She stopped taking tamoxifen x 3 weeks ago \par She felt cloudy in her head, hot flashes \par s/p second opinion at [facility] high risk clinic ( seen by NP) - discussed low dose tamoxifen and contrast mammo/sono. She discussed imaging with Dr Ogden\par genetics- invitae negative 2022\par She is unable to tolerate full dose, doesn’t want unprotected either due to FH and personal history of ADH. Discussed low dose escalante based on small study ( n=500) published in J[employer]. https://www.ncbi.nlm.nih.gov/pmc/articles/HBS8828652/. \par We discussed it is not standard of care but can be tired in this patient due to intolerable s/e to full dose tamoxifen. Pt was in agreement \par Patient is currently is on low does tamoxifen 10 mg hot flashes have decreased and are tolerable. Nausea and fogginess has improved with lower dose..  She reports weight loss but weight stable.\par No vaginal discharge or bleeding noted\par She saw GYN 2022 reminded to schedule US\par SHe has 3 children 29 yo girl, 16 yo boy and 10 yo boy \par 2022 mammo BIRADS 3  Newly seen nodule in the central slightly inner left breast for which 6 month left diagnostic mammography/targeted sono rec.\par \par

## 2022-10-31 ENCOUNTER — APPOINTMENT (OUTPATIENT)
Dept: ORTHOPEDIC SURGERY | Facility: CLINIC | Age: 47
End: 2022-10-31

## 2022-10-31 VITALS
HEART RATE: 86 BPM | OXYGEN SATURATION: 98 % | HEIGHT: 59 IN | WEIGHT: 105 LBS | BODY MASS INDEX: 21.17 KG/M2 | TEMPERATURE: 97.3 F

## 2022-10-31 DIAGNOSIS — M43.16 SPONDYLOLISTHESIS, LUMBAR REGION: ICD-10-CM

## 2022-10-31 PROCEDURE — 99214 OFFICE O/P EST MOD 30 MIN: CPT

## 2022-10-31 PROCEDURE — 72040 X-RAY EXAM NECK SPINE 2-3 VW: CPT

## 2022-10-31 PROCEDURE — 72100 X-RAY EXAM L-S SPINE 2/3 VWS: CPT

## 2022-11-07 ENCOUNTER — NON-APPOINTMENT (OUTPATIENT)
Age: 47
End: 2022-11-07

## 2022-11-30 ENCOUNTER — TRANSCRIPTION ENCOUNTER (OUTPATIENT)
Age: 47
End: 2022-11-30

## 2022-12-05 ENCOUNTER — APPOINTMENT (OUTPATIENT)
Dept: UROLOGY | Facility: CLINIC | Age: 47
End: 2022-12-05

## 2022-12-05 VITALS
SYSTOLIC BLOOD PRESSURE: 127 MMHG | OXYGEN SATURATION: 98 % | HEART RATE: 76 BPM | TEMPERATURE: 97.3 F | RESPIRATION RATE: 16 BRPM | DIASTOLIC BLOOD PRESSURE: 84 MMHG

## 2022-12-05 PROCEDURE — 99204 OFFICE O/P NEW MOD 45 MIN: CPT

## 2022-12-05 RX ORDER — BETAMETHASONE DIPROPIONATE 0.5 MG/G
0.05 LOTION TOPICAL
Qty: 60 | Refills: 0 | Status: ACTIVE | COMMUNITY
Start: 2022-10-31

## 2022-12-05 RX ORDER — PREDNISONE 50 MG/1
TABLET ORAL
Refills: 0 | Status: COMPLETED | COMMUNITY
End: 2022-12-05

## 2022-12-05 RX ORDER — FLUTICASONE PROPIONATE 50 UG/1
50 SPRAY, METERED NASAL
Qty: 16 | Refills: 0 | Status: ACTIVE | COMMUNITY
Start: 2022-11-22

## 2022-12-05 RX ORDER — DIAZEPAM 5 MG/1
5 TABLET ORAL
Qty: 3 | Refills: 0 | Status: COMPLETED | COMMUNITY
Start: 2022-01-03 | End: 2022-12-05

## 2022-12-05 RX ORDER — FLUCONAZOLE 150 MG/1
150 TABLET ORAL
Qty: 1 | Refills: 0 | Status: COMPLETED | COMMUNITY
Start: 2022-09-11 | End: 2022-12-05

## 2022-12-05 RX ORDER — MEDROXYPROGESTERONE ACETATE 10 MG/1
10 TABLET ORAL
Qty: 5 | Refills: 0 | Status: COMPLETED | COMMUNITY
Start: 2022-03-31 | End: 2022-12-05

## 2022-12-05 RX ORDER — METHYLPREDNISOLONE 4 MG/1
4 TABLET ORAL
Qty: 2 | Refills: 1 | Status: COMPLETED | COMMUNITY
Start: 2022-10-31 | End: 2022-12-05

## 2022-12-05 RX ORDER — HYDROCORTISONE 1 %
1 CREAM (GRAM) TOPICAL
Refills: 0 | Status: COMPLETED | COMMUNITY
End: 2022-12-05

## 2022-12-06 ENCOUNTER — TRANSCRIPTION ENCOUNTER (OUTPATIENT)
Age: 47
End: 2022-12-06

## 2022-12-06 ENCOUNTER — OUTPATIENT (OUTPATIENT)
Dept: OUTPATIENT SERVICES | Facility: HOSPITAL | Age: 47
LOS: 1 days | End: 2022-12-06
Payer: MEDICARE

## 2022-12-06 ENCOUNTER — APPOINTMENT (OUTPATIENT)
Dept: OBGYN | Facility: CLINIC | Age: 47
End: 2022-12-06

## 2022-12-06 ENCOUNTER — APPOINTMENT (OUTPATIENT)
Dept: ULTRASOUND IMAGING | Facility: CLINIC | Age: 47
End: 2022-12-06

## 2022-12-06 VITALS — WEIGHT: 105 LBS | DIASTOLIC BLOOD PRESSURE: 70 MMHG | SYSTOLIC BLOOD PRESSURE: 115 MMHG | BODY MASS INDEX: 21.21 KG/M2

## 2022-12-06 DIAGNOSIS — S82.90XA UNSPECIFIED FRACTURE OF UNSPECIFIED LOWER LEG, INITIAL ENCOUNTER FOR CLOSED FRACTURE: Chronic | ICD-10-CM

## 2022-12-06 DIAGNOSIS — N39.0 URINARY TRACT INFECTION, SITE NOT SPECIFIED: ICD-10-CM

## 2022-12-06 DIAGNOSIS — Z98.890 OTHER SPECIFIED POSTPROCEDURAL STATES: Chronic | ICD-10-CM

## 2022-12-06 DIAGNOSIS — N84.0 POLYP OF CORPUS UTERI: Chronic | ICD-10-CM

## 2022-12-06 DIAGNOSIS — D25.9 LEIOMYOMA OF UTERUS, UNSPECIFIED: Chronic | ICD-10-CM

## 2022-12-06 DIAGNOSIS — Z98.1 ARTHRODESIS STATUS: Chronic | ICD-10-CM

## 2022-12-06 DIAGNOSIS — Z98.51 TUBAL LIGATION STATUS: Chronic | ICD-10-CM

## 2022-12-06 PROCEDURE — 76770 US EXAM ABDO BACK WALL COMP: CPT | Mod: 26

## 2022-12-06 PROCEDURE — 76770 US EXAM ABDO BACK WALL COMP: CPT

## 2022-12-06 PROCEDURE — 99213 OFFICE O/P EST LOW 20 MIN: CPT

## 2022-12-06 NOTE — HISTORY OF PRESENT ILLNESS
[FreeTextEntry1] : patient is here for suprapubic pain\par she was seen yesterday by urologist for recurrent UTI \par she was started on antibiotic and pyridium

## 2022-12-06 NOTE — PLAN
[FreeTextEntry1] : continue with treatment as per Urologist\par if no improvement in 2 days contact urologist

## 2022-12-08 ENCOUNTER — APPOINTMENT (OUTPATIENT)
Dept: OBGYN | Facility: CLINIC | Age: 47
End: 2022-12-08

## 2022-12-08 ENCOUNTER — TRANSCRIPTION ENCOUNTER (OUTPATIENT)
Age: 47
End: 2022-12-08

## 2022-12-08 ENCOUNTER — NON-APPOINTMENT (OUTPATIENT)
Age: 47
End: 2022-12-08

## 2022-12-08 VITALS — SYSTOLIC BLOOD PRESSURE: 117 MMHG | BODY MASS INDEX: 20.8 KG/M2 | DIASTOLIC BLOOD PRESSURE: 83 MMHG | WEIGHT: 103 LBS

## 2022-12-08 PROCEDURE — 99213 OFFICE O/P EST LOW 20 MIN: CPT

## 2022-12-08 RX ORDER — ETODOLAC 500 MG/1
500 TABLET, FILM COATED, EXTENDED RELEASE ORAL DAILY
Qty: 1 | Refills: 3 | Status: ACTIVE | COMMUNITY
Start: 2022-12-08 | End: 1900-01-01

## 2022-12-08 NOTE — HISTORY OF PRESENT ILLNESS
[FreeTextEntry1] : pt comes for pelvic pan . She was treated for uti , cultures negative and was told to stop meds and wanted to be seen by me . She has no weight loss , hematuria , flank pains bloating , hematuria or rectal bleeding . Sexually active .

## 2022-12-08 NOTE — PHYSICAL EXAM
[Labia Majora] : normal [Labia Minora] : normal [Normal] : normal [Tenderness] : nontender [Uterine Adnexae] : normal [FreeTextEntry4] : anterior wall tenderness [FreeTextEntry6] : irregular uterus non tender .

## 2022-12-09 ENCOUNTER — APPOINTMENT (OUTPATIENT)
Dept: ORTHOPEDIC SURGERY | Facility: CLINIC | Age: 47
End: 2022-12-09

## 2022-12-09 DIAGNOSIS — M50.20 OTHER CERVICAL DISC DISPLACEMENT, UNSPECIFIED CERVICAL REGION: ICD-10-CM

## 2022-12-09 PROCEDURE — 99214 OFFICE O/P EST MOD 30 MIN: CPT | Mod: 95

## 2022-12-12 NOTE — PHYSICAL EXAM
[General Appearance - Well Developed] : well developed [General Appearance - In No Acute Distress] : no acute distress [Edema] : no peripheral edema [Exaggerated Use Of Accessory Muscles For Inspiration] : no accessory muscle use [Abdomen Soft] : soft [Abdomen Tenderness] : non-tender [Normal Station and Gait] : the gait and station were normal for the patient's age [Oriented To Time, Place, And Person] : oriented to person, place, and time

## 2022-12-12 NOTE — ASSESSMENT
[FreeTextEntry1] : Suspect undertreatment rather than true recurrent infection.\par --UA, UCx\par --Empiric tx with cefdinir\par --begin methenamine after completion with Vit C\par

## 2022-12-12 NOTE — HISTORY OF PRESENT ILLNESS
[FreeTextEntry1] : 47 year old  female who presents as a new patient with abdominal pain and feeling uncomfortable. Went to PCP 10/11/22, UA was positive and patient was given Cipro. Patient reports she got some relief but now feels worse. Patient reports dysuria, abdominal and back pain, urinary frequency, urgency, stress incontinence, retention, and occasional nausea. Patient denies any hematuria. Patient had a fever and chills 2 weeks ago but had a sinus infection at the time (was experiencing the same urinary symptoms also). She reports she had 2 UTIs in the last year, treated with Bactrim (earlier this year) and Cipro. She denies any recent imaging.  Had kidney stones once 30 years ago, no intervention was able to pass. \par \par review of UCx shows morganella morganii (R-bactrim, macrobid, amox, amp, keflex). \par \par Family hx: mom had frequent UTIs and kidney stones \par \par Day time- q1 hour\par Nocturia- 3-4x \par Social hx: Nonsmoker. \par No coffee or tea use \par Drinks 2 bottles of water a day \par \par \par daily bowel movements with medium consistency

## 2022-12-14 ENCOUNTER — APPOINTMENT (OUTPATIENT)
Dept: UROLOGY | Facility: CLINIC | Age: 47
End: 2022-12-14

## 2022-12-14 ENCOUNTER — OUTPATIENT (OUTPATIENT)
Dept: OUTPATIENT SERVICES | Facility: HOSPITAL | Age: 47
LOS: 1 days | End: 2022-12-14
Payer: MEDICARE

## 2022-12-14 VITALS — SYSTOLIC BLOOD PRESSURE: 108 MMHG | HEART RATE: 81 BPM | DIASTOLIC BLOOD PRESSURE: 72 MMHG | RESPIRATION RATE: 16 BRPM

## 2022-12-14 DIAGNOSIS — R35.0 FREQUENCY OF MICTURITION: ICD-10-CM

## 2022-12-14 DIAGNOSIS — Z98.890 OTHER SPECIFIED POSTPROCEDURAL STATES: Chronic | ICD-10-CM

## 2022-12-14 DIAGNOSIS — N84.0 POLYP OF CORPUS UTERI: Chronic | ICD-10-CM

## 2022-12-14 DIAGNOSIS — D25.9 LEIOMYOMA OF UTERUS, UNSPECIFIED: Chronic | ICD-10-CM

## 2022-12-14 DIAGNOSIS — Z98.1 ARTHRODESIS STATUS: Chronic | ICD-10-CM

## 2022-12-14 DIAGNOSIS — S82.90XA UNSPECIFIED FRACTURE OF UNSPECIFIED LOWER LEG, INITIAL ENCOUNTER FOR CLOSED FRACTURE: Chronic | ICD-10-CM

## 2022-12-14 DIAGNOSIS — Z98.51 TUBAL LIGATION STATUS: Chronic | ICD-10-CM

## 2022-12-14 LAB
APPEARANCE: CLEAR
BACTERIA UR CULT: NORMAL
BACTERIA: ABNORMAL
BILIRUBIN URINE: NEGATIVE
BLOOD URINE: NEGATIVE
COLOR: NORMAL
GLUCOSE QUALITATIVE U: NEGATIVE
HYALINE CASTS: 3 /LPF
KETONES URINE: NEGATIVE
LEUKOCYTE ESTERASE URINE: ABNORMAL
MICROSCOPIC-UA: NORMAL
NITRITE URINE: NEGATIVE
PH URINE: 5.5
PROTEIN URINE: NEGATIVE
RED BLOOD CELLS URINE: 5 /HPF
SPECIFIC GRAVITY URINE: 1.02
SQUAMOUS EPITHELIAL CELLS: 6 /HPF
UROBILINOGEN URINE: NORMAL
WHITE BLOOD CELLS URINE: 3 /HPF

## 2022-12-14 PROCEDURE — 51700 IRRIGATION OF BLADDER: CPT

## 2022-12-16 ENCOUNTER — OUTPATIENT (OUTPATIENT)
Dept: OUTPATIENT SERVICES | Facility: HOSPITAL | Age: 47
LOS: 1 days | Discharge: HOME | End: 2022-12-16

## 2022-12-16 DIAGNOSIS — Z98.890 OTHER SPECIFIED POSTPROCEDURAL STATES: Chronic | ICD-10-CM

## 2022-12-16 DIAGNOSIS — S82.90XA UNSPECIFIED FRACTURE OF UNSPECIFIED LOWER LEG, INITIAL ENCOUNTER FOR CLOSED FRACTURE: Chronic | ICD-10-CM

## 2022-12-16 DIAGNOSIS — Z98.1 ARTHRODESIS STATUS: Chronic | ICD-10-CM

## 2022-12-16 DIAGNOSIS — D25.9 LEIOMYOMA OF UTERUS, UNSPECIFIED: Chronic | ICD-10-CM

## 2022-12-16 DIAGNOSIS — N84.0 POLYP OF CORPUS UTERI: Chronic | ICD-10-CM

## 2022-12-16 DIAGNOSIS — Z98.51 TUBAL LIGATION STATUS: Chronic | ICD-10-CM

## 2022-12-16 DIAGNOSIS — C50.919 MALIGNANT NEOPLASM OF UNSPECIFIED SITE OF UNSPECIFIED FEMALE BREAST: ICD-10-CM

## 2022-12-19 ENCOUNTER — APPOINTMENT (OUTPATIENT)
Age: 47
End: 2022-12-19

## 2022-12-21 ENCOUNTER — APPOINTMENT (OUTPATIENT)
Dept: UROLOGY | Facility: CLINIC | Age: 47
End: 2022-12-21

## 2022-12-21 ENCOUNTER — APPOINTMENT (OUTPATIENT)
Dept: HEMATOLOGY ONCOLOGY | Facility: CLINIC | Age: 47
End: 2022-12-21

## 2022-12-21 ENCOUNTER — RESULT REVIEW (OUTPATIENT)
Age: 47
End: 2022-12-21

## 2022-12-21 ENCOUNTER — OUTPATIENT (OUTPATIENT)
Dept: OUTPATIENT SERVICES | Facility: HOSPITAL | Age: 47
LOS: 1 days | End: 2022-12-21
Payer: MEDICARE

## 2022-12-21 VITALS
OXYGEN SATURATION: 98 % | DIASTOLIC BLOOD PRESSURE: 76 MMHG | TEMPERATURE: 97.4 F | BODY MASS INDEX: 20.93 KG/M2 | WEIGHT: 103.62 LBS | RESPIRATION RATE: 18 BRPM | SYSTOLIC BLOOD PRESSURE: 114 MMHG | HEART RATE: 94 BPM

## 2022-12-21 VITALS — HEART RATE: 84 BPM | DIASTOLIC BLOOD PRESSURE: 78 MMHG | SYSTOLIC BLOOD PRESSURE: 109 MMHG | RESPIRATION RATE: 17 BRPM

## 2022-12-21 DIAGNOSIS — Z98.51 TUBAL LIGATION STATUS: Chronic | ICD-10-CM

## 2022-12-21 DIAGNOSIS — Z98.890 OTHER SPECIFIED POSTPROCEDURAL STATES: Chronic | ICD-10-CM

## 2022-12-21 DIAGNOSIS — Z98.1 ARTHRODESIS STATUS: Chronic | ICD-10-CM

## 2022-12-21 DIAGNOSIS — N84.0 POLYP OF CORPUS UTERI: Chronic | ICD-10-CM

## 2022-12-21 DIAGNOSIS — R35.0 FREQUENCY OF MICTURITION: ICD-10-CM

## 2022-12-21 DIAGNOSIS — S82.90XA UNSPECIFIED FRACTURE OF UNSPECIFIED LOWER LEG, INITIAL ENCOUNTER FOR CLOSED FRACTURE: Chronic | ICD-10-CM

## 2022-12-21 DIAGNOSIS — R10.2 PELVIC AND PERINEAL PAIN: ICD-10-CM

## 2022-12-21 DIAGNOSIS — D25.9 LEIOMYOMA OF UTERUS, UNSPECIFIED: Chronic | ICD-10-CM

## 2022-12-21 LAB
BASOPHILS # BLD AUTO: 0.02 K/UL — SIGNIFICANT CHANGE UP (ref 0–0.2)
BASOPHILS NFR BLD AUTO: 0.4 % — SIGNIFICANT CHANGE UP (ref 0–2)
EOSINOPHIL # BLD AUTO: 0.1 K/UL — SIGNIFICANT CHANGE UP (ref 0–0.5)
EOSINOPHIL NFR BLD AUTO: 1.9 % — SIGNIFICANT CHANGE UP (ref 0–6)
HCT VFR BLD CALC: 35 % — SIGNIFICANT CHANGE UP (ref 34.5–45)
HGB BLD-MCNC: 11.6 G/DL — SIGNIFICANT CHANGE UP (ref 11.5–15.5)
IMM GRANULOCYTES NFR BLD AUTO: 0.2 % — SIGNIFICANT CHANGE UP (ref 0–0.9)
LYMPHOCYTES # BLD AUTO: 1.97 K/UL — SIGNIFICANT CHANGE UP (ref 1–3.3)
LYMPHOCYTES # BLD AUTO: 38 % — SIGNIFICANT CHANGE UP (ref 13–44)
MCHC RBC-ENTMCNC: 29.9 PG — SIGNIFICANT CHANGE UP (ref 27–34)
MCHC RBC-ENTMCNC: 33.1 G/DL — SIGNIFICANT CHANGE UP (ref 32–36)
MCV RBC AUTO: 90.2 FL — SIGNIFICANT CHANGE UP (ref 80–100)
MONOCYTES # BLD AUTO: 0.33 K/UL — SIGNIFICANT CHANGE UP (ref 0–0.9)
MONOCYTES NFR BLD AUTO: 6.4 % — SIGNIFICANT CHANGE UP (ref 2–14)
NEUTROPHILS # BLD AUTO: 2.76 K/UL — SIGNIFICANT CHANGE UP (ref 1.8–7.4)
NEUTROPHILS NFR BLD AUTO: 53.1 % — SIGNIFICANT CHANGE UP (ref 43–77)
NRBC # BLD: 0 /100 WBCS — SIGNIFICANT CHANGE UP (ref 0–0)
PLATELET # BLD AUTO: 173 K/UL — SIGNIFICANT CHANGE UP (ref 150–400)
RBC # BLD: 3.88 M/UL — SIGNIFICANT CHANGE UP (ref 3.8–5.2)
RBC # FLD: 12.6 % — SIGNIFICANT CHANGE UP (ref 10.3–14.5)
WBC # BLD: 5.19 K/UL — SIGNIFICANT CHANGE UP (ref 3.8–10.5)
WBC # FLD AUTO: 5.19 K/UL — SIGNIFICANT CHANGE UP (ref 3.8–10.5)

## 2022-12-21 PROCEDURE — 51700 IRRIGATION OF BLADDER: CPT

## 2022-12-21 PROCEDURE — 99214 OFFICE O/P EST MOD 30 MIN: CPT

## 2022-12-21 NOTE — ASSESSMENT
[FreeTextEntry1] : Ms. HERNAN ABREU is a 46 year old premenopausal female who underwent left breast bx and was found to have a focus of atypical ductal hyperplasia 2021. Excision 2022 didn’t show additional pathologic findings, specifically in -situ carcinoma or invasive lesion. She has h/o previous benign biopsy. Her mother  from breast cancer. Tamoxifen started 3/2022 and switched to escalante 10 mg daily from 2022 due to s/e\par \par ADH: \par Pt is on Tamoxifen 10 mg daily and doesn’t have s/e. FElt cloudy and sever hot flashes with Escalante 20\par She is tolerating tamoxifen with expected side effects. Reports good compliance. Plan to continue tamoxifen for 5 years. Will switch to AI after menopause. GYN and opthal f/u annually due to risk of endometrial ca and cataracts respectively.  Up to date with Breast imaging\par - Rec to add ASA due to recent dx of high chol\par -  Mild hot flashes: 2/2 tamoxifen.  Advised to wear layers and use fan prn. Consider Effexor if get worse.\par - Patient is aware of signs and symptoms of DVT/PE. Patient will report if she develops acute onset chest pain shortness of breath, leg swelling or calf pain. \par RTC 6 m. \par \par

## 2022-12-21 NOTE — PHYSICAL EXAM
[Fully active, able to carry on all pre-disease performance without restriction] : Status 0 - Fully active, able to carry on all pre-disease performance without restriction [Normal] : affect appropriate [de-identified] : Well healed left lumpectomy scar, mild tenderness to left upper breast at 12-1:00 s/p surgical procedure,  right breast WNL

## 2022-12-21 NOTE — HISTORY OF PRESENT ILLNESS
[de-identified] : Ms. HERNAN ABREU is a 46 year old female here for an evaluation of breast cancer prevention. Her oncologic history is as follows:\par \par 11/15/2021:\par Bilateral mammogram and sonogram at [place]: BI-RADS 0.\par Diagnostic imaging of the left breast recommended.\par 2021:\par Diagnostic left mammogram and sonogram at [place]: BI-RADS 4.\par \par 2021: left breast bx\par intraductal papilloma with focal ADH\par \par 2022:\par Bilateral breast MRI at [facility]: BI-RADS 2.\par The area of enhancement extends 1.4 cm anterior to the clip, and 1.0 cm medial to the clip\par \par 2022, she had excision of atypia from the upper left breast, with preoperative localization.\par Oncoplastic closure: Dr. ALONSO Gupta.\par Surgical pathology:\par No residual atypia.\par + Intraductal papillomata.\par + Fibrocystic changes, with usual duct hyperplasia, and apocrine metaplasia.\par \par + FH:\par Mother: Breast cancer 64.\par She did not have genetic testing.\par No other relatives with breast cancer.\par \par Menarche at 8.\par  3, para 3, first at 16.\par LMP: >1-year ago, but not postmenopausal according to hormone measurements through her gynecologist.\par ***In fact, she uses transdermal HRT. She understands that this should cease.\par GYN: Dr. Justice JOHN.\par 2021 visit was unremarkable.\par \par \par + Migraines.\par Neurology: Dr. Allegra PEGUERO.\par Treated with Aimovig.\par She also receives Botox injections.\par \par  she had lumbar fusion surgery at [facility].\par Orthopedics: Dr. Willam MORGAN.\par \par \par \par Colonoscopy:\par At age 45 was normal, she does not recall the physician's name\par \par She has severe migraine, takes botox every 3 m, use meds PRN\par Insomnia and anxiety\par Psoriasis, on stelera every 3 m. 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 She isn't having periods x 2 yrs but estrogen level in premenopausal range. She is s/p progesterone treatment but no periods yet. She will f/u with gyn\par She took tamoxifen x 2 days. Took at night. Woke up with nausea in am . No vomiting. She had poor appetite. She stopped tamoxifen after 2 days and sx resolved. \par Since she is premenopausal, tamoxifen is the only option. She does have anxiety and takes xanax PRN and Ambien HS. Rec to see psychiatrist re anxiety\par Try tamoxifen after breakfast or lunch, try zofran PRN\par genetic testing pending\par \par \par 2022\par She restarted tamoxifen 2022 .\par She stopped taking tamoxifen x 3 weeks ago \par She felt cloudy in her head, hot flashes \par s/p second opinion at [facility] high risk clinic ( seen by NP) - discussed low dose tamoxifen and contrast mammo/sono. She discussed imaging with Dr Ogden\par genetics- invitae negative 2022\par She is unable to tolerate full dose, doesn’t want unprotected either due to FH and personal history of ADH. Discussed low dose escalante based on small study ( n=500) published in [employer]. https://www.ncbi.nlm.nih.gov/pmc/articles/JYD5315352/. \par We discussed it is not standard of care but can be tired in this patient due to intolerable s/e to full dose tamoxifen. Pt was in agreement \par \par 2022\par Patient is currently is on low does tamoxifen 10 mg hot flashes have decreased and are tolerable. Nausea and fogginess has improved with lower dose..  She reports weight loss but weight stable.\par No vaginal discharge or bleeding noted\par She saw GYN 2022 reminded to schedule US\par SHe has 3 children 31 yo girl, 16 yo boy and 10 yo boy \par 2022 mammo BIRADS 3  Newly seen nodule in the central slightly inner left breast for which 6 month left diagnostic mammography/targeted sono rec.\par \par  [de-identified] : Ms. HERNAN ABREU is a 46 year old premenopausal female who underwent left breast bx and was found to have a focus of atypical ductal hyperplasia 2021. Excision 2022 didn’t show additional pathologic findings, specifically in -situ carcinoma or invasive lesion. She has h/o previous benign biopsy. Her mother  from breast cancer. Tamoxifen started 3/2022 and switched to escalante 10 mg daily from 2022 due to s/e\par \par 2022\par Pt is on Tamoxifen 10 mg daily and doesn’t have s/e. FElt cloudy and sever hot flashes with Escalante 20\par She is tolerating tamoxifen well with tolerable hot flashes. Good compliance. She denies mood changes, wt gain, vaginal dryness, SOB, chest pain, leg swelling or clotting issues. \par LMP date: ?\par GYN - seeing annually \par Breast imaging: [facility] for tomorrow\par Opthal: seeing annually\par SHe in concerned about wt loss. Wt is same in the chart. She is seeing GI and had colonoscopy.\par Her chol is high , Rec to d/w cardio to start statin \par

## 2022-12-22 ENCOUNTER — OUTPATIENT (OUTPATIENT)
Dept: OUTPATIENT SERVICES | Facility: HOSPITAL | Age: 47
LOS: 1 days | End: 2022-12-22
Payer: MEDICARE

## 2022-12-22 ENCOUNTER — APPOINTMENT (OUTPATIENT)
Dept: MAMMOGRAPHY | Facility: IMAGING CENTER | Age: 47
End: 2022-12-22

## 2022-12-22 ENCOUNTER — RESULT REVIEW (OUTPATIENT)
Age: 47
End: 2022-12-22

## 2022-12-22 ENCOUNTER — APPOINTMENT (OUTPATIENT)
Dept: ULTRASOUND IMAGING | Facility: IMAGING CENTER | Age: 47
End: 2022-12-22

## 2022-12-22 DIAGNOSIS — Z98.890 OTHER SPECIFIED POSTPROCEDURAL STATES: Chronic | ICD-10-CM

## 2022-12-22 DIAGNOSIS — S82.90XA UNSPECIFIED FRACTURE OF UNSPECIFIED LOWER LEG, INITIAL ENCOUNTER FOR CLOSED FRACTURE: Chronic | ICD-10-CM

## 2022-12-22 DIAGNOSIS — Z98.51 TUBAL LIGATION STATUS: Chronic | ICD-10-CM

## 2022-12-22 DIAGNOSIS — Z00.8 ENCOUNTER FOR OTHER GENERAL EXAMINATION: ICD-10-CM

## 2022-12-22 DIAGNOSIS — N60.92 UNSPECIFIED BENIGN MAMMARY DYSPLASIA OF LEFT BREAST: ICD-10-CM

## 2022-12-22 DIAGNOSIS — D25.9 LEIOMYOMA OF UTERUS, UNSPECIFIED: Chronic | ICD-10-CM

## 2022-12-22 DIAGNOSIS — N30.10 INTERSTITIAL CYSTITIS (CHRONIC) WITHOUT HEMATURIA: ICD-10-CM

## 2022-12-22 DIAGNOSIS — N84.0 POLYP OF CORPUS UTERI: Chronic | ICD-10-CM

## 2022-12-22 DIAGNOSIS — Z98.1 ARTHRODESIS STATUS: Chronic | ICD-10-CM

## 2022-12-22 LAB
ALBUMIN SERPL ELPH-MCNC: 4.4 G/DL
ALP BLD-CCNC: 78 U/L
ALT SERPL-CCNC: 20 U/L
ANION GAP SERPL CALC-SCNC: 9 MMOL/L
AST SERPL-CCNC: 19 U/L
BILIRUB SERPL-MCNC: 0.2 MG/DL
BUN SERPL-MCNC: 12 MG/DL
CALCIUM SERPL-MCNC: 9.2 MG/DL
CHLORIDE SERPL-SCNC: 105 MMOL/L
CO2 SERPL-SCNC: 26 MMOL/L
CREAT SERPL-MCNC: 0.57 MG/DL
EGFR: 112 ML/MIN/1.73M2
GLUCOSE SERPL-MCNC: 92 MG/DL
POTASSIUM SERPL-SCNC: 4.1 MMOL/L
PROT SERPL-MCNC: 6.7 G/DL
SODIUM SERPL-SCNC: 139 MMOL/L

## 2022-12-22 PROCEDURE — 77066 DX MAMMO INCL CAD BI: CPT

## 2022-12-22 PROCEDURE — G0279: CPT

## 2022-12-22 PROCEDURE — 77066 DX MAMMO INCL CAD BI: CPT | Mod: 26

## 2022-12-22 PROCEDURE — 76641 ULTRASOUND BREAST COMPLETE: CPT | Mod: 26,50

## 2022-12-22 PROCEDURE — G0279: CPT | Mod: 26

## 2022-12-22 PROCEDURE — 76641 ULTRASOUND BREAST COMPLETE: CPT

## 2023-01-06 ENCOUNTER — APPOINTMENT (OUTPATIENT)
Dept: SURGICAL ONCOLOGY | Facility: CLINIC | Age: 48
End: 2023-01-06
Payer: MEDICARE

## 2023-01-08 NOTE — ASSESSMENT
[FreeTextEntry1] : 1/6/2022: She could not keep her appointment for follow-up  interval breast examination, history of left breast atypia, breast cancer risk score = 31.4\par \par Her preoperative breast MRI (January 2022): BI-RADS 2.\par \par + Tamoxifen started March 2022.\par SELF D/C'D due to side effects in May 2022\par \par \par \par 12/22/22:\par Bilateral mammogram and sonogram at [facility]: BI-RADS 2.\par Prescription is entered for December 2023.\par \par I also Submitted a prescription for a June 2023 breast MRI, Breast cancer risk score = 31.4\par \par If she remains asymptomatic, with normal imaging, we should see her in another year, sooner if needed\par

## 2023-01-08 NOTE — HISTORY OF PRESENT ILLNESS
[de-identified] : [place]\par \par \par 47 year-old lady.\par \par \par 2022:\par Excision of atypia from her left breast (upper).\par Oncoplastic closure: Dr. ALONSO SNELL.\par Surgical pathology:\par Intraductal papilloma, fibrocystic change with duct hyperplasia and apocrine metaplasia.\par \par He is planning fat grafting because she has a little indentation from the lumpectomy.\par \par \par 2022:\par Preoperative breast MRI at [facility]: BI-RADS 2.\par \par \par Postoperatively she met with medical oncology (Dr. Aura CORDERO).\par + Tamoxifen, started 2022.\par SELF-D/D'd May 2022 secondary to side effects.\par \par Dr. Cordero is aware.\par Patient is not a candidate for other therapies (such as aromatase inhibitors) since she is currently premenopausal.\par We will revisit this topic after her menopause.....................\par \par \par 2022:\par She was referred by her gynecologist (Dr. Justice JOHN) with  atypical ductal hyperplasia (focal ADH), AND an intraductal papilloma of the LEFT BREAST (12-1:00).\par \par This was an area of focal asymmetry identified on screening bilateral breast imaging, and subsequent diagnostic studies (below).\par \par Diagnosis was established on stereotactic biopsy performed at [place].\par \par No signs or symptoms related to either breast.\par \par No previous breast biopsies.\par \par \par + FH:\par Mother: Breast cancer 64.\par She did not have genetic testing.\par No other relatives with breast cancer.\par \par A paternal aunt had ovarian cancer.\par \par Not Ashkenazi.\par \par \par Our patient had genetic testing at [facility] through medical oncology in : NO deleterious mutations\par \par \par No other family history of malignancy.\par \par \par Menarche at 8.\par  3, para 3, first at 16.\par LMP: >1-year ago, but not postmenopausal according to hormone measurements through her gynecologist.\par ***In fact, she uses transdermal HRT.\par She understands that this should cease.\par \par \par Breast cancer risk score =31.4%\par \par \par 11/15/2021:\par Bilateral mammogram and sonogram at [place]: BI-RADS 0.\par Diagnostic imaging of the left breast recommended.\par 2021:\par Diagnostic left mammogram and sonogram at [place]: BI-RADS 4.\par \par \par \par PMD: \par Dr Amy WINKLER.\par \par No pacemaker or defibrillator.\par \par No anticoagulants.\par \par + Migraines.\par Neurology: Dr. Allegra PEGUERO.\par Treated with Aimovig.\par She also receives Botox injections.\par \par + Low back pain (LBP).\par + LLE sciatica.\par  she had lumbar fusion surgery at [facility].\par Orthopedics: Dr. Willam MORGAN.\par \par \par GYN: Dr. Justice JOHN.\par 2021 visit was unremarkable.\par \par \par Colonoscopy:\par At age 45 was normal, she does not recall the physician's name\par

## 2023-01-08 NOTE — PHYSICAL EXAM
[Normal] : supple, no neck mass and thyroid not enlarged [Normal Neck Lymph Nodes] : normal neck lymph nodes  [Normal Supraclavicular Lymph Nodes] : normal supraclavicular lymph nodes [Normal Axillary Lymph Nodes] : normal axillary lymph nodes [Normal] : normal appearance, no rash, nodules, vesicles, ulcers, erythema [de-identified] : Groins not examined [de-identified] : Below

## 2023-01-08 NOTE — REASON FOR VISIT
[Other: _____] : [unfilled] [FreeTextEntry2] : 1/6/2022: She could not keep her appointment for follow-up  interval breast examination, history of left breast atypia, breast cancer risk score = 31.4

## 2023-01-08 NOTE — REVIEW OF SYSTEMS
[Negative] : Endocrine [de-identified] : Low back pain [de-identified] : Migraines [FreeTextEntry1] : Increased risk of breast cancer

## 2023-01-09 ENCOUNTER — APPOINTMENT (OUTPATIENT)
Dept: SURGICAL ONCOLOGY | Facility: CLINIC | Age: 48
End: 2023-01-09
Payer: MEDICARE

## 2023-01-10 ENCOUNTER — NON-APPOINTMENT (OUTPATIENT)
Age: 48
End: 2023-01-10

## 2023-01-11 ENCOUNTER — APPOINTMENT (OUTPATIENT)
Dept: UROLOGY | Facility: CLINIC | Age: 48
End: 2023-01-11
Payer: MEDICARE

## 2023-01-11 ENCOUNTER — OUTPATIENT (OUTPATIENT)
Dept: OUTPATIENT SERVICES | Facility: HOSPITAL | Age: 48
LOS: 1 days | End: 2023-01-11
Payer: MEDICARE

## 2023-01-11 DIAGNOSIS — R39.9 UNSPECIFIED SYMPTOMS AND SIGNS INVOLVING THE GENITOURINARY SYSTEM: ICD-10-CM

## 2023-01-11 DIAGNOSIS — D25.9 LEIOMYOMA OF UTERUS, UNSPECIFIED: Chronic | ICD-10-CM

## 2023-01-11 DIAGNOSIS — Z98.51 TUBAL LIGATION STATUS: Chronic | ICD-10-CM

## 2023-01-11 DIAGNOSIS — Z98.890 OTHER SPECIFIED POSTPROCEDURAL STATES: Chronic | ICD-10-CM

## 2023-01-11 DIAGNOSIS — S82.90XA UNSPECIFIED FRACTURE OF UNSPECIFIED LOWER LEG, INITIAL ENCOUNTER FOR CLOSED FRACTURE: Chronic | ICD-10-CM

## 2023-01-11 DIAGNOSIS — R35.0 FREQUENCY OF MICTURITION: ICD-10-CM

## 2023-01-11 DIAGNOSIS — N84.0 POLYP OF CORPUS UTERI: Chronic | ICD-10-CM

## 2023-01-11 DIAGNOSIS — Z98.1 ARTHRODESIS STATUS: Chronic | ICD-10-CM

## 2023-01-11 PROCEDURE — 51700 IRRIGATION OF BLADDER: CPT

## 2023-01-11 PROCEDURE — 99214 OFFICE O/P EST MOD 30 MIN: CPT | Mod: 25

## 2023-01-11 NOTE — ASSESSMENT
[FreeTextEntry1] : Suspect undertreatment rather than true recurrent infection. Now with negative cultures but persistent bother consistent with CPPS picture. \par --F/u UCx\par --Cont methenamine after completion with Vit C\par --cysto for eval\par --refer to Sonja/Shabbir\par --BLI today\par

## 2023-01-11 NOTE — PHYSICAL EXAM
[General Appearance - Well Developed] : well developed [General Appearance - In No Acute Distress] : no acute distress [Abdomen Soft] : soft [Abdomen Tenderness] : non-tender [Edema] : no peripheral edema [Exaggerated Use Of Accessory Muscles For Inspiration] : no accessory muscle use [Oriented To Time, Place, And Person] : oriented to person, place, and time [Normal Station and Gait] : the gait and station were normal for the patient's age

## 2023-01-11 NOTE — HISTORY OF PRESENT ILLNESS
[FreeTextEntry1] : 47 year old  female with cc of recurrent UTI and UTI sx. Went to PCP 10/11/22 for vague abd pain and discomfort.  UA was positive and patient was given Cipro. Patient reports she got some relief but then again felt worse. Patient reports dysuria, abdominal and back pain, urinary frequency, urgency, stress incontinence, retention, and occasional nausea. She reports she had 2 UTIs in the last year, treated with Bactrim (earlier this year) and Cipro. review of UCx shows morganella morganii (R-bactrim, macrobid, amox, amp, keflex). Pt suspected to have ? undertreatment and was started on methenamine. She has continued however to hav bother. \par \par Urinary sx include Day time- q1 hour. Nocturia- 3-4x. No coffee or tea use. Drinks 2 bottles of water a day. daily bowel movements with medium consistency. Pt with bladder pain issues. She has most pain when her bladder is full. Urine was negative. She was given instillations prior to her cruise vacation. SHe had benefit with the instillations for a couple of days and then develops bother again. Some relief with pyridium. She does note some stress incontinence as well. She has had more bother. US with many epis. No real pyuria. She does have crystals. Had renal US in Dec that showed B stones. \par \par Had kidney stones once 30 years ago, no intervention was able to pass. \par \par Family hx: mom had frequent UTIs and kidney stones \par

## 2023-01-16 LAB
APPEARANCE: CLEAR
BACTERIA UR CULT: ABNORMAL
BACTERIA: ABNORMAL
BILIRUBIN URINE: NEGATIVE
BLOOD URINE: NEGATIVE
CALCIUM OXALATE CRYSTALS: ABNORMAL
COLOR: ABNORMAL
GLUCOSE QUALITATIVE U: NEGATIVE
HYALINE CASTS: 0 /LPF
KETONES URINE: NEGATIVE
LEUKOCYTE ESTERASE URINE: NEGATIVE
MICROSCOPIC-UA: NORMAL
NITRITE URINE: NEGATIVE
PH URINE: 5.5
PROTEIN URINE: NORMAL
RED BLOOD CELLS URINE: 0 /HPF
SPECIFIC GRAVITY URINE: 1.02
SQUAMOUS EPITHELIAL CELLS: 10 /HPF
UROBILINOGEN URINE: NORMAL
WHITE BLOOD CELLS URINE: 4 /HPF

## 2023-01-18 ENCOUNTER — APPOINTMENT (OUTPATIENT)
Dept: UROLOGY | Facility: CLINIC | Age: 48
End: 2023-01-18
Payer: MEDICARE

## 2023-01-18 DIAGNOSIS — R39.9 UNSPECIFIED SYMPTOMS AND SIGNS INVOLVING THE GENITOURINARY SYSTEM: ICD-10-CM

## 2023-01-18 PROCEDURE — 99215 OFFICE O/P EST HI 40 MIN: CPT

## 2023-01-20 ENCOUNTER — OUTPATIENT (OUTPATIENT)
Dept: OUTPATIENT SERVICES | Facility: HOSPITAL | Age: 48
LOS: 1 days | End: 2023-01-20
Payer: MEDICARE

## 2023-01-20 ENCOUNTER — APPOINTMENT (OUTPATIENT)
Dept: UROLOGY | Facility: CLINIC | Age: 48
End: 2023-01-20
Payer: MEDICARE

## 2023-01-20 ENCOUNTER — NON-APPOINTMENT (OUTPATIENT)
Age: 48
End: 2023-01-20

## 2023-01-20 VITALS — DIASTOLIC BLOOD PRESSURE: 85 MMHG | SYSTOLIC BLOOD PRESSURE: 122 MMHG | HEART RATE: 83 BPM | TEMPERATURE: 98 F

## 2023-01-20 DIAGNOSIS — N84.0 POLYP OF CORPUS UTERI: Chronic | ICD-10-CM

## 2023-01-20 DIAGNOSIS — S82.90XA UNSPECIFIED FRACTURE OF UNSPECIFIED LOWER LEG, INITIAL ENCOUNTER FOR CLOSED FRACTURE: Chronic | ICD-10-CM

## 2023-01-20 DIAGNOSIS — Z98.51 TUBAL LIGATION STATUS: Chronic | ICD-10-CM

## 2023-01-20 DIAGNOSIS — R35.0 FREQUENCY OF MICTURITION: ICD-10-CM

## 2023-01-20 DIAGNOSIS — Z98.890 OTHER SPECIFIED POSTPROCEDURAL STATES: Chronic | ICD-10-CM

## 2023-01-20 DIAGNOSIS — Z98.1 ARTHRODESIS STATUS: Chronic | ICD-10-CM

## 2023-01-20 DIAGNOSIS — T14.8XXA OTHER INJURY OF UNSPECIFIED BODY REGION, INITIAL ENCOUNTER: ICD-10-CM

## 2023-01-20 DIAGNOSIS — D25.9 LEIOMYOMA OF UTERUS, UNSPECIFIED: Chronic | ICD-10-CM

## 2023-01-20 DIAGNOSIS — N39.0 URINARY TRACT INFECTION, SITE NOT SPECIFIED: ICD-10-CM

## 2023-01-20 PROCEDURE — 51700 IRRIGATION OF BLADDER: CPT

## 2023-01-23 ENCOUNTER — APPOINTMENT (OUTPATIENT)
Dept: NEUROLOGY | Facility: CLINIC | Age: 48
End: 2023-01-23
Payer: MEDICARE

## 2023-01-23 VITALS
SYSTOLIC BLOOD PRESSURE: 117 MMHG | DIASTOLIC BLOOD PRESSURE: 68 MMHG | BODY MASS INDEX: 20.96 KG/M2 | HEART RATE: 86 BPM | WEIGHT: 104 LBS | HEIGHT: 59 IN

## 2023-01-23 PROCEDURE — 64615 CHEMODENERV MUSC MIGRAINE: CPT

## 2023-01-23 PROCEDURE — 99212 OFFICE O/P EST SF 10 MIN: CPT | Mod: 25

## 2023-01-23 NOTE — HISTORY OF PRESENT ILLNESS
[FreeTextEntry1] : \par 47-year-old woman who is here for follow-up of her chronic migraine.  Since June patient has been doing well on the Aimovig however over the past month patient has recurrence of her migraine despite the Aimovig.  Patient has had no medication changes or infection that may account for her migraine exacerbation.\par \par interval history: she will be going to [facility] to visit her son for a month\par \par Interval history: Due to some complications she will is not able to go to [facility] to visit her son.  Patient was a last minute add-on as she had new onset of right-sided headaches that are pounding in nature.  This is different from her previous headaches.  She was agreeable to occipital nerve block.\par \par interval history: she is here for botox injections\par \par interval history: she has some migraines about 2 since last month.\par \par Interval history: Since her last visit patient had a breast biopsy of papilloma breast cancer.  Patient is upset about the scar on her breast and patient is debating whether she should start tamoxifen.  Patient was advised to follow-up with her breast oncologist for further discussion of treatment options.\par \par interval history: she is more accepting of her breast cancer diagnosis. She discussed her son needing a ped neurologist. info was given to her for peds neuro office.\par \par interval history: she is doing well.

## 2023-01-25 ENCOUNTER — APPOINTMENT (OUTPATIENT)
Dept: ORTHOPEDIC SURGERY | Facility: CLINIC | Age: 48
End: 2023-01-25

## 2023-01-25 DIAGNOSIS — N39.0 URINARY TRACT INFECTION, SITE NOT SPECIFIED: ICD-10-CM

## 2023-01-25 DIAGNOSIS — T14.8XXA OTHER INJURY OF UNSPECIFIED BODY REGION, INITIAL ENCOUNTER: ICD-10-CM

## 2023-01-26 ENCOUNTER — APPOINTMENT (OUTPATIENT)
Dept: UROLOGY | Facility: CLINIC | Age: 48
End: 2023-01-26
Payer: MEDICARE

## 2023-01-26 ENCOUNTER — OUTPATIENT (OUTPATIENT)
Dept: OUTPATIENT SERVICES | Facility: HOSPITAL | Age: 48
LOS: 1 days | End: 2023-01-26
Payer: MEDICARE

## 2023-01-26 ENCOUNTER — TRANSCRIPTION ENCOUNTER (OUTPATIENT)
Age: 48
End: 2023-01-26

## 2023-01-26 VITALS
SYSTOLIC BLOOD PRESSURE: 116 MMHG | HEIGHT: 59 IN | HEART RATE: 97 BPM | WEIGHT: 104 LBS | BODY MASS INDEX: 20.96 KG/M2 | DIASTOLIC BLOOD PRESSURE: 77 MMHG | RESPIRATION RATE: 17 BRPM

## 2023-01-26 DIAGNOSIS — R35.0 FREQUENCY OF MICTURITION: ICD-10-CM

## 2023-01-26 DIAGNOSIS — N30.10 INTERSTITIAL CYSTITIS (CHRONIC) WITHOUT HEMATURIA: ICD-10-CM

## 2023-01-26 PROCEDURE — 51700 IRRIGATION OF BLADDER: CPT

## 2023-02-02 ENCOUNTER — OUTPATIENT (OUTPATIENT)
Dept: OUTPATIENT SERVICES | Facility: HOSPITAL | Age: 48
LOS: 1 days | End: 2023-02-02
Payer: MEDICARE

## 2023-02-02 ENCOUNTER — APPOINTMENT (OUTPATIENT)
Dept: UROLOGY | Facility: CLINIC | Age: 48
End: 2023-02-02
Payer: MEDICARE

## 2023-02-02 VITALS
SYSTOLIC BLOOD PRESSURE: 117 MMHG | DIASTOLIC BLOOD PRESSURE: 81 MMHG | WEIGHT: 104 LBS | BODY MASS INDEX: 20.96 KG/M2 | RESPIRATION RATE: 17 BRPM | HEIGHT: 59 IN | HEART RATE: 96 BPM

## 2023-02-02 VITALS
HEIGHT: 59 IN | HEART RATE: 96 BPM | WEIGHT: 104 LBS | DIASTOLIC BLOOD PRESSURE: 79 MMHG | RESPIRATION RATE: 17 BRPM | BODY MASS INDEX: 20.96 KG/M2 | SYSTOLIC BLOOD PRESSURE: 116 MMHG

## 2023-02-02 DIAGNOSIS — Z98.1 ARTHRODESIS STATUS: Chronic | ICD-10-CM

## 2023-02-02 DIAGNOSIS — R35.0 FREQUENCY OF MICTURITION: ICD-10-CM

## 2023-02-02 DIAGNOSIS — Z98.51 TUBAL LIGATION STATUS: Chronic | ICD-10-CM

## 2023-02-02 DIAGNOSIS — Z98.890 OTHER SPECIFIED POSTPROCEDURAL STATES: Chronic | ICD-10-CM

## 2023-02-02 DIAGNOSIS — S82.90XA UNSPECIFIED FRACTURE OF UNSPECIFIED LOWER LEG, INITIAL ENCOUNTER FOR CLOSED FRACTURE: Chronic | ICD-10-CM

## 2023-02-02 DIAGNOSIS — D25.9 LEIOMYOMA OF UTERUS, UNSPECIFIED: Chronic | ICD-10-CM

## 2023-02-02 DIAGNOSIS — N84.0 POLYP OF CORPUS UTERI: Chronic | ICD-10-CM

## 2023-02-02 PROCEDURE — 51700 IRRIGATION OF BLADDER: CPT

## 2023-02-06 DIAGNOSIS — R10.2 PELVIC AND PERINEAL PAIN: ICD-10-CM

## 2023-02-06 DIAGNOSIS — R39.89 OTHER SYMPTOMS AND SIGNS INVOLVING THE GENITOURINARY SYSTEM: ICD-10-CM

## 2023-02-07 ENCOUNTER — OUTPATIENT (OUTPATIENT)
Dept: OUTPATIENT SERVICES | Facility: HOSPITAL | Age: 48
LOS: 1 days | End: 2023-02-07

## 2023-02-07 ENCOUNTER — APPOINTMENT (OUTPATIENT)
Dept: PLASTIC SURGERY | Facility: CLINIC | Age: 48
End: 2023-02-07

## 2023-02-07 VITALS — HEART RATE: 82 BPM | SYSTOLIC BLOOD PRESSURE: 114 MMHG | RESPIRATION RATE: 14 BRPM | DIASTOLIC BLOOD PRESSURE: 80 MMHG

## 2023-02-08 DIAGNOSIS — Z01.89 ENCOUNTER FOR OTHER SPECIFIED SPECIAL EXAMINATIONS: ICD-10-CM

## 2023-02-10 ENCOUNTER — RX CHANGE (OUTPATIENT)
Age: 48
End: 2023-02-10

## 2023-02-10 ENCOUNTER — APPOINTMENT (OUTPATIENT)
Dept: UROLOGY | Facility: CLINIC | Age: 48
End: 2023-02-10

## 2023-02-11 NOTE — HISTORY OF PRESENT ILLNESS
[de-identified] : She had [place] breast [place]\par \par \par 47 year-old lady.\par \par \par 2022:\par Excision of atypia from her left breast (upper).\par Oncoplastic closure: Dr. ALONSO SNELL.\par Surgical pathology:\par Intraductal papilloma, fibrocystic change with duct hyperplasia and apocrine metaplasia.\par \par He is planning fat grafting because she has a little indentation from the lumpectomy.\par \par \par 2022:\par Preoperative breast MRI at [facility]: BI-RADS 2.\par \par \par Postoperatively she met with medical oncology (Dr. Aura CORDERO).\par + Tamoxifen, started 2022.\par SELF-D/D'd May 2022 secondary to side effects.\par \par Dr. Cordero is aware.\par Patient is not a candidate for other therapies (such as aromatase inhibitors) since she is currently premenopausal.\par We will revisit this topic after her menopause.....................\par \par \par 2022:\par She was referred by her gynecologist (Dr. Justice JOHN) with  atypical ductal hyperplasia (focal ADH), AND an intraductal papilloma of the LEFT BREAST (12-1:00).\par \par This was an area of focal asymmetry identified on screening bilateral breast imaging, and subsequent diagnostic studies (below).\par \par Diagnosis was established on stereotactic biopsy performed at [place].\par \par No signs or symptoms related to either breast.\par \par No previous breast biopsies.\par \par \par + FH:\par Mother: Breast cancer 64.\par She did not have genetic testing.\par No other relatives with breast cancer.\par \par A paternal aunt had ovarian cancer.\par \par Not Ashkenazi.\par \par \par Our patient had genetic testing at [facility] through medical oncology in : NO deleterious mutations\par \par \par No other family history of malignancy.\par \par \par Menarche at 8.\par  3, para 3, first at 16.\par LMP: >1-year ago, but not postmenopausal according to hormone measurements through her gynecologist.\par ***In fact, she uses transdermal HRT.\par She understands that this should cease.\par \par \par Breast cancer risk score =31.4%\par \par \par 11/15/2021:\par Bilateral mammogram and sonogram at [place]: BI-RADS 0.\par Diagnostic imaging of the left breast recommended.\par 2021:\par Diagnostic left mammogram and sonogram at [place]: BI-RADS 4.\par \par \par \par PMD: \par Dr Amy WINKLER.\par \par No pacemaker or defibrillator.\par \par No anticoagulants.\par \par + Migraines.\par Neurology: Dr. Allegra PEGUERO.\par Treated with Aimovig.\par She also receives Botox injections.\par \par + Low back pain (LBP).\par + LLE sciatica.\par  she had lumbar fusion surgery at [facility].\par Orthopedics: Dr. Willma MORGAN.\par \par \par GYN: Dr. Justice JOHN.\par 2021 visit was unremarkable.\par \par \par Colonoscopy:\par At age 45 was normal, she does not recall the physician's name\par

## 2023-02-11 NOTE — REASON FOR VISIT
[Follow-Up Visit] : a follow-up visit for [Other: _____] : [unfilled] [FreeTextEntry2] : 1/9/2023: She did not keep her appointment for follow-up of left breast atypia, breast cancer risk score = 31.4

## 2023-02-11 NOTE — PHYSICAL EXAM
[Normal] : supple, no neck mass and thyroid not enlarged [Normal Neck Lymph Nodes] : normal neck lymph nodes  [Normal Supraclavicular Lymph Nodes] : normal supraclavicular lymph nodes [Normal Axillary Lymph Nodes] : normal axillary lymph nodes [Normal] : normal appearance, no rash, nodules, vesicles, ulcers, erythema [de-identified] : Groins not examined [de-identified] : Below

## 2023-02-11 NOTE — REVIEW OF SYSTEMS
[Negative] : Endocrine [de-identified] : Chronic low back pain [de-identified] : Migraines [FreeTextEntry1] : Increased risk of breast cancer

## 2023-02-11 NOTE — ASSESSMENT
[FreeTextEntry1] : 1/6/2022: She could not keep her appointment for follow-up  interval breast examination, history of left breast atypia, breast cancer risk score = 31.4\par \par 1/9/2023: She did not keep her appointment for follow-up of left breast atypia, breast cancer risk score = 31.4\par \par Her preoperative breast MRI (January 2022): BI-RADS 2.\par \par + Tamoxifen started March 2022.\par SELF D/C'D due to side effects in May 2022\par \par \par \par 12/22/22:\par Bilateral mammogram and sonogram at [facility]: BI-RADS 2.\par Prescription is entered for December 2023.\par \par I also Submitted a prescription for a June 2023 breast MRI, Breast cancer risk score = 31.4\par \par If she remains asymptomatic, with normal imaging, we should see her in another year, sooner if needed\par

## 2023-02-13 ENCOUNTER — APPOINTMENT (OUTPATIENT)
Dept: SURGICAL ONCOLOGY | Facility: CLINIC | Age: 48
End: 2023-02-13
Payer: MEDICARE

## 2023-02-13 ENCOUNTER — RESULT REVIEW (OUTPATIENT)
Age: 48
End: 2023-02-13

## 2023-02-13 ENCOUNTER — APPOINTMENT (OUTPATIENT)
Dept: HEMATOLOGY ONCOLOGY | Facility: CLINIC | Age: 48
End: 2023-02-13

## 2023-02-13 VITALS
RESPIRATION RATE: 16 BRPM | SYSTOLIC BLOOD PRESSURE: 111 MMHG | HEIGHT: 59 IN | BODY MASS INDEX: 20.96 KG/M2 | OXYGEN SATURATION: 98 % | HEART RATE: 85 BPM | DIASTOLIC BLOOD PRESSURE: 74 MMHG | WEIGHT: 104 LBS

## 2023-02-13 DIAGNOSIS — R92.8 OTHER ABNORMAL AND INCONCLUSIVE FINDINGS ON DIAGNOSTIC IMAGING OF BREAST: ICD-10-CM

## 2023-02-13 LAB
BASOPHILS # BLD AUTO: 0.03 K/UL — SIGNIFICANT CHANGE UP (ref 0–0.2)
BASOPHILS NFR BLD AUTO: 0.6 % — SIGNIFICANT CHANGE UP (ref 0–2)
EOSINOPHIL # BLD AUTO: 0.09 K/UL — SIGNIFICANT CHANGE UP (ref 0–0.5)
EOSINOPHIL NFR BLD AUTO: 1.9 % — SIGNIFICANT CHANGE UP (ref 0–6)
HCT VFR BLD CALC: 35.5 % — SIGNIFICANT CHANGE UP (ref 34.5–45)
HGB BLD-MCNC: 11.6 G/DL — SIGNIFICANT CHANGE UP (ref 11.5–15.5)
IMM GRANULOCYTES NFR BLD AUTO: 0.4 % — SIGNIFICANT CHANGE UP (ref 0–0.9)
LYMPHOCYTES # BLD AUTO: 1.54 K/UL — SIGNIFICANT CHANGE UP (ref 1–3.3)
LYMPHOCYTES # BLD AUTO: 32.3 % — SIGNIFICANT CHANGE UP (ref 13–44)
MCHC RBC-ENTMCNC: 29.4 PG — SIGNIFICANT CHANGE UP (ref 27–34)
MCHC RBC-ENTMCNC: 32.7 G/DL — SIGNIFICANT CHANGE UP (ref 32–36)
MCV RBC AUTO: 89.9 FL — SIGNIFICANT CHANGE UP (ref 80–100)
MONOCYTES # BLD AUTO: 0.3 K/UL — SIGNIFICANT CHANGE UP (ref 0–0.9)
MONOCYTES NFR BLD AUTO: 6.3 % — SIGNIFICANT CHANGE UP (ref 2–14)
NEUTROPHILS # BLD AUTO: 2.79 K/UL — SIGNIFICANT CHANGE UP (ref 1.8–7.4)
NEUTROPHILS NFR BLD AUTO: 58.5 % — SIGNIFICANT CHANGE UP (ref 43–77)
NRBC # BLD: 0 /100 WBCS — SIGNIFICANT CHANGE UP (ref 0–0)
PLATELET # BLD AUTO: 188 K/UL — SIGNIFICANT CHANGE UP (ref 150–400)
RBC # BLD: 3.95 M/UL — SIGNIFICANT CHANGE UP (ref 3.8–5.2)
RBC # FLD: 13 % — SIGNIFICANT CHANGE UP (ref 10.3–14.5)
WBC # BLD: 4.77 K/UL — SIGNIFICANT CHANGE UP (ref 3.8–10.5)
WBC # FLD AUTO: 4.77 K/UL — SIGNIFICANT CHANGE UP (ref 3.8–10.5)

## 2023-02-13 PROCEDURE — 99214 OFFICE O/P EST MOD 30 MIN: CPT

## 2023-02-15 ENCOUNTER — APPOINTMENT (OUTPATIENT)
Dept: UROLOGY | Facility: CLINIC | Age: 48
End: 2023-02-15
Payer: MEDICARE

## 2023-02-15 ENCOUNTER — OUTPATIENT (OUTPATIENT)
Dept: OUTPATIENT SERVICES | Facility: HOSPITAL | Age: 48
LOS: 1 days | End: 2023-02-15
Payer: MEDICARE

## 2023-02-15 DIAGNOSIS — Z98.1 ARTHRODESIS STATUS: Chronic | ICD-10-CM

## 2023-02-15 DIAGNOSIS — D25.9 LEIOMYOMA OF UTERUS, UNSPECIFIED: Chronic | ICD-10-CM

## 2023-02-15 DIAGNOSIS — Z98.890 OTHER SPECIFIED POSTPROCEDURAL STATES: Chronic | ICD-10-CM

## 2023-02-15 DIAGNOSIS — S82.90XA UNSPECIFIED FRACTURE OF UNSPECIFIED LOWER LEG, INITIAL ENCOUNTER FOR CLOSED FRACTURE: Chronic | ICD-10-CM

## 2023-02-15 DIAGNOSIS — Z98.51 TUBAL LIGATION STATUS: Chronic | ICD-10-CM

## 2023-02-15 DIAGNOSIS — R35.0 FREQUENCY OF MICTURITION: ICD-10-CM

## 2023-02-15 DIAGNOSIS — N84.0 POLYP OF CORPUS UTERI: Chronic | ICD-10-CM

## 2023-02-15 PROCEDURE — 52000 CYSTOURETHROSCOPY: CPT

## 2023-02-15 PROCEDURE — 51700 IRRIGATION OF BLADDER: CPT | Mod: 59

## 2023-02-17 DIAGNOSIS — N30.10 INTERSTITIAL CYSTITIS (CHRONIC) WITHOUT HEMATURIA: ICD-10-CM

## 2023-02-22 ENCOUNTER — APPOINTMENT (OUTPATIENT)
Dept: UROLOGY | Facility: CLINIC | Age: 48
End: 2023-02-22

## 2023-03-01 ENCOUNTER — APPOINTMENT (OUTPATIENT)
Dept: UROLOGY | Facility: CLINIC | Age: 48
End: 2023-03-01

## 2023-03-02 ENCOUNTER — APPOINTMENT (OUTPATIENT)
Dept: UROLOGY | Facility: CLINIC | Age: 48
End: 2023-03-02
Payer: MEDICARE

## 2023-03-02 ENCOUNTER — OUTPATIENT (OUTPATIENT)
Dept: OUTPATIENT SERVICES | Facility: HOSPITAL | Age: 48
LOS: 1 days | End: 2023-03-02
Payer: MEDICARE

## 2023-03-02 DIAGNOSIS — S82.90XA UNSPECIFIED FRACTURE OF UNSPECIFIED LOWER LEG, INITIAL ENCOUNTER FOR CLOSED FRACTURE: Chronic | ICD-10-CM

## 2023-03-02 DIAGNOSIS — Z98.51 TUBAL LIGATION STATUS: Chronic | ICD-10-CM

## 2023-03-02 DIAGNOSIS — Z98.890 OTHER SPECIFIED POSTPROCEDURAL STATES: Chronic | ICD-10-CM

## 2023-03-02 DIAGNOSIS — R35.0 FREQUENCY OF MICTURITION: ICD-10-CM

## 2023-03-02 PROCEDURE — 51700 IRRIGATION OF BLADDER: CPT

## 2023-03-03 ENCOUNTER — APPOINTMENT (OUTPATIENT)
Dept: NEUROLOGY | Facility: CLINIC | Age: 48
End: 2023-03-03
Payer: MEDICARE

## 2023-03-03 PROCEDURE — 99215 OFFICE O/P EST HI 40 MIN: CPT | Mod: 95

## 2023-03-03 NOTE — DISCUSSION/SUMMARY
[FreeTextEntry1] : 47-year-old woman who is here for follow-up of her chronic migraines for which she is on Botox.  Recently patient stressful event may have triggered a migraine exacerbation.  We will try prednisone taper and side effects were discussed with the patient in detail.  Patient will follow-up with me for Botox injection and reach out to me if her headache continues despite the prednisone taper.\par \par physician location: office\par patient location: home\par \par I spent 40 minutes of total time, during which more than 50% of the time was spent on counseling. I explained the diagnosis, other possible diagnoses, workup, and management, as well as answered any questions.\par \par This is a telemedicine visit that was performed using real time 2-way audiovisual technology. Verbal consent to participate in video visit was obtained and patient was aware of their right to refuse to participate in services delivered via telemedicine and the alternative and potential limitations of participating in a telemedicine visit vs a face-to-face visit; I have also informed the patient of my current location in the [facility] and the names of all persons participating in the telemedicine service and their role in the encounter. The patient agrees to have this service via Telehealth.\par \par  This visit occurred during the Coronavirus (COVID-19) Public Health Emergency. I discussed with the patient the nature of our telemedicine visits, that: \par • I would evaluate the patient and recommend diagnostics and treatments based on my assessment \par • Our sessions are not being recorded and that personal health information is protected \par • Our team would provide follow up care in person if/when the patient needs it.\par

## 2023-03-03 NOTE — HISTORY OF PRESENT ILLNESS
[FreeTextEntry1] : verbal consent given on 03/03/2023 and 14:50  by HERNAN ABREU at [id number]\[place] [address]\par \par 47-year-old woman who is here for follow-up of her chronic migraine.  Since June patient has been doing well on the Aimovig however over the past month patient has recurrence of her migraine despite the Aimovig.  Patient has had no medication changes or infection that may account for her migraine exacerbation.\par \par interval history: she will be going to [facility] to visit her son for a month\par \par Interval history: Due to some complications she will is not able to go to [facility] to visit her son.  Patient was a last minute add-on as she had new onset of right-sided headaches that are pounding in nature.  This is different from her previous headaches.  She was agreeable to occipital nerve block.\par \par interval history: she is here for botox injections\par \par interval history: she has some migraines about 2 since last month.\par \par Interval history: Since her last visit patient had a breast biopsy of papilloma breast cancer.  Patient is upset about the scar on her breast and patient is debating whether she should start tamoxifen.  Patient was advised to follow-up with her breast oncologist for further discussion of treatment options.\par \par interval history: she is more accepting of her breast cancer diagnosis. She discussed her son needing a ped neurologist. info was given to her for peds neuro office.\par \par interval history: she is doing well. \par \par Interval history March 3 2023: Since her last visit she was stranded in [place] when her car broke down.  Patient did not have enough sleep and she was very stressed last week.  She had been having a migraine that is typical of her previous migraine but has not stopped.  Patient also has associated nausea.\par \par CONSENT FOR VIDEO MEDICAL VISIT1. I understand that my physician wishes me to engage in a telemedicine consultation.2. My physician has explained to me how the video conferencing technology will be used to affect such a consultation will not be the same as a direct patient/health care provider visit due to the fact that I will not be in the same room as my physician 3. I understand there are potential risks to this technology, including interruptions, unauthorized access and technical difficulties. I understand that my health care provider or I can discontinue the telemedicine consult/visit if it is felt that the videoconferencing connections are not adequate for the situation.4. I understand that my healthcare information may be shared with other individuals for scheduling and billing purposes. Others may also be present during the consultation other than my physician and consulting health care provider in order to operate the video equipment. The above mentioned people will all maintain confidentiality of the information obtained. I further understand that I will be informed of their presence in the consultation and thus will have the right to request the following: (1) omit specific details of my medical history/physical examination that are personally sensitive to me; (2) ask non-medical personnel to leave the telemedicine examination room: and or (3) terminate the consultation at any time.5. I have had the alternatives to a telemedicine consultation explained to me, and in choosing to participate in a telemedicine consultation. I understand that some parts of the exam involving physical tests may be conducted by individuals at my location at the direction of the consulting health care provider.6. In an emergent consultation, I understand that the responsibility of the telemedicine consulting specialist is to advise my local practitioner and that the specialist’s responsibility will conclude upon the termination of the video conference connection.7. I understand that billing will occur from both my physician and as a facility fee from the site from which I am presented.8. I have had a direct conversation with my physician, during which I had the opportunity to ask questions in regard to this procedure. My questions have been answered and the risks, benefits and any practical alternatives have been discussed with me in a language in which I understand\par

## 2023-03-03 NOTE — PHYSICAL EXAM
[General Appearance - Alert] : alert [Oriented To Time, Place, And Person] : oriented to person, place, and time [Person] : oriented to person [Place] : oriented to place [Time] : oriented to time [Short Term Intact] : short term memory intact [Fluency] : fluency intact [Current Events] : adequate knowledge of current events [Cranial Nerves Oculomotor (III)] : extraocular motion intact [Cranial Nerves Facial (VII)] : face symmetrical [Cranial Nerves Vestibulocochlear (VIII)] : hearing was intact bilaterally [Abnormal Walk] : normal gait

## 2023-03-07 DIAGNOSIS — N30.10 INTERSTITIAL CYSTITIS (CHRONIC) WITHOUT HEMATURIA: ICD-10-CM

## 2023-03-08 RX ORDER — NORTRIPTYLINE HYDROCHLORIDE 10 MG/1
10 CAPSULE ORAL
Qty: 270 | Refills: 3 | Status: DISCONTINUED | COMMUNITY
Start: 2023-01-18 | End: 2023-03-08

## 2023-03-08 RX ORDER — PREDNISONE 10 MG/1
10 TABLET ORAL
Qty: 21 | Refills: 0 | Status: DISCONTINUED | COMMUNITY
Start: 2023-03-03 | End: 2023-03-08

## 2023-03-17 ENCOUNTER — APPOINTMENT (OUTPATIENT)
Dept: UROLOGY | Facility: CLINIC | Age: 48
End: 2023-03-17

## 2023-03-24 ENCOUNTER — APPOINTMENT (OUTPATIENT)
Dept: UROLOGY | Facility: CLINIC | Age: 48
End: 2023-03-24

## 2023-04-04 ENCOUNTER — APPOINTMENT (OUTPATIENT)
Dept: UROLOGY | Facility: CLINIC | Age: 48
End: 2023-04-04
Payer: MEDICARE

## 2023-04-04 VITALS
OXYGEN SATURATION: 98 % | SYSTOLIC BLOOD PRESSURE: 117 MMHG | DIASTOLIC BLOOD PRESSURE: 72 MMHG | HEART RATE: 83 BPM | RESPIRATION RATE: 16 BRPM

## 2023-04-04 PROCEDURE — 51700 IRRIGATION OF BLADDER: CPT

## 2023-04-21 ENCOUNTER — APPOINTMENT (OUTPATIENT)
Dept: NEUROLOGY | Facility: CLINIC | Age: 48
End: 2023-04-21
Payer: MEDICARE

## 2023-04-21 VITALS
WEIGHT: 111 LBS | BODY MASS INDEX: 22.38 KG/M2 | HEART RATE: 86 BPM | DIASTOLIC BLOOD PRESSURE: 79 MMHG | HEIGHT: 59 IN | SYSTOLIC BLOOD PRESSURE: 118 MMHG

## 2023-04-21 PROCEDURE — 99212 OFFICE O/P EST SF 10 MIN: CPT | Mod: 25

## 2023-04-21 PROCEDURE — 64615 CHEMODENERV MUSC MIGRAINE: CPT

## 2023-04-21 NOTE — HISTORY OF PRESENT ILLNESS
[FreeTextEntry1] : 47-year-old woman who is here for follow-up of her chronic migraine.  Since June patient has been doing well on the Aimovig however over the past month patient has recurrence of her migraine despite the Aimovig.  Patient has had no medication changes or infection that may account for her migraine exacerbation.\par \par interval history: she will be going to [facility] to visit her son for a month\par \par Interval history: Due to some complications she will is not able to go to [facility] to visit her son.  Patient was a last minute add-on as she had new onset of right-sided headaches that are pounding in nature.  This is different from her previous headaches.  She was agreeable to occipital nerve block.\par \par interval history: she is here for botox injections\par \par interval history: she has some migraines about 2 since last month.\par \par Interval history: Since her last visit patient had a breast biopsy of papilloma breast cancer.  Patient is upset about the scar on her breast and patient is debating whether she should start tamoxifen.  Patient was advised to follow-up with her breast oncologist for further discussion of treatment options.\par \par interval history: she is more accepting of her breast cancer diagnosis. She discussed her son needing a ped neurologist. info was given to her for peds neuro office.\par \par interval history: she is doing well. \par \par Interval history March 3 2023: Since her last visit she was stranded in [place] when her car broke down.  Patient did not have enough sleep and she was very stressed last week.  She had been having a migraine that is typical of her previous migraine but has not stopped.  Patient also has associated nausea.\par \par interval history 4/21/23 she is here for botox injections.

## 2023-04-25 ENCOUNTER — APPOINTMENT (OUTPATIENT)
Dept: UROLOGY | Facility: CLINIC | Age: 48
End: 2023-04-25

## 2023-04-26 ENCOUNTER — OUTPATIENT (OUTPATIENT)
Dept: OUTPATIENT SERVICES | Facility: HOSPITAL | Age: 48
LOS: 1 days | End: 2023-04-26
Payer: MEDICARE

## 2023-04-26 ENCOUNTER — APPOINTMENT (OUTPATIENT)
Dept: UROLOGY | Facility: CLINIC | Age: 48
End: 2023-04-26
Payer: MEDICARE

## 2023-04-26 VITALS — SYSTOLIC BLOOD PRESSURE: 121 MMHG | OXYGEN SATURATION: 97 % | DIASTOLIC BLOOD PRESSURE: 80 MMHG | HEART RATE: 80 BPM

## 2023-04-26 VITALS — OXYGEN SATURATION: 97 % | HEART RATE: 85 BPM | DIASTOLIC BLOOD PRESSURE: 75 MMHG | SYSTOLIC BLOOD PRESSURE: 121 MMHG

## 2023-04-26 DIAGNOSIS — R35.0 FREQUENCY OF MICTURITION: ICD-10-CM

## 2023-04-26 DIAGNOSIS — Z98.1 ARTHRODESIS STATUS: Chronic | ICD-10-CM

## 2023-04-26 DIAGNOSIS — D25.9 LEIOMYOMA OF UTERUS, UNSPECIFIED: Chronic | ICD-10-CM

## 2023-04-26 DIAGNOSIS — Z98.890 OTHER SPECIFIED POSTPROCEDURAL STATES: Chronic | ICD-10-CM

## 2023-04-26 PROCEDURE — 51700 IRRIGATION OF BLADDER: CPT

## 2023-04-27 LAB
APPEARANCE: CLEAR
BACTERIA: NEGATIVE /HPF
BILIRUBIN URINE: NEGATIVE
BLOOD URINE: NEGATIVE
CAST: 0 /LPF
COLOR: YELLOW
EPITHELIAL CELLS: 0 /HPF
GLUCOSE QUALITATIVE U: NEGATIVE MG/DL
KETONES URINE: NEGATIVE MG/DL
LEUKOCYTE ESTERASE URINE: NEGATIVE
MICROSCOPIC-UA: NORMAL
NITRITE URINE: NEGATIVE
PH URINE: 7
PROTEIN URINE: NEGATIVE MG/DL
RED BLOOD CELLS URINE: 0 /HPF
SPECIFIC GRAVITY URINE: 1.03
UROBILINOGEN URINE: 0.2 MG/DL
WHITE BLOOD CELLS URINE: 1 /HPF

## 2023-04-28 DIAGNOSIS — N39.0 URINARY TRACT INFECTION, SITE NOT SPECIFIED: ICD-10-CM

## 2023-04-28 DIAGNOSIS — R10.2 PELVIC AND PERINEAL PAIN: ICD-10-CM

## 2023-05-02 LAB — BACTERIA UR CULT: ABNORMAL

## 2023-05-10 NOTE — PHYSICAL EXAM
Pharmacy looking for a refill on   Atorvastatin 40mg 1 tab every morning. Thanks! [General Appearance - Alert] : alert

## 2023-05-10 NOTE — H&P PST ADULT - NSICDXPASTSURGICALHX_GEN_ALL_CORE_FT
Ok to order correct ultrasound study for abdominal aorta.   PAST SURGICAL HISTORY:  Fibroids removal- June 2014    History of bilateral tubal ligation     History of dilation and curettage     History of myomectomy     Lower leg fracture bilateral tib fib fx surgery- 1999    S/P spinal fusion and laminectomy with hardware- 2009    Uterine polyp lap uterine polyp removal- january 2014

## 2023-05-12 ENCOUNTER — APPOINTMENT (OUTPATIENT)
Dept: UROLOGY | Facility: CLINIC | Age: 48
End: 2023-05-12

## 2023-06-01 ENCOUNTER — OUTPATIENT (OUTPATIENT)
Dept: OUTPATIENT SERVICES | Facility: HOSPITAL | Age: 48
LOS: 1 days | End: 2023-06-01
Payer: MEDICARE

## 2023-06-01 ENCOUNTER — APPOINTMENT (OUTPATIENT)
Dept: UROLOGY | Facility: CLINIC | Age: 48
End: 2023-06-01
Payer: MEDICARE

## 2023-06-01 VITALS
WEIGHT: 111 LBS | SYSTOLIC BLOOD PRESSURE: 115 MMHG | DIASTOLIC BLOOD PRESSURE: 77 MMHG | HEART RATE: 85 BPM | OXYGEN SATURATION: 99 % | BODY MASS INDEX: 22.42 KG/M2

## 2023-06-01 DIAGNOSIS — Z98.890 OTHER SPECIFIED POSTPROCEDURAL STATES: Chronic | ICD-10-CM

## 2023-06-01 DIAGNOSIS — D25.9 LEIOMYOMA OF UTERUS, UNSPECIFIED: Chronic | ICD-10-CM

## 2023-06-01 DIAGNOSIS — Z98.1 ARTHRODESIS STATUS: Chronic | ICD-10-CM

## 2023-06-01 DIAGNOSIS — R35.0 FREQUENCY OF MICTURITION: ICD-10-CM

## 2023-06-01 DIAGNOSIS — Z98.51 TUBAL LIGATION STATUS: Chronic | ICD-10-CM

## 2023-06-01 DIAGNOSIS — N84.0 POLYP OF CORPUS UTERI: Chronic | ICD-10-CM

## 2023-06-01 DIAGNOSIS — S82.90XA UNSPECIFIED FRACTURE OF UNSPECIFIED LOWER LEG, INITIAL ENCOUNTER FOR CLOSED FRACTURE: Chronic | ICD-10-CM

## 2023-06-01 PROCEDURE — 51700 IRRIGATION OF BLADDER: CPT

## 2023-06-05 DIAGNOSIS — N30.10 INTERSTITIAL CYSTITIS (CHRONIC) WITHOUT HEMATURIA: ICD-10-CM

## 2023-06-08 ENCOUNTER — OUTPATIENT (OUTPATIENT)
Dept: OUTPATIENT SERVICES | Facility: HOSPITAL | Age: 48
LOS: 1 days | Discharge: HOME | End: 2023-06-08

## 2023-06-08 DIAGNOSIS — C50.919 MALIGNANT NEOPLASM OF UNSPECIFIED SITE OF UNSPECIFIED FEMALE BREAST: ICD-10-CM

## 2023-06-08 DIAGNOSIS — Z98.1 ARTHRODESIS STATUS: Chronic | ICD-10-CM

## 2023-06-08 DIAGNOSIS — Z98.890 OTHER SPECIFIED POSTPROCEDURAL STATES: Chronic | ICD-10-CM

## 2023-06-08 DIAGNOSIS — N84.0 POLYP OF CORPUS UTERI: Chronic | ICD-10-CM

## 2023-06-08 DIAGNOSIS — Z98.51 TUBAL LIGATION STATUS: Chronic | ICD-10-CM

## 2023-06-08 DIAGNOSIS — S82.90XA UNSPECIFIED FRACTURE OF UNSPECIFIED LOWER LEG, INITIAL ENCOUNTER FOR CLOSED FRACTURE: Chronic | ICD-10-CM

## 2023-06-08 DIAGNOSIS — D25.9 LEIOMYOMA OF UTERUS, UNSPECIFIED: Chronic | ICD-10-CM

## 2023-06-14 ENCOUNTER — APPOINTMENT (OUTPATIENT)
Dept: UROLOGY | Facility: CLINIC | Age: 48
End: 2023-06-14

## 2023-06-21 ENCOUNTER — RESULT REVIEW (OUTPATIENT)
Age: 48
End: 2023-06-21

## 2023-06-21 ENCOUNTER — APPOINTMENT (OUTPATIENT)
Dept: UROLOGY | Facility: CLINIC | Age: 48
End: 2023-06-21
Payer: MEDICARE

## 2023-06-21 ENCOUNTER — APPOINTMENT (OUTPATIENT)
Dept: HEMATOLOGY ONCOLOGY | Facility: CLINIC | Age: 48
End: 2023-06-21
Payer: MEDICARE

## 2023-06-21 ENCOUNTER — OUTPATIENT (OUTPATIENT)
Dept: OUTPATIENT SERVICES | Facility: HOSPITAL | Age: 48
LOS: 1 days | End: 2023-06-21
Payer: MEDICARE

## 2023-06-21 VITALS
SYSTOLIC BLOOD PRESSURE: 126 MMHG | OXYGEN SATURATION: 99 % | RESPIRATION RATE: 16 BRPM | WEIGHT: 113.32 LBS | DIASTOLIC BLOOD PRESSURE: 82 MMHG | BODY MASS INDEX: 22.89 KG/M2 | TEMPERATURE: 97.2 F | HEART RATE: 78 BPM

## 2023-06-21 VITALS
RESPIRATION RATE: 16 BRPM | DIASTOLIC BLOOD PRESSURE: 83 MMHG | HEART RATE: 76 BPM | SYSTOLIC BLOOD PRESSURE: 119 MMHG | OXYGEN SATURATION: 100 %

## 2023-06-21 VITALS — SYSTOLIC BLOOD PRESSURE: 132 MMHG | RESPIRATION RATE: 16 BRPM | HEART RATE: 76 BPM | DIASTOLIC BLOOD PRESSURE: 83 MMHG

## 2023-06-21 DIAGNOSIS — G47.00 INSOMNIA, UNSPECIFIED: ICD-10-CM

## 2023-06-21 DIAGNOSIS — Z98.51 TUBAL LIGATION STATUS: Chronic | ICD-10-CM

## 2023-06-21 DIAGNOSIS — Z98.1 ARTHRODESIS STATUS: Chronic | ICD-10-CM

## 2023-06-21 DIAGNOSIS — R35.0 FREQUENCY OF MICTURITION: ICD-10-CM

## 2023-06-21 DIAGNOSIS — Z98.890 OTHER SPECIFIED POSTPROCEDURAL STATES: Chronic | ICD-10-CM

## 2023-06-21 DIAGNOSIS — S82.90XA UNSPECIFIED FRACTURE OF UNSPECIFIED LOWER LEG, INITIAL ENCOUNTER FOR CLOSED FRACTURE: Chronic | ICD-10-CM

## 2023-06-21 LAB
BASOPHILS # BLD AUTO: 0.05 K/UL — SIGNIFICANT CHANGE UP (ref 0–0.2)
BASOPHILS NFR BLD AUTO: 1 % — SIGNIFICANT CHANGE UP (ref 0–2)
EOSINOPHIL # BLD AUTO: 0.11 K/UL — SIGNIFICANT CHANGE UP (ref 0–0.5)
EOSINOPHIL NFR BLD AUTO: 2 % — SIGNIFICANT CHANGE UP (ref 0–6)
HCT VFR BLD CALC: 38.3 % — SIGNIFICANT CHANGE UP (ref 34.5–45)
HGB BLD-MCNC: 12.3 G/DL — SIGNIFICANT CHANGE UP (ref 11.5–15.5)
LYMPHOCYTES # BLD AUTO: 1.82 K/UL — SIGNIFICANT CHANGE UP (ref 1–3.3)
LYMPHOCYTES # BLD AUTO: 34 % — SIGNIFICANT CHANGE UP (ref 13–44)
MCHC RBC-ENTMCNC: 28.7 PG — SIGNIFICANT CHANGE UP (ref 27–34)
MCHC RBC-ENTMCNC: 32.1 G/DL — SIGNIFICANT CHANGE UP (ref 32–36)
MCV RBC AUTO: 89.3 FL — SIGNIFICANT CHANGE UP (ref 80–100)
METAMYELOCYTES # FLD: 1 % — HIGH (ref 0–0)
MONOCYTES # BLD AUTO: 0.54 K/UL — SIGNIFICANT CHANGE UP (ref 0–0.9)
MONOCYTES NFR BLD AUTO: 10 % — SIGNIFICANT CHANGE UP (ref 2–14)
NEUTROPHILS # BLD AUTO: 2.79 K/UL — SIGNIFICANT CHANGE UP (ref 1.8–7.4)
NEUTROPHILS NFR BLD AUTO: 52 % — SIGNIFICANT CHANGE UP (ref 43–77)
NRBC # BLD: 0 /100 — SIGNIFICANT CHANGE UP (ref 0–0)
NRBC # BLD: SIGNIFICANT CHANGE UP /100 WBCS (ref 0–0)
PLAT MORPH BLD: NORMAL — SIGNIFICANT CHANGE UP
PLATELET # BLD AUTO: 212 K/UL — SIGNIFICANT CHANGE UP (ref 150–400)
RBC # BLD: 4.29 M/UL — SIGNIFICANT CHANGE UP (ref 3.8–5.2)
RBC # FLD: 13.2 % — SIGNIFICANT CHANGE UP (ref 10.3–14.5)
RBC BLD AUTO: SIGNIFICANT CHANGE UP
WBC # BLD: 5.36 K/UL — SIGNIFICANT CHANGE UP (ref 3.8–10.5)
WBC # FLD AUTO: 5.36 K/UL — SIGNIFICANT CHANGE UP (ref 3.8–10.5)

## 2023-06-21 PROCEDURE — 51700 IRRIGATION OF BLADDER: CPT

## 2023-06-21 PROCEDURE — 99214 OFFICE O/P EST MOD 30 MIN: CPT

## 2023-06-21 RX ORDER — ASPIRIN 81 MG/1
81 TABLET, CHEWABLE ORAL DAILY
Qty: 90 | Refills: 1 | Status: ACTIVE | COMMUNITY
Start: 2022-12-21 | End: 1900-01-01

## 2023-06-22 ENCOUNTER — APPOINTMENT (OUTPATIENT)
Dept: UROLOGY | Facility: CLINIC | Age: 48
End: 2023-06-22

## 2023-06-22 LAB
ALBUMIN SERPL ELPH-MCNC: 4.3 G/DL
ALP BLD-CCNC: 87 U/L
ALT SERPL-CCNC: 33 U/L
ANION GAP SERPL CALC-SCNC: 14 MMOL/L
AST SERPL-CCNC: 27 U/L
BILIRUB SERPL-MCNC: 0.2 MG/DL
BUN SERPL-MCNC: 10 MG/DL
CALCIUM SERPL-MCNC: 9.7 MG/DL
CHLORIDE SERPL-SCNC: 104 MMOL/L
CHOLEST SERPL-MCNC: 148 MG/DL
CO2 SERPL-SCNC: 24 MMOL/L
CREAT SERPL-MCNC: 0.53 MG/DL
EGFR: 114 ML/MIN/1.73M2
ESTRADIOL SERPL-MCNC: 7 PG/ML
FSH SERPL-MCNC: 35.4 IU/L
GLUCOSE SERPL-MCNC: 93 MG/DL
HDLC SERPL-MCNC: 31 MG/DL
LDLC SERPL CALC-MCNC: NORMAL MG/DL
NONHDLC SERPL-MCNC: 117 MG/DL
POTASSIUM SERPL-SCNC: 3.5 MMOL/L
PROT SERPL-MCNC: 6.8 G/DL
SODIUM SERPL-SCNC: 141 MMOL/L
TRIGL SERPL-MCNC: 524 MG/DL

## 2023-06-23 ENCOUNTER — APPOINTMENT (OUTPATIENT)
Dept: MRI IMAGING | Facility: CLINIC | Age: 48
End: 2023-06-23
Payer: MEDICARE

## 2023-06-23 ENCOUNTER — OUTPATIENT (OUTPATIENT)
Dept: OUTPATIENT SERVICES | Facility: HOSPITAL | Age: 48
LOS: 1 days | End: 2023-06-23
Payer: MEDICARE

## 2023-06-23 DIAGNOSIS — N84.0 POLYP OF CORPUS UTERI: Chronic | ICD-10-CM

## 2023-06-23 DIAGNOSIS — Z98.1 ARTHRODESIS STATUS: Chronic | ICD-10-CM

## 2023-06-23 DIAGNOSIS — D25.9 LEIOMYOMA OF UTERUS, UNSPECIFIED: Chronic | ICD-10-CM

## 2023-06-23 DIAGNOSIS — Z98.890 OTHER SPECIFIED POSTPROCEDURAL STATES: Chronic | ICD-10-CM

## 2023-06-23 DIAGNOSIS — Z98.51 TUBAL LIGATION STATUS: Chronic | ICD-10-CM

## 2023-06-23 DIAGNOSIS — N60.92 UNSPECIFIED BENIGN MAMMARY DYSPLASIA OF LEFT BREAST: ICD-10-CM

## 2023-06-23 DIAGNOSIS — S82.90XA UNSPECIFIED FRACTURE OF UNSPECIFIED LOWER LEG, INITIAL ENCOUNTER FOR CLOSED FRACTURE: Chronic | ICD-10-CM

## 2023-06-23 PROCEDURE — A9585: CPT

## 2023-06-23 PROCEDURE — C8937: CPT

## 2023-06-23 PROCEDURE — C8908: CPT

## 2023-06-23 PROCEDURE — 77049 MRI BREAST C-+ W/CAD BI: CPT | Mod: 26

## 2023-06-26 DIAGNOSIS — R10.2 PELVIC AND PERINEAL PAIN: ICD-10-CM

## 2023-06-26 DIAGNOSIS — N30.10 INTERSTITIAL CYSTITIS (CHRONIC) WITHOUT HEMATURIA: ICD-10-CM

## 2023-06-26 PROBLEM — G47.00 INSOMNIA: Status: ACTIVE | Noted: 2023-06-26

## 2023-06-26 NOTE — ASSESSMENT
[FreeTextEntry1] : Ms. HERNAN ABREU is a 46 year old premenopausal female who underwent left breast bx and was found to have a focus of atypical ductal hyperplasia 2021. Excision 2022 didn’t show additional pathologic findings, specifically in -situ carcinoma or invasive lesion. She has h/o previous benign biopsy. Her mother  from breast cancer. Tamoxifen started 3/2022 and switched to escalante 10 mg daily from 2022 due to s/e\par \par ADH: \par Pt is on Tamoxifen 10 mg daily. FElt cloudy and sever hot flashes with Escalante 20\par She is tolerating tamoxifen 10 mg with expected side effects. Reports good compliance. Plan to continue tamoxifen for 5 years. Will switch to AI after menopause. Will check FSH and estradiol today  \par GYN and opthal f/u annually due to risk of endometrial ca and cataracts respectively.  Up to date with Breast imaging MSR scheduled for 23\par - Rec to add ASA due to recent dx of high chol she is f/b cardiology 2023 BW she is noted to have elevated Triglycerides Advised to f/u with PCP /cardiology \par -  Mild hot flashes: 2/2 tamoxifen.  Advised to wear layers and use fan prn. Consider Effexor if get worse.\par - Patient is aware of signs and symptoms of DVT/PE. Patient will report if she develops acute onset chest pain shortness of breath, leg swelling or calf pain. \par \par \par RTC 6 m. \par \par

## 2023-06-26 NOTE — PHYSICAL EXAM
[Normal] : affect appropriate [Fully active, able to carry on all pre-disease performance without restriction] : Status 0 - Fully active, able to carry on all pre-disease performance without restriction [de-identified] : Well healed left lumpectomy scar, mild tenderness to left upper breast at 12-1:00 s/p surgical procedure,  right breast WNL

## 2023-06-26 NOTE — HISTORY OF PRESENT ILLNESS
[de-identified] : Ms. HERNAN ABREU is a 46 year old female here for an evaluation of breast cancer prevention. Her oncologic history is as follows:\par \par 11/15/2021:\par Bilateral mammogram and sonogram at [facility]: BI-RADS 0.\par Diagnostic imaging of the left breast recommended.\par 2021:\par Diagnostic left mammogram and sonogram at [facility]: BI-RADS 4.\par \par 2021: left breast bx\par intraductal papilloma with focal ADH\par \par 2022:\par Bilateral breast MRI at [facility]: BI-RADS 2.\par The area of enhancement extends 1.4 cm anterior to the clip, and 1.0 cm medial to the clip\par \par 2022, she had excision of atypia from the upper left breast, with preoperative localization.\par Oncoplastic closure: Dr. ALONSO Gupta.\par Surgical pathology:\par No residual atypia.\par + Intraductal papillomata.\par + Fibrocystic changes, with usual duct hyperplasia, and apocrine metaplasia.\par \par + FH:\par Mother: Breast cancer 64.\par She did not have genetic testing.\par No other relatives with breast cancer.\par \par Menarche at 8.\par  3, para 3, first at 16.\par LMP: >1-year ago, but not postmenopausal according to hormone measurements through her gynecologist.\par ***In fact, she uses transdermal HRT. She understands that this should cease.\par GYN: Dr. Justice JOHN.\par 2021 visit was unremarkable.\par \par \par + Migraines.\par Neurology: Dr. Allegra PEGUERO.\par Treated with Aimovig.\par She also receives Botox injections.\par \par  she had lumbar [facility]par Orthopedics: Dr. Willam MORGAN.\par \par \par \par Colonoscopy:\par At age 45 was normal, she does not recall the physician's name\par \par She has severe migraine, takes botox every 3 m, use meds PRN\par Insomnia and anxiety\par Psoriasis, on stelera every 3 m. 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 She isn't having periods x 2 yrs but estrogen level in premenopausal range. She is s/p progesterone treatment but no periods yet. She will f/u with gyn\par She took tamoxifen x 2 days. Took at night. Woke up with nausea in am . No vomiting. She had poor appetite. She stopped tamoxifen after 2 days and sx resolved. \par Since she is premenopausal, tamoxifen is the only option. She does have anxiety and takes xanax PRN and Ambien HS. Rec to see psychiatrist re anxiety\par Try tamoxifen after breakfast or lunch, try zofran PRN\par genetic testing pending\par \par \par 2022\par She restarted tamoxifen 2022 .\par She stopped taking tamoxifen x 3 weeks ago \par She felt cloudy in her head, hot flashes \par s/p second opinion at [facility] high risk clinic ( seen by NP) - discussed low dose tamoxifen and contrast mammo/sono. She discussed imaging with Dr Ogden\par genetics- invitae negative 2022\par She is unable to tolerate full dose, doesn’t want unprotected either due to FH and personal history of ADH. Discussed low dose escalante based on small study ( n=500) published in [employer]. https://www.ncbi.nlm.nih.gov/pmc/articles/PYL0937377/. \par We discussed it is not standard of care but can be tired in this patient due to intolerable s/e to full dose tamoxifen. Pt was in agreement \par \par 2022\par Patient is currently is on low does tamoxifen 10 mg hot flashes have decreased and are tolerable. Nausea and fogginess has improved with lower dose..  She reports weight loss but weight stable.\par No vaginal discharge or bleeding noted\par She saw GYN 2022 reminded to schedule US\par SHe has 3 children 29 yo girl, 16 yo boy and 8 yo boy \par 2022 mammo BIRADS 3  Newly seen nodule in the central slightly inner left breast for which 6 month left diagnostic mammography/targeted sono rec.\par \par  [de-identified] : Ms. HERNAN ABREU is a 46 year old premenopausal female who underwent left breast bx and was found to have a focus of atypical ductal hyperplasia 2021. Excision 2022 didn’t show additional pathologic findings, specifically in -situ carcinoma or invasive lesion. She has h/o previous benign biopsy. Her mother  from breast cancer. Tamoxifen started 3/2022 and switched to escalante 10 mg daily from 2022 due to s/e\par \par 23\par Pt is on Tamoxifen 10 mg daily and doesn’t have s/e. Felt cloudy and severe hot flashes with Escalante 20. She reports tolerable hot flashes Good compliance, occasional insomnia uses ambien,  She denies mood changes, wt gain, vaginal dryness, SOB, chest pain, leg swelling or clotting issues. \par LMP date:, no bleeding or vaginal discharge.\par GYN - seeing annually, scheduled for 2023\par Breast imaging: MRI  [facility] for 23\par Opthal: seeing annually\par SHe in concerned about wt loss. Wt is same in the chart. She is seeing GI and had colonoscopy.\par Her chol is high ,f/b cardio to last visit 2023\par Last eye exam 2023

## 2023-06-29 RX ORDER — NARATRIPTAN 2.5 MG/1
2.5 TABLET, FILM COATED ORAL
Qty: 15 | Refills: 3 | Status: ACTIVE | COMMUNITY
Start: 2023-06-29 | End: 1900-01-01

## 2023-07-17 NOTE — ASU DISCHARGE PLAN (ADULT/PEDIATRIC) - NO EXERCISE DURATION
1 week Wartpeel Counseling:  I discussed with the patient the risks of Wartpeel including but not limited to erythema, scaling, itching, weeping, crusting, and pain.

## 2023-08-01 ENCOUNTER — APPOINTMENT (OUTPATIENT)
Dept: NEUROLOGY | Facility: CLINIC | Age: 48
End: 2023-08-01
Payer: MEDICARE

## 2023-08-01 VITALS
SYSTOLIC BLOOD PRESSURE: 121 MMHG | BODY MASS INDEX: 22.58 KG/M2 | WEIGHT: 112 LBS | HEIGHT: 59 IN | DIASTOLIC BLOOD PRESSURE: 85 MMHG | HEART RATE: 86 BPM

## 2023-08-01 PROCEDURE — 64615 CHEMODENERV MUSC MIGRAINE: CPT

## 2023-08-01 NOTE — PROCEDURE
[FreeTextEntry1] : consent obtained safety precautions discussed. botox 200 U 5u per muscle site:  procerusX1 corrugatorX2 frontalis X4 temporalis X8 occipitalisX6 trapeziusX6 155 u total were injected  45 u were discarded. pt tolerated procedure well.

## 2023-08-02 RX ORDER — PREDNISONE 10 MG/1
10 TABLET ORAL
Qty: 21 | Refills: 0 | Status: ACTIVE | COMMUNITY
Start: 2023-08-02 | End: 1900-01-01

## 2023-08-10 ENCOUNTER — ASOB RESULT (OUTPATIENT)
Age: 48
End: 2023-08-10

## 2023-08-10 ENCOUNTER — LABORATORY RESULT (OUTPATIENT)
Age: 48
End: 2023-08-10

## 2023-08-10 ENCOUNTER — APPOINTMENT (OUTPATIENT)
Dept: OBGYN | Facility: CLINIC | Age: 48
End: 2023-08-10
Payer: MEDICARE

## 2023-08-10 VITALS — BODY MASS INDEX: 22.82 KG/M2 | SYSTOLIC BLOOD PRESSURE: 131 MMHG | WEIGHT: 113 LBS | DIASTOLIC BLOOD PRESSURE: 80 MMHG

## 2023-08-10 DIAGNOSIS — Z01.419 ENCOUNTER FOR GYNECOLOGICAL EXAMINATION (GENERAL) (ROUTINE) W/OUT ABNORMAL FINDINGS: ICD-10-CM

## 2023-08-10 PROCEDURE — 76830 TRANSVAGINAL US NON-OB: CPT

## 2023-08-10 PROCEDURE — 99396 PREV VISIT EST AGE 40-64: CPT

## 2023-08-11 ENCOUNTER — TRANSCRIPTION ENCOUNTER (OUTPATIENT)
Age: 48
End: 2023-08-11

## 2023-08-28 NOTE — HISTORY OF PRESENT ILLNESS
[FreeTextEntry1] : pt feels good no complaints. She needs a pap. Is being followed by breast surgeon on tamoxiphen . No bleeding.

## 2023-08-28 NOTE — COUNSELING
[Bladder Hygiene] : bladder hygiene [Vaccines] : vaccines [Lab Results] : lab results [Medication Management] : medication management

## 2023-08-30 ENCOUNTER — RX RENEWAL (OUTPATIENT)
Age: 48
End: 2023-08-30

## 2023-08-30 RX ORDER — NARATRIPTAN 2.5 MG/1
2.5 TABLET, FILM COATED ORAL
Qty: 9 | Refills: 1 | Status: ACTIVE | COMMUNITY
Start: 2018-03-06 | End: 1900-01-01

## 2023-09-12 ENCOUNTER — NON-APPOINTMENT (OUTPATIENT)
Age: 48
End: 2023-09-12

## 2023-09-14 ENCOUNTER — TRANSCRIPTION ENCOUNTER (OUTPATIENT)
Age: 48
End: 2023-09-14

## 2023-09-18 LAB
APPEARANCE: CLEAR
BACTERIA: ABNORMAL /HPF
BILIRUBIN URINE: NEGATIVE
BLOOD URINE: NEGATIVE
CALCIUM OXALATE CRYSTALS: PRESENT
CAST: 0 /LPF
COLOR: YELLOW
EPITHELIAL CELLS: 13 /HPF
GLUCOSE QUALITATIVE U: NEGATIVE MG/DL
KETONES URINE: NEGATIVE MG/DL
LEUKOCYTE ESTERASE URINE: NEGATIVE
MICROSCOPIC-UA: NORMAL
MUCUS: PRESENT
NITRITE URINE: NEGATIVE
PH URINE: 5.5
PROTEIN URINE: NEGATIVE MG/DL
RED BLOOD CELLS URINE: 1 /HPF
REVIEW: NORMAL
SPECIFIC GRAVITY URINE: 1.03
UROBILINOGEN URINE: 0.2 MG/DL
WHITE BLOOD CELLS URINE: 4 /HPF

## 2023-09-19 ENCOUNTER — APPOINTMENT (OUTPATIENT)
Dept: UROLOGY | Facility: CLINIC | Age: 48
End: 2023-09-19
Payer: MEDICARE

## 2023-09-19 ENCOUNTER — OUTPATIENT (OUTPATIENT)
Dept: OUTPATIENT SERVICES | Facility: HOSPITAL | Age: 48
LOS: 1 days | Discharge: HOME | End: 2023-09-19

## 2023-09-19 VITALS
HEART RATE: 79 BPM | OXYGEN SATURATION: 98 % | HEIGHT: 59 IN | DIASTOLIC BLOOD PRESSURE: 87 MMHG | BODY MASS INDEX: 22.78 KG/M2 | RESPIRATION RATE: 17 BRPM | SYSTOLIC BLOOD PRESSURE: 125 MMHG | WEIGHT: 113 LBS

## 2023-09-19 DIAGNOSIS — C50.919 MALIGNANT NEOPLASM OF UNSPECIFIED SITE OF UNSPECIFIED FEMALE BREAST: ICD-10-CM

## 2023-09-19 DIAGNOSIS — Z98.1 ARTHRODESIS STATUS: Chronic | ICD-10-CM

## 2023-09-19 DIAGNOSIS — Z98.51 TUBAL LIGATION STATUS: Chronic | ICD-10-CM

## 2023-09-19 DIAGNOSIS — N84.0 POLYP OF CORPUS UTERI: Chronic | ICD-10-CM

## 2023-09-19 DIAGNOSIS — D25.9 LEIOMYOMA OF UTERUS, UNSPECIFIED: Chronic | ICD-10-CM

## 2023-09-19 DIAGNOSIS — R32 UNSPECIFIED URINARY INCONTINENCE: ICD-10-CM

## 2023-09-19 DIAGNOSIS — Z98.890 OTHER SPECIFIED POSTPROCEDURAL STATES: Chronic | ICD-10-CM

## 2023-09-19 DIAGNOSIS — S82.90XA UNSPECIFIED FRACTURE OF UNSPECIFIED LOWER LEG, INITIAL ENCOUNTER FOR CLOSED FRACTURE: Chronic | ICD-10-CM

## 2023-09-19 LAB — BACTERIA UR CULT: ABNORMAL

## 2023-09-19 PROCEDURE — 51700 IRRIGATION OF BLADDER: CPT

## 2023-09-19 PROCEDURE — 99213 OFFICE O/P EST LOW 20 MIN: CPT | Mod: 25

## 2023-09-20 LAB
AMORPHOUS URATE CRYSTALS: PRESENT
APPEARANCE: ABNORMAL
BACTERIA: NEGATIVE /HPF
BILIRUBIN URINE: NEGATIVE
BLOOD URINE: NEGATIVE
CAST: 0 /LPF
COLOR: YELLOW
EPITHELIAL CELLS: 2 /HPF
GLUCOSE QUALITATIVE U: NEGATIVE MG/DL
KETONES URINE: NEGATIVE MG/DL
LEUKOCYTE ESTERASE URINE: NEGATIVE
MICROSCOPIC-UA: NORMAL
NITRITE URINE: NEGATIVE
PH URINE: 5.5
PROTEIN URINE: NEGATIVE MG/DL
RED BLOOD CELLS URINE: 0 /HPF
REVIEW: NORMAL
SPECIFIC GRAVITY URINE: 1.03
UROBILINOGEN URINE: 0.2 MG/DL
WHITE BLOOD CELLS URINE: 0 /HPF

## 2023-09-21 LAB — BACTERIA UR CULT: NORMAL

## 2023-09-24 ENCOUNTER — OUTPATIENT (OUTPATIENT)
Dept: OUTPATIENT SERVICES | Facility: HOSPITAL | Age: 48
LOS: 1 days | End: 2023-09-24
Payer: MEDICARE

## 2023-09-24 ENCOUNTER — APPOINTMENT (OUTPATIENT)
Dept: ULTRASOUND IMAGING | Facility: IMAGING CENTER | Age: 48
End: 2023-09-24
Payer: MEDICARE

## 2023-09-24 DIAGNOSIS — D25.9 LEIOMYOMA OF UTERUS, UNSPECIFIED: Chronic | ICD-10-CM

## 2023-09-24 DIAGNOSIS — Z98.890 OTHER SPECIFIED POSTPROCEDURAL STATES: Chronic | ICD-10-CM

## 2023-09-24 DIAGNOSIS — S82.90XA UNSPECIFIED FRACTURE OF UNSPECIFIED LOWER LEG, INITIAL ENCOUNTER FOR CLOSED FRACTURE: Chronic | ICD-10-CM

## 2023-09-24 DIAGNOSIS — Z98.1 ARTHRODESIS STATUS: Chronic | ICD-10-CM

## 2023-09-24 DIAGNOSIS — R39.9 UNSPECIFIED SYMPTOMS AND SIGNS INVOLVING THE GENITOURINARY SYSTEM: ICD-10-CM

## 2023-09-24 DIAGNOSIS — Z00.8 ENCOUNTER FOR OTHER GENERAL EXAMINATION: ICD-10-CM

## 2023-09-24 DIAGNOSIS — Z98.51 TUBAL LIGATION STATUS: Chronic | ICD-10-CM

## 2023-09-24 PROCEDURE — 76770 US EXAM ABDO BACK WALL COMP: CPT | Mod: 26

## 2023-09-24 PROCEDURE — 76770 US EXAM ABDO BACK WALL COMP: CPT

## 2023-09-27 ENCOUNTER — APPOINTMENT (OUTPATIENT)
Dept: HEMATOLOGY ONCOLOGY | Facility: CLINIC | Age: 48
End: 2023-09-27
Payer: MEDICARE

## 2023-09-27 ENCOUNTER — RESULT REVIEW (OUTPATIENT)
Age: 48
End: 2023-09-27

## 2023-09-27 VITALS
HEIGHT: 58.98 IN | WEIGHT: 116.84 LBS | OXYGEN SATURATION: 99 % | BODY MASS INDEX: 23.56 KG/M2 | DIASTOLIC BLOOD PRESSURE: 84 MMHG | TEMPERATURE: 97.9 F | HEART RATE: 77 BPM | SYSTOLIC BLOOD PRESSURE: 129 MMHG | RESPIRATION RATE: 17 BRPM

## 2023-09-27 DIAGNOSIS — R23.2 FLUSHING: ICD-10-CM

## 2023-09-27 DIAGNOSIS — T45.1X5A FLUSHING: ICD-10-CM

## 2023-09-27 PROCEDURE — 99214 OFFICE O/P EST MOD 30 MIN: CPT

## 2023-10-04 ENCOUNTER — OUTPATIENT (OUTPATIENT)
Dept: OUTPATIENT SERVICES | Facility: HOSPITAL | Age: 48
LOS: 1 days | End: 2023-10-04
Payer: MEDICARE

## 2023-10-04 ENCOUNTER — APPOINTMENT (OUTPATIENT)
Dept: UROLOGY | Facility: CLINIC | Age: 48
End: 2023-10-04
Payer: MEDICARE

## 2023-10-04 VITALS
HEART RATE: 79 BPM | SYSTOLIC BLOOD PRESSURE: 116 MMHG | TEMPERATURE: 98.2 F | RESPIRATION RATE: 16 BRPM | DIASTOLIC BLOOD PRESSURE: 82 MMHG

## 2023-10-04 DIAGNOSIS — D25.9 LEIOMYOMA OF UTERUS, UNSPECIFIED: Chronic | ICD-10-CM

## 2023-10-04 DIAGNOSIS — Z98.51 TUBAL LIGATION STATUS: Chronic | ICD-10-CM

## 2023-10-04 DIAGNOSIS — N84.0 POLYP OF CORPUS UTERI: Chronic | ICD-10-CM

## 2023-10-04 DIAGNOSIS — R35.0 FREQUENCY OF MICTURITION: ICD-10-CM

## 2023-10-04 PROCEDURE — 51700 IRRIGATION OF BLADDER: CPT

## 2023-10-05 DIAGNOSIS — N30.10 INTERSTITIAL CYSTITIS (CHRONIC) WITHOUT HEMATURIA: ICD-10-CM

## 2023-10-19 ENCOUNTER — APPOINTMENT (OUTPATIENT)
Dept: UROLOGY | Facility: CLINIC | Age: 48
End: 2023-10-19

## 2023-10-30 ENCOUNTER — APPOINTMENT (OUTPATIENT)
Dept: UROLOGY | Facility: CLINIC | Age: 48
End: 2023-10-30

## 2023-11-02 ENCOUNTER — NON-APPOINTMENT (OUTPATIENT)
Age: 48
End: 2023-11-02

## 2023-11-02 ENCOUNTER — APPOINTMENT (OUTPATIENT)
Dept: NEUROLOGY | Facility: CLINIC | Age: 48
End: 2023-11-02
Payer: MEDICARE

## 2023-11-02 VITALS
HEIGHT: 58 IN | WEIGHT: 105 LBS | HEART RATE: 83 BPM | BODY MASS INDEX: 22.04 KG/M2 | DIASTOLIC BLOOD PRESSURE: 75 MMHG | SYSTOLIC BLOOD PRESSURE: 115 MMHG

## 2023-11-02 PROCEDURE — 64615 CHEMODENERV MUSC MIGRAINE: CPT

## 2023-11-02 RX ORDER — SUMATRIPTAN 5 MG/100UL
5 SPRAY NASAL
Qty: 1 | Refills: 0 | Status: ACTIVE | COMMUNITY
Start: 2023-11-02 | End: 1900-01-01

## 2023-11-13 ENCOUNTER — OUTPATIENT (OUTPATIENT)
Dept: OUTPATIENT SERVICES | Facility: HOSPITAL | Age: 48
LOS: 1 days | End: 2023-11-13
Payer: MEDICARE

## 2023-11-13 ENCOUNTER — APPOINTMENT (OUTPATIENT)
Dept: UROLOGY | Facility: CLINIC | Age: 48
End: 2023-11-13
Payer: MEDICARE

## 2023-11-13 ENCOUNTER — EMERGENCY (EMERGENCY)
Facility: HOSPITAL | Age: 48
LOS: 1 days | Discharge: HOME | End: 2023-11-13
Attending: EMERGENCY MEDICINE
Payer: MEDICARE

## 2023-11-13 VITALS
OXYGEN SATURATION: 99 % | HEIGHT: 59 IN | SYSTOLIC BLOOD PRESSURE: 148 MMHG | TEMPERATURE: 98 F | DIASTOLIC BLOOD PRESSURE: 88 MMHG | WEIGHT: 115.08 LBS | RESPIRATION RATE: 18 BRPM | HEART RATE: 82 BPM

## 2023-11-13 DIAGNOSIS — N30.10 INTERSTITIAL CYSTITIS (CHRONIC) W/OUT HEMATURIA: ICD-10-CM

## 2023-11-13 DIAGNOSIS — Z98.890 OTHER SPECIFIED POSTPROCEDURAL STATES: Chronic | ICD-10-CM

## 2023-11-13 DIAGNOSIS — Z98.1 ARTHRODESIS STATUS: Chronic | ICD-10-CM

## 2023-11-13 DIAGNOSIS — S82.90XA UNSPECIFIED FRACTURE OF UNSPECIFIED LOWER LEG, INITIAL ENCOUNTER FOR CLOSED FRACTURE: Chronic | ICD-10-CM

## 2023-11-13 DIAGNOSIS — Z98.51 TUBAL LIGATION STATUS: Chronic | ICD-10-CM

## 2023-11-13 DIAGNOSIS — D25.9 LEIOMYOMA OF UTERUS, UNSPECIFIED: Chronic | ICD-10-CM

## 2023-11-13 DIAGNOSIS — R35.0 FREQUENCY OF MICTURITION: ICD-10-CM

## 2023-11-13 DIAGNOSIS — N84.0 POLYP OF CORPUS UTERI: Chronic | ICD-10-CM

## 2023-11-13 LAB
ALBUMIN SERPL ELPH-MCNC: 4 G/DL — SIGNIFICANT CHANGE UP (ref 3.3–5)
ALBUMIN SERPL ELPH-MCNC: 4 G/DL — SIGNIFICANT CHANGE UP (ref 3.3–5)
ALP SERPL-CCNC: 81 U/L — SIGNIFICANT CHANGE UP (ref 40–120)
ALP SERPL-CCNC: 81 U/L — SIGNIFICANT CHANGE UP (ref 40–120)
ALT FLD-CCNC: 59 U/L — HIGH (ref 10–45)
ALT FLD-CCNC: 59 U/L — HIGH (ref 10–45)
ANION GAP SERPL CALC-SCNC: 12 MMOL/L — SIGNIFICANT CHANGE UP (ref 5–17)
ANION GAP SERPL CALC-SCNC: 12 MMOL/L — SIGNIFICANT CHANGE UP (ref 5–17)
APTT BLD: 27.1 SEC — SIGNIFICANT CHANGE UP (ref 24.5–35.6)
APTT BLD: 27.1 SEC — SIGNIFICANT CHANGE UP (ref 24.5–35.6)
AST SERPL-CCNC: 47 U/L — HIGH (ref 10–40)
AST SERPL-CCNC: 47 U/L — HIGH (ref 10–40)
BASOPHILS # BLD AUTO: 0.02 K/UL — SIGNIFICANT CHANGE UP (ref 0–0.2)
BASOPHILS # BLD AUTO: 0.02 K/UL — SIGNIFICANT CHANGE UP (ref 0–0.2)
BASOPHILS NFR BLD AUTO: 0.4 % — SIGNIFICANT CHANGE UP (ref 0–2)
BASOPHILS NFR BLD AUTO: 0.4 % — SIGNIFICANT CHANGE UP (ref 0–2)
BILIRUB SERPL-MCNC: 0.2 MG/DL — SIGNIFICANT CHANGE UP (ref 0.2–1.2)
BILIRUB SERPL-MCNC: 0.2 MG/DL — SIGNIFICANT CHANGE UP (ref 0.2–1.2)
BUN SERPL-MCNC: 12 MG/DL — SIGNIFICANT CHANGE UP (ref 7–23)
BUN SERPL-MCNC: 12 MG/DL — SIGNIFICANT CHANGE UP (ref 7–23)
CALCIUM SERPL-MCNC: 9.1 MG/DL — SIGNIFICANT CHANGE UP (ref 8.4–10.5)
CALCIUM SERPL-MCNC: 9.1 MG/DL — SIGNIFICANT CHANGE UP (ref 8.4–10.5)
CHLORIDE SERPL-SCNC: 108 MMOL/L — SIGNIFICANT CHANGE UP (ref 96–108)
CHLORIDE SERPL-SCNC: 108 MMOL/L — SIGNIFICANT CHANGE UP (ref 96–108)
CO2 SERPL-SCNC: 23 MMOL/L — SIGNIFICANT CHANGE UP (ref 22–31)
CO2 SERPL-SCNC: 23 MMOL/L — SIGNIFICANT CHANGE UP (ref 22–31)
CREAT SERPL-MCNC: 0.54 MG/DL — SIGNIFICANT CHANGE UP (ref 0.5–1.3)
CREAT SERPL-MCNC: 0.54 MG/DL — SIGNIFICANT CHANGE UP (ref 0.5–1.3)
CRP SERPL-MCNC: <3 MG/L — SIGNIFICANT CHANGE UP (ref 0–4)
CRP SERPL-MCNC: <3 MG/L — SIGNIFICANT CHANGE UP (ref 0–4)
EGFR: 113 ML/MIN/1.73M2 — SIGNIFICANT CHANGE UP
EGFR: 113 ML/MIN/1.73M2 — SIGNIFICANT CHANGE UP
EOSINOPHIL # BLD AUTO: 0.1 K/UL — SIGNIFICANT CHANGE UP (ref 0–0.5)
EOSINOPHIL # BLD AUTO: 0.1 K/UL — SIGNIFICANT CHANGE UP (ref 0–0.5)
EOSINOPHIL NFR BLD AUTO: 1.8 % — SIGNIFICANT CHANGE UP (ref 0–6)
EOSINOPHIL NFR BLD AUTO: 1.8 % — SIGNIFICANT CHANGE UP (ref 0–6)
GLUCOSE SERPL-MCNC: 136 MG/DL — HIGH (ref 70–99)
GLUCOSE SERPL-MCNC: 136 MG/DL — HIGH (ref 70–99)
HCG UR QL: NEGATIVE — SIGNIFICANT CHANGE UP
HCG UR QL: NEGATIVE — SIGNIFICANT CHANGE UP
HCT VFR BLD CALC: 34.3 % — LOW (ref 34.5–45)
HCT VFR BLD CALC: 34.3 % — LOW (ref 34.5–45)
HGB BLD-MCNC: 11.3 G/DL — LOW (ref 11.5–15.5)
HGB BLD-MCNC: 11.3 G/DL — LOW (ref 11.5–15.5)
IMM GRANULOCYTES NFR BLD AUTO: 0.5 % — SIGNIFICANT CHANGE UP (ref 0–0.9)
IMM GRANULOCYTES NFR BLD AUTO: 0.5 % — SIGNIFICANT CHANGE UP (ref 0–0.9)
INR BLD: 1.01 RATIO — SIGNIFICANT CHANGE UP (ref 0.85–1.18)
INR BLD: 1.01 RATIO — SIGNIFICANT CHANGE UP (ref 0.85–1.18)
LYMPHOCYTES # BLD AUTO: 2.18 K/UL — SIGNIFICANT CHANGE UP (ref 1–3.3)
LYMPHOCYTES # BLD AUTO: 2.18 K/UL — SIGNIFICANT CHANGE UP (ref 1–3.3)
LYMPHOCYTES # BLD AUTO: 39.2 % — SIGNIFICANT CHANGE UP (ref 13–44)
LYMPHOCYTES # BLD AUTO: 39.2 % — SIGNIFICANT CHANGE UP (ref 13–44)
MAGNESIUM SERPL-MCNC: 2 MG/DL — SIGNIFICANT CHANGE UP (ref 1.6–2.6)
MAGNESIUM SERPL-MCNC: 2 MG/DL — SIGNIFICANT CHANGE UP (ref 1.6–2.6)
MCHC RBC-ENTMCNC: 30.2 PG — SIGNIFICANT CHANGE UP (ref 27–34)
MCHC RBC-ENTMCNC: 30.2 PG — SIGNIFICANT CHANGE UP (ref 27–34)
MCHC RBC-ENTMCNC: 32.9 GM/DL — SIGNIFICANT CHANGE UP (ref 32–36)
MCHC RBC-ENTMCNC: 32.9 GM/DL — SIGNIFICANT CHANGE UP (ref 32–36)
MCV RBC AUTO: 91.7 FL — SIGNIFICANT CHANGE UP (ref 80–100)
MCV RBC AUTO: 91.7 FL — SIGNIFICANT CHANGE UP (ref 80–100)
MONOCYTES # BLD AUTO: 0.51 K/UL — SIGNIFICANT CHANGE UP (ref 0–0.9)
MONOCYTES # BLD AUTO: 0.51 K/UL — SIGNIFICANT CHANGE UP (ref 0–0.9)
MONOCYTES NFR BLD AUTO: 9.2 % — SIGNIFICANT CHANGE UP (ref 2–14)
MONOCYTES NFR BLD AUTO: 9.2 % — SIGNIFICANT CHANGE UP (ref 2–14)
NEUTROPHILS # BLD AUTO: 2.72 K/UL — SIGNIFICANT CHANGE UP (ref 1.8–7.4)
NEUTROPHILS # BLD AUTO: 2.72 K/UL — SIGNIFICANT CHANGE UP (ref 1.8–7.4)
NEUTROPHILS NFR BLD AUTO: 48.9 % — SIGNIFICANT CHANGE UP (ref 43–77)
NEUTROPHILS NFR BLD AUTO: 48.9 % — SIGNIFICANT CHANGE UP (ref 43–77)
NRBC # BLD: 0 /100 WBCS — SIGNIFICANT CHANGE UP (ref 0–0)
NRBC # BLD: 0 /100 WBCS — SIGNIFICANT CHANGE UP (ref 0–0)
PLATELET # BLD AUTO: 192 K/UL — SIGNIFICANT CHANGE UP (ref 150–400)
PLATELET # BLD AUTO: 192 K/UL — SIGNIFICANT CHANGE UP (ref 150–400)
POTASSIUM SERPL-MCNC: 3.8 MMOL/L — SIGNIFICANT CHANGE UP (ref 3.5–5.3)
POTASSIUM SERPL-MCNC: 3.8 MMOL/L — SIGNIFICANT CHANGE UP (ref 3.5–5.3)
POTASSIUM SERPL-SCNC: 3.8 MMOL/L — SIGNIFICANT CHANGE UP (ref 3.5–5.3)
POTASSIUM SERPL-SCNC: 3.8 MMOL/L — SIGNIFICANT CHANGE UP (ref 3.5–5.3)
PROCALCITONIN SERPL-MCNC: 0.03 NG/ML — SIGNIFICANT CHANGE UP (ref 0.02–0.1)
PROCALCITONIN SERPL-MCNC: 0.03 NG/ML — SIGNIFICANT CHANGE UP (ref 0.02–0.1)
PROT SERPL-MCNC: 6.3 G/DL — SIGNIFICANT CHANGE UP (ref 6–8.3)
PROT SERPL-MCNC: 6.3 G/DL — SIGNIFICANT CHANGE UP (ref 6–8.3)
PROTHROM AB SERPL-ACNC: 11.1 SEC — SIGNIFICANT CHANGE UP (ref 9.5–13)
PROTHROM AB SERPL-ACNC: 11.1 SEC — SIGNIFICANT CHANGE UP (ref 9.5–13)
RBC # BLD: 3.74 M/UL — LOW (ref 3.8–5.2)
RBC # BLD: 3.74 M/UL — LOW (ref 3.8–5.2)
RBC # FLD: 12.8 % — SIGNIFICANT CHANGE UP (ref 10.3–14.5)
RBC # FLD: 12.8 % — SIGNIFICANT CHANGE UP (ref 10.3–14.5)
SODIUM SERPL-SCNC: 143 MMOL/L — SIGNIFICANT CHANGE UP (ref 135–145)
SODIUM SERPL-SCNC: 143 MMOL/L — SIGNIFICANT CHANGE UP (ref 135–145)
WBC # BLD: 5.56 K/UL — SIGNIFICANT CHANGE UP (ref 3.8–10.5)
WBC # BLD: 5.56 K/UL — SIGNIFICANT CHANGE UP (ref 3.8–10.5)
WBC # FLD AUTO: 5.56 K/UL — SIGNIFICANT CHANGE UP (ref 3.8–10.5)
WBC # FLD AUTO: 5.56 K/UL — SIGNIFICANT CHANGE UP (ref 3.8–10.5)

## 2023-11-13 PROCEDURE — 85730 THROMBOPLASTIN TIME PARTIAL: CPT

## 2023-11-13 PROCEDURE — 83735 ASSAY OF MAGNESIUM: CPT

## 2023-11-13 PROCEDURE — 81025 URINE PREGNANCY TEST: CPT

## 2023-11-13 PROCEDURE — 84145 PROCALCITONIN (PCT): CPT

## 2023-11-13 PROCEDURE — 51700 IRRIGATION OF BLADDER: CPT

## 2023-11-13 PROCEDURE — 73130 X-RAY EXAM OF HAND: CPT | Mod: 26,LT

## 2023-11-13 PROCEDURE — 86140 C-REACTIVE PROTEIN: CPT

## 2023-11-13 PROCEDURE — 85025 COMPLETE CBC W/AUTO DIFF WBC: CPT

## 2023-11-13 PROCEDURE — 85652 RBC SED RATE AUTOMATED: CPT

## 2023-11-13 PROCEDURE — 85610 PROTHROMBIN TIME: CPT

## 2023-11-13 PROCEDURE — 99285 EMERGENCY DEPT VISIT HI MDM: CPT

## 2023-11-13 PROCEDURE — 80053 COMPREHEN METABOLIC PANEL: CPT

## 2023-11-13 PROCEDURE — 73130 X-RAY EXAM OF HAND: CPT

## 2023-11-13 PROCEDURE — 99283 EMERGENCY DEPT VISIT LOW MDM: CPT | Mod: 25

## 2023-11-13 NOTE — ED PROVIDER NOTE - WR INTERPRETATION 1
soft tissue swelling 2nd (index) finger, no ross fx / cortical changes, soft tissue gas -- Gautam RICCI

## 2023-11-13 NOTE — ED ADULT TRIAGE NOTE - CHIEF COMPLAINT QUOTE
patient endorsing atraumatic bruising to left index finger since 4pm, sent from  for US. on baby ASA daily. cap refill <2 seconds

## 2023-11-13 NOTE — ED ADULT NURSE NOTE - NS ED NURSE RECORD ANOTHER VITAL SIGN
Discharge instructions reviewed with patient by PA. Pt understands discharge instructions with no further questions. Pt resting comfortably in bed, in no acute distress. Pt calling  to come  her home.   
Yes

## 2023-11-13 NOTE — ED ADULT NURSE NOTE - NSFALLUNIVINTERV_ED_ALL_ED
Bed/Stretcher in lowest position, wheels locked, appropriate side rails in place/Call bell, personal items and telephone in reach/Instruct patient to call for assistance before getting out of bed/chair/stretcher/Non-slip footwear applied when patient is off stretcher/[place] to call system/Physically safe environment - no spills, clutter or unnecessary equipment/Purposeful proactive rounding/Room/bathroom lighting operational, light cord in reach

## 2023-11-13 NOTE — ED PROVIDER NOTE - OBJECTIVE STATEMENT
47y/o F pt with PMHx endometriosis, fibromyalgia, ovarian cysts, psoriasis, migraines, presenting into the ED for evaluation of finger bruising and swelling. 47y/o F pt with PMHx endometriosis, fibromyalgia, ovarian cysts, psoriasis, migraines, presenting into the ED for evaluation of finger bruising and swelling beginning today.  Patient states today she noticed bruising and swelling to her left index finger, and pain with palpation and movement of the finger.  She reports some pain radiating up her left forearm.  Patient states that she cut her left index finger with a knife last week.  Denies fevers, chills, chest pain, shortness breath, abdominal pain, nausea, vomiting, diarrhea, numbness, tingling.

## 2023-11-13 NOTE — ED ADULT NURSE NOTE - OBJECTIVE STATEMENT
47yo F pmh fibromyalgia, endometriosis, psoriasis, migraines, presents to ED with c/o L 2nd finger bruising. pt reports atraumatic bruising and swelling to the L index finger that began today and has been worsening over the last 12 hours. reports doing housework and cleaning but denies any specific trauma to the finger. pt denies any associated f/c, cp, sob, numbness, tingling, or LUE pain/swelling. on eval + ecchymosis to L 2nd digit with + ttp to 2nd PIP. 2+ distal pulses. sensation intact. pt seen and eval by ED MD. IV placed, labs drawn and sent. pending XRAY. plan of care discussed.

## 2023-11-13 NOTE — ED PROVIDER NOTE - PHYSICAL EXAMINATION
Const: Awake, alert, no acute distress.  Well appearing.  Moving comfortably on stretcher.  HEENT: NC/AT.  Moist mucous membranes.  Eyes: Extraocular movements intact b/l.  No scleral icterus.  Neck: Full ROM without pain.  Cardiac: Extremities well perfused, normal coloration, no peripheral cyanosis.  No peripheral edema.  Resp: Speaking in full sentences.  No evidence of respiratory distress.  Abd: Non-distended.  Skin: Normal coloration.  No rashes, abrasions or lacerations.  Neuro: Awake, alert & oriented x 3.  Moves all extremities symmetrically.  No obvious focal deficits. Const: Awake, alert, no acute distress.  Well appearing.  Moving comfortably on stretcher.  HEENT: NC/AT.  Moist mucous membranes.  Eyes: Extraocular movements intact b/l.  No scleral icterus.  Neck: Full ROM without pain.  Cardiac: Extremities well perfused, normal coloration, no peripheral cyanosis.  No peripheral edema.  Regular rate and rhythm, S1 S2 present.  No calf tenderness.  Resp: Speaking in full sentences.  No evidence of respiratory distress.  Breath sounds clear to auscultation b/l.  Abd: Non-distended.  MSK:  purpulish ecchymosis and swelling to anterior and posterior aspect of L 2nd digit, overlying DIP, middle phalanx, PIP, and 2nd MCP joint.  small approx. 3mm laceration to cuticle of 2nd finger.  mild tenderness overlying L 2nd DIP.  FROM of L fingers, L thumb, L wrist, L elbow, L shoulder.  b/l radial pulses intact.  normal  strength b/l, normal strength and sensation to b/l UEs.  Neuro: Awake, alert & oriented x 3.  Moves all extremities symmetrically.  No obvious focal deficits.

## 2023-11-13 NOTE — ED PROVIDER NOTE - ATTENDING CONTRIBUTION TO CARE
------------ATTENDING NOTE------------  RHD pt c/o noticing swelling, some redness and bruising to L index (2nd) finger today, pt describes doing lots of cleaning/housework/dishes/laundry today, cannot recall any specific trauma, redness/bruising/swelling has increased over past 12 hrs, no fevers, no chest pain/discomfort or sob/dyspnea, no LUE edema or tenderness or axillary lymphadenopathy or L breast changes, exam w/ tender 2nd PIP and pain w/ passive flexion but +FROM 5/5 nvi w/ bcr distally, no additional abnormal bleeding/bruising/petechiae or other digit involvement, awaiting labs and close reassessments -->  - Michel Florez MD   ----------------------------------------------- ------------ATTENDING NOTE------------  RHD pt c/o noticing swelling, some redness and bruising to L index (2nd) finger today, pt describes doing lots of cleaning/housework/dishes/laundry today, cannot recall any specific trauma, redness/bruising/swelling has increased over past 12 hrs, no fevers, no chest pain/discomfort or sob/dyspnea, no LUE edema or tenderness or axillary lymphadenopathy or L breast changes, exam w/ tender 2nd PIP and pain w/ passive flexion but +FROM 5/5 nvi w/ bcr distally, no additional abnormal bleeding/bruising/petechiae or other digit involvement, awaiting labs and close reassessments --> initial labs wnl, area improved w/ ice, multiple in depth shared decision making w/ pt and optining for dc w/ close outpt fu and strict return precautions, pt took phone pics of area, has BCR distally and +2 radial pulses, no DVT changes, not an osler node/janeway lesion and no murmur/chest pain, nml VS at dc, in depth dw pt about ddx, tx, ruiz, continued close outpt fu.  - Michel Florez MD   ----------------------------------------------- ------------ATTENDING NOTE------------  RHD pt c/o noticing swelling, some redness and bruising to L index (2nd) finger today, pt describes doing lots of cleaning/housework/dishes/laundry today, cannot recall any specific trauma, redness/bruising/swelling has increased over past 12 hrs, no fevers, no chest pain/discomfort or sob/dyspnea, no LUE edema or tenderness or axillary lymphadenopathy or L breast changes, exam w/ tender 2nd PIP and pain w/ passive flexion but +FROM 5/5 nvi w/ bcr distally, no additional abnormal bleeding/bruising/petechiae or other digit involvement, pt has mild superficial distal cuticle injury 1 wk ago that healed uncomplicated and nml appearing now, awaiting labs and close reassessments --> initial labs wnl, area improved w/ ice, multiple in depth shared decision making w/ pt and optining for dc w/ close outpt fu and strict return precautions, pt took phone pics of area, has BCR distally and +2 radial pulses, no DVT changes, not an osler node/janeway lesion and no murmur/chest pain, nml VS at dc, in depth dw pt about ddx, tx, ruiz, continued close outpt fu.  - Michel Florez MD   -----------------------------------------------

## 2023-11-13 NOTE — ED PROVIDER NOTE - PATIENT PORTAL LINK FT
You can access the FollowMyHealth Patient Portal offered by [facility] by registering at the following website: [email or web address]. By joining [facility] FollowMyHealth portal, you will also be able to view your health information using other applications (apps) compatible with our system.

## 2023-11-13 NOTE — ED ADULT TRIAGE NOTE - NS ED TRIAGE AVPU SCALE
A/P: 24yo POD#2 s/p LTCS.  Patient is stable and doing well post-operatively.    - Continue regular diet.  - Increase ambulation.  - Continue motrin, tylenol, oxycodone PRN for pain control.     John Bates PGY1   Alert-The patient is alert, awake and responds to voice. The patient is oriented to time, place, and person. The triage nurse is able to obtain subjective information.

## 2023-11-13 NOTE — ED PROVIDER NOTE - NSFOLLOWUPINSTRUCTIONS_ED_ALL_ED_FT
See your Primary Doctor in next 2 days for follow up -- call to discuss.    Ice finger, avoid strenuous activities, continue range of motion exercises.    Seek immediate medical care for new or worse symptoms or concerns.      - fevers     - increased redness    - increased swelling     - increased pain    - increased difficulty using hand/fingers    - additional abnormal bleeding, bruising, swelling    - chest pain, shortness of breath    - any additional concerns

## 2023-11-14 VITALS — HEART RATE: 80 BPM | RESPIRATION RATE: 12 BRPM | OXYGEN SATURATION: 100 %

## 2023-11-14 DIAGNOSIS — N30.10 INTERSTITIAL CYSTITIS (CHRONIC) WITHOUT HEMATURIA: ICD-10-CM

## 2023-11-14 LAB
ERYTHROCYTE [SEDIMENTATION RATE] IN BLOOD: 2 MM/HR — SIGNIFICANT CHANGE UP (ref 0–15)
ERYTHROCYTE [SEDIMENTATION RATE] IN BLOOD: 2 MM/HR — SIGNIFICANT CHANGE UP (ref 0–15)

## 2023-11-27 ENCOUNTER — APPOINTMENT (OUTPATIENT)
Dept: UROLOGY | Facility: CLINIC | Age: 48
End: 2023-11-27

## 2023-11-27 ENCOUNTER — RX RENEWAL (OUTPATIENT)
Age: 48
End: 2023-11-27

## 2023-12-11 ENCOUNTER — APPOINTMENT (OUTPATIENT)
Dept: UROLOGY | Facility: CLINIC | Age: 48
End: 2023-12-11

## 2023-12-11 ENCOUNTER — OUTPATIENT (OUTPATIENT)
Dept: OUTPATIENT SERVICES | Facility: HOSPITAL | Age: 48
LOS: 1 days | End: 2023-12-11
Payer: MEDICARE

## 2023-12-11 ENCOUNTER — APPOINTMENT (OUTPATIENT)
Dept: UROLOGY | Facility: CLINIC | Age: 48
End: 2023-12-11
Payer: MEDICARE

## 2023-12-11 VITALS — HEART RATE: 90 BPM | SYSTOLIC BLOOD PRESSURE: 125 MMHG | DIASTOLIC BLOOD PRESSURE: 85 MMHG

## 2023-12-11 VITALS — DIASTOLIC BLOOD PRESSURE: 74 MMHG | HEART RATE: 94 BPM | SYSTOLIC BLOOD PRESSURE: 135 MMHG

## 2023-12-11 DIAGNOSIS — S82.90XA UNSPECIFIED FRACTURE OF UNSPECIFIED LOWER LEG, INITIAL ENCOUNTER FOR CLOSED FRACTURE: Chronic | ICD-10-CM

## 2023-12-11 DIAGNOSIS — Z98.890 OTHER SPECIFIED POSTPROCEDURAL STATES: Chronic | ICD-10-CM

## 2023-12-11 DIAGNOSIS — Z98.51 TUBAL LIGATION STATUS: Chronic | ICD-10-CM

## 2023-12-11 DIAGNOSIS — R10.2 PELVIC AND PERINEAL PAIN: ICD-10-CM

## 2023-12-11 DIAGNOSIS — N84.0 POLYP OF CORPUS UTERI: Chronic | ICD-10-CM

## 2023-12-11 DIAGNOSIS — Z98.1 ARTHRODESIS STATUS: Chronic | ICD-10-CM

## 2023-12-11 DIAGNOSIS — R35.0 FREQUENCY OF MICTURITION: ICD-10-CM

## 2023-12-11 DIAGNOSIS — D25.9 LEIOMYOMA OF UTERUS, UNSPECIFIED: Chronic | ICD-10-CM

## 2023-12-11 PROCEDURE — 51700 IRRIGATION OF BLADDER: CPT

## 2023-12-12 DIAGNOSIS — N30.90 CYSTITIS, UNSPECIFIED WITHOUT HEMATURIA: ICD-10-CM

## 2023-12-12 DIAGNOSIS — N30.10 INTERSTITIAL CYSTITIS (CHRONIC) WITHOUT HEMATURIA: ICD-10-CM

## 2023-12-12 DIAGNOSIS — R10.2 PELVIC AND PERINEAL PAIN: ICD-10-CM

## 2023-12-14 ENCOUNTER — NON-APPOINTMENT (OUTPATIENT)
Age: 48
End: 2023-12-14

## 2023-12-18 ENCOUNTER — APPOINTMENT (OUTPATIENT)
Dept: UROLOGY | Facility: CLINIC | Age: 48
End: 2023-12-18

## 2023-12-20 ENCOUNTER — APPOINTMENT (OUTPATIENT)
Dept: HEMATOLOGY ONCOLOGY | Facility: CLINIC | Age: 48
End: 2023-12-20

## 2024-01-04 ENCOUNTER — APPOINTMENT (OUTPATIENT)
Dept: MAMMOGRAPHY | Facility: IMAGING CENTER | Age: 49
End: 2024-01-04
Payer: MEDICARE

## 2024-01-04 ENCOUNTER — APPOINTMENT (OUTPATIENT)
Dept: ULTRASOUND IMAGING | Facility: IMAGING CENTER | Age: 49
End: 2024-01-04
Payer: MEDICARE

## 2024-01-04 ENCOUNTER — RESULT REVIEW (OUTPATIENT)
Age: 49
End: 2024-01-04

## 2024-01-04 ENCOUNTER — OUTPATIENT (OUTPATIENT)
Dept: OUTPATIENT SERVICES | Facility: HOSPITAL | Age: 49
LOS: 1 days | End: 2024-01-04
Payer: MEDICARE

## 2024-01-04 DIAGNOSIS — Z98.890 OTHER SPECIFIED POSTPROCEDURAL STATES: Chronic | ICD-10-CM

## 2024-01-04 DIAGNOSIS — N60.92 UNSPECIFIED BENIGN MAMMARY DYSPLASIA OF LEFT BREAST: ICD-10-CM

## 2024-01-04 DIAGNOSIS — D25.9 LEIOMYOMA OF UTERUS, UNSPECIFIED: Chronic | ICD-10-CM

## 2024-01-04 DIAGNOSIS — N84.0 POLYP OF CORPUS UTERI: Chronic | ICD-10-CM

## 2024-01-04 DIAGNOSIS — Z98.51 TUBAL LIGATION STATUS: Chronic | ICD-10-CM

## 2024-01-04 DIAGNOSIS — S82.90XA UNSPECIFIED FRACTURE OF UNSPECIFIED LOWER LEG, INITIAL ENCOUNTER FOR CLOSED FRACTURE: Chronic | ICD-10-CM

## 2024-01-04 DIAGNOSIS — Z98.1 ARTHRODESIS STATUS: Chronic | ICD-10-CM

## 2024-01-04 PROCEDURE — 77066 DX MAMMO INCL CAD BI: CPT

## 2024-01-04 PROCEDURE — 77067 SCR MAMMO BI INCL CAD: CPT | Mod: 26

## 2024-01-04 PROCEDURE — G0279: CPT

## 2024-01-04 PROCEDURE — 76641 ULTRASOUND BREAST COMPLETE: CPT | Mod: 26,50

## 2024-01-04 PROCEDURE — 76641 ULTRASOUND BREAST COMPLETE: CPT

## 2024-01-04 PROCEDURE — 77063 BREAST TOMOSYNTHESIS BI: CPT

## 2024-01-04 PROCEDURE — 77067 SCR MAMMO BI INCL CAD: CPT

## 2024-01-04 PROCEDURE — 77063 BREAST TOMOSYNTHESIS BI: CPT | Mod: 26

## 2024-01-08 ENCOUNTER — APPOINTMENT (OUTPATIENT)
Dept: UROLOGY | Facility: CLINIC | Age: 49
End: 2024-01-08
Payer: MEDICARE

## 2024-01-08 ENCOUNTER — OUTPATIENT (OUTPATIENT)
Dept: OUTPATIENT SERVICES | Facility: HOSPITAL | Age: 49
LOS: 1 days | End: 2024-01-08
Payer: MEDICARE

## 2024-01-08 VITALS
TEMPERATURE: 97.5 F | RESPIRATION RATE: 16 BRPM | OXYGEN SATURATION: 99 % | HEART RATE: 62 BPM | SYSTOLIC BLOOD PRESSURE: 127 MMHG | DIASTOLIC BLOOD PRESSURE: 82 MMHG

## 2024-01-08 VITALS
HEART RATE: 62 BPM | SYSTOLIC BLOOD PRESSURE: 133 MMHG | DIASTOLIC BLOOD PRESSURE: 81 MMHG | RESPIRATION RATE: 16 BRPM | OXYGEN SATURATION: 99 % | TEMPERATURE: 97.5 F

## 2024-01-08 DIAGNOSIS — Z98.51 TUBAL LIGATION STATUS: Chronic | ICD-10-CM

## 2024-01-08 DIAGNOSIS — Z98.1 ARTHRODESIS STATUS: Chronic | ICD-10-CM

## 2024-01-08 DIAGNOSIS — Z98.890 OTHER SPECIFIED POSTPROCEDURAL STATES: Chronic | ICD-10-CM

## 2024-01-08 DIAGNOSIS — S82.90XA UNSPECIFIED FRACTURE OF UNSPECIFIED LOWER LEG, INITIAL ENCOUNTER FOR CLOSED FRACTURE: Chronic | ICD-10-CM

## 2024-01-08 DIAGNOSIS — N84.0 POLYP OF CORPUS UTERI: Chronic | ICD-10-CM

## 2024-01-08 DIAGNOSIS — R35.0 FREQUENCY OF MICTURITION: ICD-10-CM

## 2024-01-08 DIAGNOSIS — D25.9 LEIOMYOMA OF UTERUS, UNSPECIFIED: Chronic | ICD-10-CM

## 2024-01-08 PROCEDURE — 51700 IRRIGATION OF BLADDER: CPT

## 2024-01-09 DIAGNOSIS — N30.10 INTERSTITIAL CYSTITIS (CHRONIC) WITHOUT HEMATURIA: ICD-10-CM

## 2024-01-09 PROBLEM — R92.8 ABNORMAL FINDING ON BREAST IMAGING: Status: ACTIVE | Noted: 2024-01-09

## 2024-01-09 NOTE — REASON FOR VISIT
[Consultation] : a consultation visit [FreeTextEntry1] : mastopexy [Follow-Up Visit] : a follow-up visit for [Other: _____] : [unfilled] [FreeTextEntry2] : Annual breast exam, breast cancer risk score = 31.4

## 2024-01-09 NOTE — ASSESSMENT
[FreeTextEntry1] : Her preoperative breast MRI (January 2022): BI-RADS 2.  + Tamoxifen started March 2022. SELF D/C'D due to side effects in May 2022    12/22/22: Bilateral mammogram and sonogram at [facility]: BI-RADS 2. Prescription is entered for December 2023.  I also Submitted a prescription for a June 2023 breast MRI, Breast cancer risk score = 31.4  If she remains asymptomatic, with normal imaging, we should see her in another year, sooner if needed   06/27/23. I returned her call. June 23, 2023, she had her annual breast MRI: BI-RADS 2. Radiology describes a 5 mm dermal lesion in the left breast (UOQ). She was recently when vacation and had an insect bite in that region. It is slowly resolving.   01/09/2024. She and I spoke on the phone. January 4, 2024, she had her annual, bilateral, mammograms and sonograms at [facility]: BI-RADS 3. 1.  6-month follow-up left mammogram recommended for new microcalcifications. 2.  6-month follow-up right breast ultrasound (11:00) recommended for probably benign changes. Both prescriptions entered today. My office will send her paper copies of these requests.

## 2024-01-09 NOTE — REVIEW OF SYSTEMS
[Negative] : Endocrine [de-identified] : Sciatica [de-identified] : Migraines [FreeTextEntry1] : Increased risk of breast cancer

## 2024-01-09 NOTE — HISTORY OF PRESENT ILLNESS
[de-identified] : Considering mastopexy with Dr Sosa GOFF @[facility]\par \par \par 47 year-old lady.\par \par \par 2022:\par Excision of atypia from her left breast (upper).\par Oncoplastic closure: Dr. ALONSO SNELL.\par Surgical pathology:\par Intraductal papilloma, fibrocystic change with duct hyperplasia and apocrine metaplasia.\par \par He is planning fat grafting because she has a little indentation from the lumpectomy.\par \par \par 2022:\par Preoperative breast MRI at [facility]: BI-RADS 2.\par \par \par Postoperatively she met with medical oncology (Dr. Aura CORDERO).\par + Tamoxifen, started 2022.\par SELF-D/D'd May 2022 secondary to side effects.\par \par Dr. Cordero is aware.\par Patient is not a candidate for other therapies (such as aromatase inhibitors) since she is currently premenopausal.\par We will revisit this topic after her menopause.....................\par \par \par 2022:\par She was referred by her gynecologist (Dr. Justice ROBLEDO) with  atypical ductal hyperplasia (focal ADH), AND an intraductal papilloma of the LEFT BREAST (12-1:00).\par \par This was an area of focal asymmetry identified on screening bilateral breast imaging, and subsequent diagnostic studies (below).\par \par Diagnosis was established on stereotactic biopsy performed at [place].\par \par No signs or symptoms related to either breast.\par \par No previous breast biopsies.\par \par \par + FH:\par Mother: Breast cancer 64.\par She did not have genetic testing.\par No other relatives with breast cancer.\par \par A paternal aunt had ovarian cancer.\par \par Not Ashkenazi.\par \par \par Our patient had genetic testing at [facility] through medical oncology in : NO deleterious mutations\par \par \par No other family history of malignancy.\par \par \par Menarche at 8.\par  3, para 3, first at 16.\par LMP: >1-year ago, but not postmenopausal according to hormone measurements through her gynecologist.\par ***In fact, she uses transdermal HRT.\par She understands that this should cease.\par \par \par Breast cancer risk score =31.4%\par \par \par 11/15/2021:\par Bilateral mammogram and sonogram at [place]: BI-RADS 0.\par Diagnostic imaging of the left breast recommended.\par 2021:\par Diagnostic left mammogram and sonogram at [place]: BI-RADS 4.\par \par \par \par PMD: \par Dr Gaby POWERS\par She used to see Dr Amy Hernández\par \par No pacemaker or defibrillator.\par \par No anticoagulants.\par \par + Migraines.\par Neurology: Dr. Allegra PEGUERO.\par Treated with Aimovig.\par She also receives Botox injections.\par \par + Low back pain (LBP).\par + LLE sciatica.\par  she had lumbar fusion surgery at [facility].\par Orthopedics: Dr. Willam MORGAN.\par \par \par GYN: Dr. Noel PERKINS.\par 2022 visit was unremarkable.\par She used to see Dr. Justice Robledo.\par \par + Problems with urination.\par Urology: Dr. Katiuska HEDRICK and Dr. Benito TOLEDO\par \par \par Colonoscopy:\par  was normal, she does not recall the physician's name\par

## 2024-01-09 NOTE — PHYSICAL EXAM
[de-identified] : b/l breast\par soft, non tender.\par Scar noted on left breast.\par N-N R23 L22.5 N-IMF R9.5 L10.5\par BW R12.5 L 12.5\par NAC R4x4.5 L4x4.5\par  [de-identified] : minimal divarication noted\par excess skin + lower abdomen and in supraumbilical region\par  [Normal] : supple, no neck mass and thyroid not enlarged [Normal Neck Lymph Nodes] : normal neck lymph nodes  [Normal Supraclavicular Lymph Nodes] : normal supraclavicular lymph nodes [Normal Axillary Lymph Nodes] : normal axillary lymph nodes [Normal] : normal appearance, no rash, nodules, vesicles, ulcers, erythema [de-identified] : Groins not examined [de-identified] : Below

## 2024-01-19 ENCOUNTER — NON-APPOINTMENT (OUTPATIENT)
Age: 49
End: 2024-01-19

## 2024-01-20 ENCOUNTER — INPATIENT (INPATIENT)
Facility: HOSPITAL | Age: 49
LOS: 1 days | Discharge: HOME | DRG: 287 | End: 2024-01-22
Attending: INTERNAL MEDICINE | Admitting: INTERNAL MEDICINE
Payer: MEDICARE

## 2024-01-20 VITALS
DIASTOLIC BLOOD PRESSURE: 72 MMHG | SYSTOLIC BLOOD PRESSURE: 108 MMHG | HEART RATE: 90 BPM | OXYGEN SATURATION: 95 % | HEIGHT: 59 IN | WEIGHT: 115.08 LBS | TEMPERATURE: 98 F | RESPIRATION RATE: 16 BRPM

## 2024-01-20 DIAGNOSIS — Z98.1 ARTHRODESIS STATUS: Chronic | ICD-10-CM

## 2024-01-20 DIAGNOSIS — D25.9 LEIOMYOMA OF UTERUS, UNSPECIFIED: Chronic | ICD-10-CM

## 2024-01-20 DIAGNOSIS — S82.90XA UNSPECIFIED FRACTURE OF UNSPECIFIED LOWER LEG, INITIAL ENCOUNTER FOR CLOSED FRACTURE: Chronic | ICD-10-CM

## 2024-01-20 DIAGNOSIS — Z98.890 OTHER SPECIFIED POSTPROCEDURAL STATES: Chronic | ICD-10-CM

## 2024-01-20 DIAGNOSIS — N84.0 POLYP OF CORPUS UTERI: Chronic | ICD-10-CM

## 2024-01-20 DIAGNOSIS — Z98.51 TUBAL LIGATION STATUS: Chronic | ICD-10-CM

## 2024-01-20 LAB
ALBUMIN SERPL ELPH-MCNC: 4.3 G/DL — SIGNIFICANT CHANGE UP (ref 3.3–5)
ALP SERPL-CCNC: 101 U/L — SIGNIFICANT CHANGE UP (ref 40–120)
ALT FLD-CCNC: 64 U/L — HIGH (ref 10–45)
ANION GAP SERPL CALC-SCNC: 11 MMOL/L — SIGNIFICANT CHANGE UP (ref 5–17)
APTT BLD: 27 SEC — SIGNIFICANT CHANGE UP (ref 24.5–35.6)
AST SERPL-CCNC: 55 U/L — HIGH (ref 10–40)
BASOPHILS # BLD AUTO: 0.03 K/UL — SIGNIFICANT CHANGE UP (ref 0–0.2)
BASOPHILS NFR BLD AUTO: 0.4 % — SIGNIFICANT CHANGE UP (ref 0–2)
BILIRUB SERPL-MCNC: 0.4 MG/DL — SIGNIFICANT CHANGE UP (ref 0.2–1.2)
BUN SERPL-MCNC: 12 MG/DL — SIGNIFICANT CHANGE UP (ref 7–23)
CALCIUM SERPL-MCNC: 9.2 MG/DL — SIGNIFICANT CHANGE UP (ref 8.4–10.5)
CHLORIDE SERPL-SCNC: 103 MMOL/L — SIGNIFICANT CHANGE UP (ref 96–108)
CO2 SERPL-SCNC: 24 MMOL/L — SIGNIFICANT CHANGE UP (ref 22–31)
CREAT SERPL-MCNC: 0.5 MG/DL — SIGNIFICANT CHANGE UP (ref 0.5–1.3)
CRP SERPL-MCNC: 7 MG/L — HIGH (ref 0–4)
EGFR: 115 ML/MIN/1.73M2 — SIGNIFICANT CHANGE UP
EOSINOPHIL # BLD AUTO: 0.13 K/UL — SIGNIFICANT CHANGE UP (ref 0–0.5)
EOSINOPHIL NFR BLD AUTO: 1.5 % — SIGNIFICANT CHANGE UP (ref 0–6)
FLUAV AG NPH QL: SIGNIFICANT CHANGE UP
FLUBV AG NPH QL: SIGNIFICANT CHANGE UP
GLUCOSE SERPL-MCNC: 96 MG/DL — SIGNIFICANT CHANGE UP (ref 70–99)
HCT VFR BLD CALC: 39.1 % — SIGNIFICANT CHANGE UP (ref 34.5–45)
HGB BLD-MCNC: 12.7 G/DL — SIGNIFICANT CHANGE UP (ref 11.5–15.5)
IMM GRANULOCYTES NFR BLD AUTO: 0.5 % — SIGNIFICANT CHANGE UP (ref 0–0.9)
INR BLD: 1.06 RATIO — SIGNIFICANT CHANGE UP (ref 0.85–1.18)
LIDOCAIN IGE QN: 34 U/L — SIGNIFICANT CHANGE UP (ref 7–60)
LYMPHOCYTES # BLD AUTO: 1.9 K/UL — SIGNIFICANT CHANGE UP (ref 1–3.3)
LYMPHOCYTES # BLD AUTO: 22.5 % — SIGNIFICANT CHANGE UP (ref 13–44)
MAGNESIUM SERPL-MCNC: 2.2 MG/DL — SIGNIFICANT CHANGE UP (ref 1.6–2.6)
MCHC RBC-ENTMCNC: 29.3 PG — SIGNIFICANT CHANGE UP (ref 27–34)
MCHC RBC-ENTMCNC: 32.5 GM/DL — SIGNIFICANT CHANGE UP (ref 32–36)
MCV RBC AUTO: 90.3 FL — SIGNIFICANT CHANGE UP (ref 80–100)
MONOCYTES # BLD AUTO: 0.53 K/UL — SIGNIFICANT CHANGE UP (ref 0–0.9)
MONOCYTES NFR BLD AUTO: 6.3 % — SIGNIFICANT CHANGE UP (ref 2–14)
NEUTROPHILS # BLD AUTO: 5.83 K/UL — SIGNIFICANT CHANGE UP (ref 1.8–7.4)
NEUTROPHILS NFR BLD AUTO: 68.8 % — SIGNIFICANT CHANGE UP (ref 43–77)
NRBC # BLD: 0 /100 WBCS — SIGNIFICANT CHANGE UP (ref 0–0)
NT-PROBNP SERPL-SCNC: <36 PG/ML — SIGNIFICANT CHANGE UP (ref 0–300)
PLATELET # BLD AUTO: 188 K/UL — SIGNIFICANT CHANGE UP (ref 150–400)
POTASSIUM SERPL-MCNC: 3.9 MMOL/L — SIGNIFICANT CHANGE UP (ref 3.5–5.3)
POTASSIUM SERPL-SCNC: 3.9 MMOL/L — SIGNIFICANT CHANGE UP (ref 3.5–5.3)
PROT SERPL-MCNC: 7 G/DL — SIGNIFICANT CHANGE UP (ref 6–8.3)
PROTHROM AB SERPL-ACNC: 11.1 SEC — SIGNIFICANT CHANGE UP (ref 9.5–13)
RBC # BLD: 4.33 M/UL — SIGNIFICANT CHANGE UP (ref 3.8–5.2)
RBC # FLD: 12.9 % — SIGNIFICANT CHANGE UP (ref 10.3–14.5)
RSV RNA NPH QL NAA+NON-PROBE: SIGNIFICANT CHANGE UP
SARS-COV-2 RNA SPEC QL NAA+PROBE: SIGNIFICANT CHANGE UP
SODIUM SERPL-SCNC: 138 MMOL/L — SIGNIFICANT CHANGE UP (ref 135–145)
TROPONIN T, HIGH SENSITIVITY RESULT: 10 NG/L — SIGNIFICANT CHANGE UP (ref 0–51)
TROPONIN T, HIGH SENSITIVITY RESULT: 8 NG/L — SIGNIFICANT CHANGE UP (ref 0–51)
WBC # BLD: 8.46 K/UL — SIGNIFICANT CHANGE UP (ref 3.8–10.5)
WBC # FLD AUTO: 8.46 K/UL — SIGNIFICANT CHANGE UP (ref 3.8–10.5)

## 2024-01-20 PROCEDURE — 99223 1ST HOSP IP/OBS HIGH 75: CPT

## 2024-01-20 PROCEDURE — 71045 X-RAY EXAM CHEST 1 VIEW: CPT | Mod: 26

## 2024-01-20 PROCEDURE — 93308 TTE F-UP OR LMTD: CPT | Mod: 26

## 2024-01-20 NOTE — ED PROVIDER NOTE - CLINICAL SUMMARY MEDICAL DECISION MAKING FREE TEXT BOX
48F with PMH migraine, fibromyalgia, breast intraductal papilloma w/ focal atypical duct hyperplasia s/p L breast lumpectomy 2/2022 on Tamoxifen who presents to ED with a 2-day hx of pressure-like chest pain with radiation down L upper ext. Associated with an episode of dyspnea and diaphoresis overnight that spontaneously resolved. Pt had [place] [facility] [place] (neg respiratory panel), was found to have ST changes in leads I and aVL, and given a total of aspirin 324 mg today before coming to ED, and SL nitroglycerin, which pt states helped alleviate chest pain. Chest pain is intermittent, denies worsening of chest pain with positional change or respiration. VSS in ED, physical exam notable for subtle I-II/VI systolic murmur and clear lungs. Ordered labs and ECG for initial ACS workup. 48F with PMH HLD, migraine, fibromyalgia, breast intraductal papilloma w/ focal atypical duct hyperplasia s/p L breast lumpectomy 2/2022 on Tamoxifen who presents to ED with a 2-day hx of pressure-like chest pain with radiation down L upper ext. Associated with an episode of dyspnea and diaphoresis overnight that spontaneously resolved. Pt had presented [facility] for sore throat (neg respiratory panel), was found to have ST changes in leads I and aVL, and given a total of aspirin 324 mg today before coming to ED, and SL nitroglycerin, which pt states helped alleviate chest pain. Chest pain is intermittent, denies worsening of chest pain with positional change or respiration. VSS in ED, physical exam notable for subtle I-II/VI systolic murmur and clear lungs. Ordered labs and ECG for initial ACS workup. 48F with PMH HLD, migraine, fibromyalgia, breast intraductal papilloma w/ focal atypical duct hyperplasia s/p L breast lumpectomy 2/2022 on Tamoxifen who presents to ED with a 2-day hx of pressure-like chest pain with radiation down L upper ext. Associated with an episode of dyspnea and diaphoresis overnight that spontaneously resolved. Pt had presented [facility] for sore throat (neg respiratory panel), was found to have ST changes in leads I and aVL, and given a total of aspirin 324 mg today before coming to ED, and SL nitroglycerin, which pt states helped alleviate chest pain. Chest pain is intermittent, denies worsening of chest pain with positional change or respiration. VSS in ED, physical exam notable for subtle I-II/VI systolic murmur and clear lungs. Ordered labs and ECG for initial ACS workup.  Attending Lisette Murillo: 47 yo female presenting with chest pain. pt with strong family history of cardiac disease. upon arrival ekg performed showing NSR iwthout evidence of st elevation. pt went to urgent care and was told she had an abomral ekg and given aspirin and intro for chest pain. pt states nitro may have helped slightly. pocus performed showing preserved ejection fraction. no sob or pleuritic pain to suggest PE. concern for ACS as pt with sig risk factors. no murmur on exam and ext wwp and no evidence of LE. less likley volume overload. will d/w pt's cardiologist consider ct coronary vs stress testing. less likley dissection as ext wwp and equal BP in both arms

## 2024-01-20 NOTE — ED PROVIDER NOTE - ATTENDING CONTRIBUTION TO CARE
Attending MD Lisette Murillo:  I personally have seen and examined this patient.  Resident note reviewed and agree on plan of care and except where noted.  See HPI, PE, and MDM for details.

## 2024-01-20 NOTE — ED CDU PROVIDER INITIAL DAY NOTE - ATTENDING APP SHARED VISIT CONTRIBUTION OF CARE
Attending MD Murillo:  I have personally performed a face to face diagnostic evaluation on this patient.  I have reviewed the ACP note and agree with the history, exam, and plan of care, except as noted.

## 2024-01-20 NOTE — ED PROVIDER NOTE - PROGRESS NOTE DETAILS
Attending Lisette Murillo: pt's first trop wnl. ekg with nonspecific findings. called pts cardiologist rylie kate, unable to reach and d/w cards fellow who request pt placed in cdu for stress

## 2024-01-20 NOTE — ED PROVIDER NOTE - OBJECTIVE STATEMENT
48F with PMH migraine, fibromyalgia, breast intraductal papilloma w/ focal atypical duct hyperplasia s/p L breast lumpectomy 2/2022 who presents to ED with a 2-day hx of chest pain with radiation down L upper ext.    Pt had visited [facility] earlier today for sore throat for 1 day (no other URI symptoms), tested negative for respiratory panel, prescribed abx for possible strep (pending). There, pt brought up her 2-day hx of chest pain with radiation down L arm. Describes the CP as "heavy pressure" that was overnight assoc with brief shortness of breath and diaphoresis. Pt attributes occasional diaphoresis to her taking Tamoxifen. Per EMS, [facility] found ST changes in leads I and aVL, pt was given a total of aspirin 324 mg today before coming to ED, and SL nitroglycerin, which pt states helped alleviate chest pain. Chest pain is intermittent, denies worsening of chest pain with positional change or respiration. +headache overnight but pt has hx migraine and not unusual. Denies fevers, chills, lightheadedness, dizziness, vision changes, dyspnea, nausea, emesis, abdominal pain. 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    Pt had visited [facility] earlier today for sore throat for 1 day (no other URI symptoms), tested negative for respiratory panel, prescribed abx for possible strep (pending). There, pt brought up her 2-day hx of chest pain with radiation down L arm. Describes the CP as "heavy pressure" that was overnight assoc with brief shortness of breath and diaphoresis. Pt attributes occasional diaphoresis to her taking Tamoxifen. Per EMS, [facility] found ST changes in leads I and aVL, pt was given a total of aspirin 324 mg today before coming to ED, and SL nitroglycerin, which pt states helped alleviate chest pain. Chest pain is intermittent, denies worsening of chest pain with positional change or respiration. +headache overnight but pt has hx migraine and not unusual. Denies fevers, chills, lightheadedness, dizziness, vision changes, dyspnea, nausea, emesis, abdominal pain.

## 2024-01-20 NOTE — ED PROVIDER NOTE - PHYSICAL EXAMINATION
PHYSICAL EXAM:    GENERAL: NAD  HEENT:  Atraumatic, EOMI, PERRL; MMM  CHEST/LUNG: Chest rise equal bilaterally; CTAB, no wheezes/crackles/rhonchi  HEART: Regular rate and rhythm; a grade I-II/VI systolic murmur audible, no rubs or gallops  ABDOMEN: Soft, Nontender, Nondistended  EXTREMITIES:  Extremities warm, no peripheral edema  PSYCH: A&Ox3  SKIN: No obvious rashes or lesions  NEUROLOGY: strength and sensation intact in all extremities. CN 2 - 12 intact. PHYSICAL EXAM:    GENERAL: NAD  HEENT:  Atraumatic, EOMI, PERRL; MMM  CHEST/LUNG: Chest rise equal bilaterally; CTAB, no wheezes/crackles/rhonchi  HEART: Regular rate and rhythm; a grade I-II/VI systolic murmur audible, no rubs or gallops  ABDOMEN: Soft, Nontender, Nondistended  EXTREMITIES:  Extremities warm, no peripheral edema  PSYCH: A&Ox3  SKIN: No obvious rashes or lesions  NEUROLOGY: strength and sensation intact in all extremities. CN 2 - 12 intact.  Attending Lisette Murillo: Gen: NAD, heent: atrauamtic, eomi, perrla, mmm, op pink, uvula midline, neck; nttp, no nuchal rigidity, chest: nttp, no crepitus, cv: rrr, no murmurs, lungs: ctab, abd: soft, nontender, nondistended, no peritoneal signs,  no guarding, ext: wwp, neg homans, skin: no rash, neuro: awake and alert, following commands, speech clear, sensation and strength intact, no focal deficits

## 2024-01-20 NOTE — ED CDU PROVIDER INITIAL DAY NOTE - OBJECTIVE STATEMENT
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 Pt had [place] [facility] [place] sore throat for 1 day (no other URI symptoms), tested negative for respiratory panel, prescribed abx for possible strep (pending). There, pt brought up her 2-day hx of chest pain with radiation down L arm. Describes the CP as "heavy pressure" that was overnight assoc with brief shortness of breath and diaphoresis. Pt attributes occasional diaphoresis to her taking Tamoxifen. Denies fevers, chills, lightheadedness, dizziness, vision changes, dyspnea, nausea, emesis, abdominal pain.

## 2024-01-20 NOTE — ED CDU PROVIDER INITIAL DAY NOTE - DETAILS
CHEST PAIN  -[facility]  -PREETHIMI  -[facility] EVAL  -STRESS TEST  -CASE D/W ATTENDING

## 2024-01-20 NOTE — ED ADULT NURSE NOTE - NSFALLHARMRISKINTERV_ED_ALL_ED

## 2024-01-20 NOTE — ED CDU PROVIDER INITIAL DAY NOTE - PHYSICAL EXAMINATION
PHYSICAL EXAM:    GENERAL: NAD  HEENT:  Atraumatic, EOMI, PERRL; MMM  CHEST/LUNG: Chest rise equal bilaterally; CTAB, no wheezes/crackles/rhonchi  HEART: Regular rate and rhythm; a grade I-II/VI systolic murmur audible, no rubs or gallops  ABDOMEN: Soft, Nontender, Nondistended  EXTREMITIES:  Extremities warm, no peripheral edema  PSYCH: A&Ox3  SKIN: No obvious rashes or lesions  NEUROLOGY: strength and sensation intact in all extremities. CN 2 - 12 intact.

## 2024-01-20 NOTE — ED CDU PROVIDER INITIAL DAY NOTE - NS ED ROS FT
Constitutional: No fever or chills  Eyes: No visual changes, eye pain or redness  HEENT: No throat pain, ear pain, nasal pain. No nose bleeding.  CV: ++chest pain or lower extremity edema  Resp: No SOB no cough  GI: No abd pain. No nausea or vomiting. No diarrhea. No constipation.   : No dysuria, hematuria.   MSK: No musculoskeletal pain  Skin: No rash  Neuro: No headache. No numbness or tingling. No weakness.

## 2024-01-20 NOTE — ED ADULT NURSE NOTE - OBJECTIVE STATEMENT
1301 pt 48yf aox4 bib ems/[facility] c/o sore throat and 2 days of cp, was sent from urgent care w/ ekg changes, pt vss able to verbalize concerns, pending eval/monitor at this time, rsr/

## 2024-01-20 NOTE — ED CDU PROVIDER INITIAL DAY NOTE - CLINICAL SUMMARY MEDICAL DECISION MAKING FREE TEXT BOX
Attending Lisette Murillo: 47 yo female presenting with chest pain. placed in cdu to tele, trending troponins and stress test vs ct coronary. cards consulted

## 2024-01-21 DIAGNOSIS — K76.0 FATTY (CHANGE OF) LIVER, NOT ELSEWHERE CLASSIFIED: ICD-10-CM

## 2024-01-21 DIAGNOSIS — I24.9 ACUTE ISCHEMIC HEART DISEASE, UNSPECIFIED: ICD-10-CM

## 2024-01-21 DIAGNOSIS — E78.1 PURE HYPERGLYCERIDEMIA: ICD-10-CM

## 2024-01-21 DIAGNOSIS — F41.9 ANXIETY DISORDER, UNSPECIFIED: ICD-10-CM

## 2024-01-21 DIAGNOSIS — R07.9 CHEST PAIN, UNSPECIFIED: ICD-10-CM

## 2024-01-21 DIAGNOSIS — Z29.9 ENCOUNTER FOR PROPHYLACTIC MEASURES, UNSPECIFIED: ICD-10-CM

## 2024-01-21 DIAGNOSIS — C50.919 MALIGNANT NEOPLASM OF UNSPECIFIED SITE OF UNSPECIFIED FEMALE BREAST: ICD-10-CM

## 2024-01-21 LAB
A1C WITH ESTIMATED AVERAGE GLUCOSE RESULT: 5.6 % — SIGNIFICANT CHANGE UP (ref 4–5.6)
CHOLEST SERPL-MCNC: 134 MG/DL — SIGNIFICANT CHANGE UP
ESTIMATED AVERAGE GLUCOSE: 114 MG/DL — SIGNIFICANT CHANGE UP (ref 68–114)
HDLC SERPL-MCNC: 24 MG/DL — LOW
LIPID PNL WITH DIRECT LDL SERPL: 29 MG/DL — SIGNIFICANT CHANGE UP
NON HDL CHOLESTEROL: 110 MG/DL — SIGNIFICANT CHANGE UP
TRIGL SERPL-MCNC: 600 MG/DL — HIGH

## 2024-01-21 PROCEDURE — 99222 1ST HOSP IP/OBS MODERATE 55: CPT

## 2024-01-21 PROCEDURE — 93016 CV STRESS TEST SUPVJ ONLY: CPT

## 2024-01-21 PROCEDURE — 99233 SBSQ HOSP IP/OBS HIGH 50: CPT

## 2024-01-21 PROCEDURE — 93018 CV STRESS TEST I&R ONLY: CPT

## 2024-01-21 PROCEDURE — 93306 TTE W/DOPPLER COMPLETE: CPT | Mod: 26

## 2024-01-21 PROCEDURE — 78452 HT MUSCLE IMAGE SPECT MULT: CPT | Mod: 26

## 2024-01-21 RX ORDER — ONABOTULINUMTOXINA 100 UNIT
0 VIAL (EA) INJECTION
Qty: 0 | Refills: 0 | DISCHARGE

## 2024-01-21 RX ORDER — ALPRAZOLAM 0.25 MG
0.5 TABLET ORAL
Refills: 0 | DISCHARGE

## 2024-01-21 RX ORDER — ALBUTEROL 90 UG/1
1 AEROSOL, METERED ORAL
Qty: 0 | Refills: 0 | DISCHARGE

## 2024-01-21 RX ORDER — CHOLECALCIFEROL (VITAMIN D3) 125 MCG
1 CAPSULE ORAL
Qty: 0 | Refills: 0 | DISCHARGE

## 2024-01-21 RX ORDER — ACETAMINOPHEN 500 MG
650 TABLET ORAL EVERY 6 HOURS
Refills: 0 | Status: DISCONTINUED | OUTPATIENT
Start: 2024-01-21 | End: 2024-01-22

## 2024-01-21 RX ORDER — ALPRAZOLAM 0.25 MG
0.25 TABLET ORAL THREE TIMES A DAY
Refills: 0 | Status: DISCONTINUED | OUTPATIENT
Start: 2024-01-21 | End: 2024-01-22

## 2024-01-21 RX ORDER — USTEKINUMAB 45 MG/.5ML
0 INJECTION, SOLUTION SUBCUTANEOUS
Qty: 0 | Refills: 0 | DISCHARGE

## 2024-01-21 RX ORDER — ACETAMINOPHEN 500 MG
975 TABLET ORAL ONCE
Refills: 0 | Status: COMPLETED | OUTPATIENT
Start: 2024-01-21 | End: 2024-01-21

## 2024-01-21 RX ORDER — FLUTICASONE PROPIONATE 50 MCG
1 SPRAY, SUSPENSION NASAL
Qty: 0 | Refills: 0 | DISCHARGE

## 2024-01-21 RX ORDER — ONDANSETRON 8 MG/1
4 TABLET, FILM COATED ORAL EVERY 8 HOURS
Refills: 0 | Status: DISCONTINUED | OUTPATIENT
Start: 2024-01-21 | End: 2024-01-22

## 2024-01-21 RX ORDER — LANOLIN ALCOHOL/MO/W.PET/CERES
3 CREAM (GRAM) TOPICAL AT BEDTIME
Refills: 0 | Status: DISCONTINUED | OUTPATIENT
Start: 2024-01-21 | End: 2024-01-22

## 2024-01-21 RX ORDER — CETIRIZINE HYDROCHLORIDE 10 MG/1
1 TABLET ORAL
Qty: 0 | Refills: 0 | DISCHARGE

## 2024-01-21 RX ORDER — TAMOXIFEN CITRATE 20 MG/1
0 TABLET, FILM COATED ORAL
Qty: 0 | Refills: 0 | DISCHARGE

## 2024-01-21 RX ORDER — ZOLPIDEM TARTRATE 10 MG/1
1 TABLET ORAL
Qty: 0 | Refills: 0 | DISCHARGE

## 2024-01-21 RX ADMIN — Medication 975 MILLIGRAM(S): at 13:56

## 2024-01-21 RX ADMIN — ONDANSETRON 4 MILLIGRAM(S): 8 TABLET, FILM COATED ORAL at 19:58

## 2024-01-21 NOTE — CONSULT NOTE ADULT - ASSESSMENT
48-year-old female history of breast cancer, hypertriglyceridemia, anxiety and psoriasis presents to the ED with chest pain radiating to the left arm. Stress showed: small, mild defects in the distal inferior and distal inferolateral walls that are partially reversible suggestive of mild ischemia with partial scarring. Admit for cath.        Problem/Plan - 1:  ·  Problem: ACS (acute coronary syndrome).   ·  Plan: - Troponin negative  - Stress showed: small, mild defects in the distal inferior and distal inferolateral walls that are partially reversible suggestive of mild ischemia with partial scarring  - TTE nl EF 52%  - Admit to tele for cath 1/22  - AM labs pending      Problem/Plan - 2:  ·  Problem: Hypertriglyceridemia.   ·  Plan: - Elevated triglycerides 600  - Pt reports being on Omega 3s at home   - Should be Dc'd with vascepa.     Problem/Plan - 3:  ·  Problem: NAFLD (nonalcoholic fatty liver disease).   ·  Plan: Mildly elevated LFTs  AST 55 ALT 64  trend.     Problem/Plan - 4:  ·  Problem: Anxiety.   ·  Plan: - Xanax 0.25 tid prn   - See iSTOP.     Problem/Plan - 5:  ·  Problem: Breast cancer.   ·  Plan: Resume tamoxifen 10mg qd post cath.     Problem/Plan - 6:  ·  Problem: Prophylactic measure.   ·  Plan: DVT ppx: OOB as tolerated, SCDs  Diet: DASH TLC NPO at [place]   Dispo pending cath

## 2024-01-21 NOTE — ED CDU PROVIDER SUBSEQUENT DAY NOTE - CLINICAL SUMMARY MEDICAL DECISION MAKING FREE TEXT BOX
48-year-old female history of breast cancer presents to the department with chest pain.  Patient states this morning she is feeling much improved no chest pain no lightheadedness no dizziness no shortness of breath.  On exam patient is awake alert oriented x 3 she has clear lungs auscultation bilaterally 2+ radial and DP pulses.  Patient with no lower leg edema.  Patient has soft abdomen nontender nondistended.  Patient's findings given her history of breast cancer she is pending a stress and echo for her cardiologist recommendation likely outpatient follow-up

## 2024-01-21 NOTE — H&P ADULT - NSHPPHYSICALEXAM_GEN_ALL_CORE
Vital Signs Last 24 Hrs  T(C): 36.7 (21 Jan 2024 15:36), Max: 36.8 (21 Jan 2024 04:38)  T(F): 98.1 (21 Jan 2024 15:36), Max: 98.2 (21 Jan 2024 04:38)  HR: 94 (21 Jan 2024 15:36) (63 - 94)  BP: 125/83 (21 Jan 2024 15:36) (94/60 - 126/72)  BP(mean): 72 (21 Jan 2024 04:38) (72 - 72)  RR: 18 (21 Jan 2024 15:36) (12 - 18)  SpO2: 99% (21 Jan 2024 15:36) (95% - 99%)    Parameters below as of 21 Jan 2024 15:36  Patient On (Oxygen Delivery Method): room air        CONSTITUTIONAL: Well-groomed, in no apparent distress  EYES: No conjunctival or scleral injection  NECK: Trachea midline   RESPIRATORY: Breathing comfortably; lungs CTA without wheeze/rhonchi/rales  CARDIOVASCULAR: +S1S2, RRR,  no lower extremity edema  GASTROINTESTINAL: +BS throughout  SKIN: no noted rashes  NEUROLOGIC: Sensation intact in LEs b/l to light touch  PSYCHIATRIC: A+O x 3

## 2024-01-21 NOTE — H&P ADULT - TIME-BASED
Detail Level: Zone
Treatment Number: 0
Post Peel Care: After the procedure, the treatment area was washed with soap and water, and a post-peel cream was applied. Sun protection and post-care instructions were reviewed with the patient.
Consent: Prior to the procedure, written consent was obtained and risks were reviewed, including but not limited to: redness, peeling, blistering, pigmentary change, scarring, infection, and pain.
Post-Care Instructions: I reviewed with the patient in detail post-care instructions. Patient should avoid sun exposure and wear sun protection.
Frost (0,1+,2+,3+,4+): 1+
Prep: The treated area was degreased with pre-peel cleanser, and vaseline was applied for protection of mucous membranes.
Erythema: mild
Number Of Coats: 3
Chemical Peel: 15% TCA
55

## 2024-01-21 NOTE — ED CDU PROVIDER SUBSEQUENT DAY NOTE - PROGRESS NOTE DETAILS
discussed stress results with Dr. Murray, recommending admission for cath. discussed findings with patient, she Is contacting her family to discuss - Rocio Lloyd PA-C

## 2024-01-21 NOTE — H&P ADULT - ASSESSMENT
48-year-old female history of breast cancer, hypertriglyceridemia, anxiety and psoriasis presents to the ED with chest pain radiating to the left arm. Stress showed: small, mild defects in the distal inferior and distal inferolateral walls that are partially reversible suggestive of mild ischemia with partial scarring. Admit for cath.

## 2024-01-21 NOTE — H&P ADULT - NSICDXPASTSURGICALHX_GEN_ALL_CORE_FT
57-year-old female with a history of COPD, bipolar, chronic back and leg pain, cocaine and heroin abuse, hepatitis B, hypertension, sarcoidosis presents via EMS after bystanders called 911 because she was found face down and unresponsive. On EMS arrival patient had oxygen saturation of 85%, periods of apnea, and pinpoint pupils. EMS gave her 1 mg of intranasal Narcan and her breathing improved but then she had a another period of apnea so she received an additional milligram of Narcan and respiratory status has since normalized. Patient arrives A&O x3 complaining of back pain and nausea. Denies shortness of breath, chest pain. Admits to snorting heroin.   Denies other drug use           Past Medical History:   Diagnosis Date    Asthma     Bipolar 1 disorder (HCC)     Chronic obstructive pulmonary disease (HCC)     Chronic pain     back, leg    Cocaine abuse     Depression     Hepatitis B     Heroin abuse     HTN (hypertension)     Narcotic abuse     Polysubstance abuse     Sarcoidosis     Tobacco abuse        Past Surgical History:   Procedure Laterality Date    HX GYN      HX WISDOM TEETH EXTRACTION           Family History:   Problem Relation Age of Onset    Hypertension Father     Hypertension Mother        Social History     Socioeconomic History    Marital status: SINGLE     Spouse name: Not on file    Number of children: Not on file    Years of education: Not on file    Highest education level: Not on file   Occupational History    Not on file   Social Needs    Financial resource strain: Not on file    Food insecurity     Worry: Not on file     Inability: Not on file    Transportation needs     Medical: Not on file     Non-medical: Not on file   Tobacco Use    Smoking status: Former Smoker     Packs/day: 0.25     Years: 15.00     Pack years: 3.75     Last attempt to quit: 8/3/2017     Years since quitting: 3.1    Smokeless tobacco: Never Used    Tobacco comment: \"I already quit\" Substance and Sexual Activity    Alcohol use: No    Drug use: Yes     Types: Marijuana    Sexual activity: Yes     Partners: Female   Lifestyle    Physical activity     Days per week: Not on file     Minutes per session: Not on file    Stress: Not on file   Relationships    Social connections     Talks on phone: Not on file     Gets together: Not on file     Attends Islam service: Not on file     Active member of club or organization: Not on file     Attends meetings of clubs or organizations: Not on file     Relationship status: Not on file    Intimate partner violence     Fear of current or ex partner: Not on file     Emotionally abused: Not on file     Physically abused: Not on file     Forced sexual activity: Not on file   Other Topics Concern    Not on file   Social History Narrative    Born in [address], 5 children,    No legal charges. Pt graduated from high school. Pt was employed last month at [employer], currently she is unemployed. Pt lives with her mother and 8year old son. She has 4 adult children. ALLERGIES: Tomato    Review of Systems   Constitutional: Positive for activity change. Negative for chills, fever and unexpected weight change. HENT: Negative. Negative for congestion and trouble swallowing. Eyes: Negative for discharge. Respiratory: Negative. Negative for cough, chest tightness and shortness of breath. Cardiovascular: Negative. Negative for chest pain. Gastrointestinal: Positive for nausea. Negative for abdominal distention, abdominal pain, constipation and diarrhea. Endocrine: Negative. Genitourinary: Negative. Negative for difficulty urinating, dysuria, frequency and urgency. Musculoskeletal: Positive for back pain. Negative for arthralgias and myalgias. Skin: Negative. Negative for color change. Allergic/Immunologic: Negative. Neurological: Negative. Negative for dizziness, speech difficulty and headaches. Hematological: Negative. Psychiatric/Behavioral: Negative. Negative for agitation and confusion. All other systems reviewed and are negative. There were no vitals filed for this visit. Physical Exam  Vitals signs and nursing note reviewed. Constitutional:       Appearance: She is well-developed. HENT:      Head: Normocephalic and atraumatic. Eyes:      Conjunctiva/sclera: Conjunctivae normal.   Neck:      Musculoskeletal: Neck supple. Cardiovascular:      Rate and Rhythm: Normal rate and regular rhythm. Pulmonary:      Effort: Pulmonary effort is normal. No respiratory distress. Abdominal:      Palpations: Abdomen is soft. Tenderness: There is no abdominal tenderness. Musculoskeletal: Normal range of motion. General: No deformity. Skin:     General: Skin is warm and dry. Neurological:      Mental Status: She is alert and oriented to person, place, and time. Psychiatric:         Behavior: Behavior normal.         Thought Content: Thought content normal.          MDM  Number of Diagnoses or Management Options  Accidental overdose of heroin, initial encounter [facility]):   Diagnosis management comments: Heroin OD. Narcan was at 2135. Will observe until 12:30 AM.    ED Course as of Sep 11 0037   Fri Sep 11, 2020   0030 No further signs of opioid toxidrome. Now 3 hours after Narcan. Will discharge with Narcan prescription.    [SS]      ED Course User Index  [SS] Michael Lerner MD       Procedures  LABORATORY TESTS:  No results found for this or any previous visit (from the past 12 hour(s)). IMAGING RESULTS:  No orders to display       MEDICATIONS GIVEN:  Medications - No data to display    IMPRESSION:  1. Accidental overdose of heroin, [facility]        PLAN:  1. Discharge Medication List as of 9/11/2020 12:29 AM      START taking these medications    Details   naloxone (Narcan) 4 mg/actuation nasal spray Use 1 spray intranasally, then discard. Repeat with new spray every 2 min as needed for opioid overdose symptoms, alternating nostrils. , Print, Disp-2 Each,R-2         CONTINUE these medications which have NOT CHANGED    Details   !! . PHARMACY TO SUBSTITUTE PER PROTOCOL Take 2 Puffs by inhalation every four (4) hours as needed for Other. Indications: COPD Associated with Chronic Bronchitis, Print, Disp-1 Inhaler, R-0      !! divalproex DR (DEPAKOTE) 500 mg tablet Take 1 Tab by mouth two (2) times a day. Indications: DEPRESSION ASSOCIATED WITH BIPOLAR DISORDER, Print, Disp-60 Tab, R-0      !! . PHARMACY TO SUBSTITUTE PER PROTOCOL Take 1 Puff by inhalation daily. Indications: COPD Associated with Chronic Bronchitis, Print, Disp-1 Inhaler, R-0      lithium carbonate 300 mg tablet Take 1 Tab by mouth two (2) times a day. Indications: DEPRESSION ASSOCIATED WITH BIPOLAR DISORDER, Print, Disp-60 Tab, R-0      !! sertraline (ZOLOFT) 100 mg tablet Take 1 Tab by mouth daily. Indications: Post Traumatic Stress Disorder, Print, Disp-30 Tab, R-0      prazosin (MINIPRESS) 2 mg capsule Take 2 mg by mouth nightly. Indications: Post Traumatic Stress Disorder, Historical Med      !! divalproex DR (DEPAKOTE) 500 mg tablet Take 500 mg by mouth two (2) times a day., Historical Med      !! sertraline (ZOLOFT) 100 mg tablet Take 100 mg by mouth daily. , Historical Med      albuterol sulfate 90 mcg/actuation aepb Take 2 Puffs by inhalation every four (4) hours as needed. , Print, Disp-1 Inhaler, R-0       !! - Potential duplicate medications found. Please discuss with provider.         2.   Follow-up Information     Follow up With Specialties Details Why [id number]  Schedule an appointment as soon as possible for a visit  Raul Orr [address]  [phone]    [facility] EMERGENCY DEPT Emergency Medicine  As needed, If symptoms worsen Roger Diaz  [phone]        Return to ED if worse PAST SURGICAL HISTORY:  Fibroids removal- June 2014    History of bilateral tubal ligation     History of dilation and curettage     History of myomectomy     Lower leg fracture bilateral tib fib fx surgery- 1999    S/P lumpectomy, left breast 2/2022    S/P spinal fusion and laminectomy with hardware- 2009    Uterine polyp lap uterine polyp removal- january 2014

## 2024-01-21 NOTE — H&P ADULT - PROBLEM SELECTOR PLAN 6
DVT ppx: OOB as tolerated, SCDs  Diet: DASH TLC NPO at [place]   Dispo pending cath    Meds need to be reconciled when able to reach pharmacy

## 2024-01-21 NOTE — CHART NOTE - NSCHARTNOTEFT_GEN_A_CORE
A	N	B	12/06/2023	12/09/2023	alprazolam 0.25 mg tablet	60	30	Gaby Turcios	[id number]	Medicare	[id number]  A	N	B	08/23/2023	08/24/2023	alprazolam 0.25 mg tablet	60	30	Gaby Turcios	[id number]	Medicare	[id number]  A	N		08/23/2023	08/24/2023	zolpidem tartrate 5 mg tablet	30	30	Gaby Turcios	[id number]	Medicare	[id number]  A	N		05/31/2023	06/12/2023	zolpidem tartrate 5 mg tablet	30	30	Gaby Turcios	[id number]	Medicare	[id number]  B	Y	B	06/27/2023	01/06/2024	alprazolam 0.25 mg tablet	60	30	Gaby Turcios	[id number]	Insurance	[facility]  B	N	B	05/11/2023	05/13/2023	alprazolam 0.25 mg tablet	60	30	Gaby Turcios	[id number]	Insurance	[facility]

## 2024-01-21 NOTE — ED CDU PROVIDER SUBSEQUENT DAY NOTE - HISTORY
CDU PROGRESS NOTE JESUS RUTHERFORD: no interval change. pt resting comfortably no acute complaint. NAD. VSS. pending stress and TTE in am. Will continue to monitor

## 2024-01-21 NOTE — H&P ADULT - NSHPREVIEWOFSYSTEMS_GEN_ALL_CORE
Review of Systems:   CONSTITUTIONAL: No fever  ENMT:  No sinus or throat pain  RESPIRATORY: No SOB. No cough, wheezing, chills or hemoptysis  CARDIOVASCULAR: No chest pain, palpitations, dizziness, or current leg swelling  GASTROINTESTINAL: No abdominal or epigastric pain.   GENITOURINARY: No dysuria, frequency, hematuria, or incontinence  NEUROLOGICAL: + headache  SKIN: No rashes  MUSCULOSKELETAL: No joint pain or swelling

## 2024-01-21 NOTE — H&P ADULT - TIME BILLING
- Ordering, reviewing, and interpreting labs, testing, and imaging.  - Independently obtaining a review of systems and performing a physical exam  - Discussing plan of care with consultants.  - Counselling and educating patient  regarding interpretation of aforementioned items and plan of care.

## 2024-01-21 NOTE — ED CDU PROVIDER DISPOSITION NOTE - CLINICAL COURSE
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 Pt had visited [facility] earlier today for sore throat for 1 day (no other URI symptoms), tested negative for respiratory panel, prescribed abx for possible strep (pending). There, pt brought up her 2-day hx of chest pain with radiation down L arm. Describes the CP as "heavy pressure" that was overnight assoc with brief shortness of breath and diaphoresis. Pt attributes occasional diaphoresis to her taking Tamoxifen. Denies fevers, chills, lightheadedness, dizziness, vision changes, dyspnea, nausea, emesis, abdominal pain.  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 Pt had visited [facility] earlier today for sore throat for 1 day (no other URI symptoms), tested negative for respiratory panel, prescribed abx for possible strep (pending). There, pt brought up her 2-day hx of chest pain with radiation down L arm. Describes the CP as "heavy pressure" that was overnight assoc with brief shortness of breath and diaphoresis. Pt attributes occasional diaphoresis to her taking Tamoxifen. Denies fevers, chills, lightheadedness, dizziness, vision changes, dyspnea, nausea, emesis, abdominal pain.  in cdu, patient HD stable with no recurrence of symptoms. echo with preserved EF, lipid panel with significant triglyceride elevation, Stress mildly abnormal, case discussed with cardiology Dr. Murray who recommends admission for cath. d/w Dr. Kate

## 2024-01-21 NOTE — H&P ADULT - NSHPLABSRESULTS_GEN_ALL_CORE
12.7   8.46  )-----------( 188      ( 20 Jan 2024 13:15 )             39.1       01-20    138  |  103  |  12  ----------------------------<  96  3.9   |  24  |  0.50    Ca    9.2      20 Jan 2024 13:15  Mg     2.2     01-20    TPro  7.0  /  Alb  4.3  /  TBili  0.4  /  DBili  x   /  AST  55<H>  /  ALT  64<H>  /  AlkPhos  101  01-20      PT/INR - ( 20 Jan 2024 13:15 )   PT: 11.1 sec;   INR: 1.06 ratio         PTT - ( 20 Jan 2024 13:15 )  PTT:27.0 sec            Troponin T, High Sensitivity Result: 8 ng/L (01-20-24 @ 17:10)

## 2024-01-21 NOTE — H&P ADULT - PROBLEM SELECTOR PLAN 1
- Troponin negative  - Stress showed: small, mild defects in the distal inferior and distal inferolateral walls that are partially reversible suggestive of mild ischemia with partial scarring  - TTE nl EF 52%  - Admit to tele for cath 1/22  - AM labs pending   - Cards Dr. Murray aware

## 2024-01-21 NOTE — CONSULT NOTE ADULT - SUBJECTIVE AND OBJECTIVE BOX
MR#[id number]  PATIENT NAME:MARIXA ABREU    DATE OF SERVICE: 01-21-24  Patient was seen and examined by Sergio Murray MD on    01-21-24   Interim events noted.Consultant notes ,Labs,Telemetry reviewed by me       HOSPITAL COURSE: HPI:  48-year-old female history of breast cancer, hypertriglyceridemia, anxiety and psoriasis presents to the ED with chest pain radiating to the left arm. Stress showed: small, mild defects in the distal inferior and distal inferolateral walls that are partially reversible suggestive of mild ischemia with partial scarring. Admit for cath. Pt has no current chest pain, shortness of breath, abdominal pain. She notes that she has a slight headache of which she received Tylenol. She reports being aware of hypertriglyceridemia and takes Omega 3 for it OP. Does not have med list with her.  (21 Jan 2024 17:03)      INTERIM EVENTS:Patient seen at bedside ,interim events noted.      PMH -reviewed admission note, no change since admission  HEART FAILURE: Acute[ ]Chronic[ ] Systolic[ ] Diastolic[ ] Combined Systolic and Diastolic[ ]  CAD[ ] CABG[ ] PCI[ ]  DEVICES[ ] PPM[ ] ICD[ ] ILR[ ]  ATRIAL FIBRILLATION[ ] Paroxysmal[ ] Permanent[ ] CHADS2-[  ]  HAIYL[ ] CKD1[ ] CKD2[ ] CKD3[ ] CKD4[ ] ESRD[ ]  COPD[ ] HTN[ ]   DM[ ] Type1[ ] Type 2[ ]   CVA[ ] Paresis[ ]    AMBULATION: Assisted[ ] Cane/walker[ ] Independent[ ]    MEDICATIONS  (STANDING):    MEDICATIONS  (PRN):  acetaminophen     Tablet .. 650 milliGRAM(s) Oral every 6 hours PRN Temp greater or equal to 38C (100.4F), Mild Pain (1 - 3)  ALPRAZolam 0.25 milliGRAM(s) Oral three times a day PRN anxiety  aluminum hydroxide/magnesium hydroxide/simethicone Suspension 30 milliLiter(s) Oral every 4 hours PRN Dyspepsia  melatonin 3 milliGRAM(s) Oral at bedtime PRN Insomnia  ondansetron Injectable 4 milliGRAM(s) IV Push every 8 hours PRN Nausea and/or Vomiting            REVIEW OF SYSTEMS:  Constitutional: [ ] fever, [ ]weight loss,  [ ]fatigue [ ]weight gain  Eyes: [ ] visual changes  Respiratory: [ ]shortness of breath;  [ ] cough, [ ]wheezing, [ ]chills, [ ]hemoptysis  Cardiovascular: [ ] chest pain, [ ]palpitations, [ ]dizziness,  [ ]leg swelling[ ]orthopnea[ ]PND  Gastrointestinal: [ ] abdominal pain, [ ]nausea, [ ]vomiting,  [ ]diarrhea [ ]Constipation [ ]Melena  Genitourinary: [ ] dysuria, [ ] hematuria [ ]Farley  Neurologic: [ ] headaches [ ] tremors[ ]weakness [ ]Paralysis Right[ ] Left[ ]  Skin: [ ] itching, [ ]burning, [ ] rashes  Endocrine: [ ] heat or cold intolerance  Musculoskeletal: [ ] joint pain or swelling; [ ] muscle, back, or extremity pain  Psychiatric: [ ] depression, [ ]anxiety, [ ]mood swings, or [ ]difficulty sleeping  Hematologic: [ ] easy bruising, [ ] bleeding gums    [ ] All remaining systems negative except as per above.   [ ]Unable to obtain.  [x] No change in ROS since admission      Vital Signs Last 24 Hrs  T(C): 36.7 (21 Jan 2024 19:45), Max: 36.8 (21 Jan 2024 04:38)  T(F): 98.1 (21 Jan 2024 19:45), Max: 98.2 (21 Jan 2024 04:38)  HR: 94 (21 Jan 2024 19:45) (63 - 94)  BP: 127/90 (21 Jan 2024 19:45) (94/60 - 127/90)  BP(mean): 72 (21 Jan 2024 04:38) (72 - 72)  RR: 16 (21 Jan 2024 19:45) (12 - 18)  SpO2: 98% (21 Jan 2024 19:45) (95% - 99%)    Parameters below as of 21 Jan 2024 19:45  Patient On (Oxygen Delivery Method): room air      I&O's Summary      PHYSICAL EXAM:  General: No acute distress BMI-  HEENT: EOMI, PERRL  Neck: Supple, [ ] JVD  Lungs: Equal air entry bilaterally; [ ] rales [ ] wheezing [ ] rhonchi  Heart: Regular rate and rhythm; [x ] murmur   2/6 [ x] systolic [ ] diastolic [ ] radiation[ ] rubs [ ]  gallops  Abdomen: Nontender, bowel sounds present  Extremities: No clubbing, cyanosis, [ ] edema [ ]Pulses  equal and intact  Nervous system:  Alert & Oriented X3, no focal deficits  Psychiatric: Normal affect  Skin: No rashes or lesions    LABS:  01-20    138  |  103  |  12  ----------------------------<  96  3.9   |  24  |  0.50    Ca    9.2      20 Jan 2024 13:15  Mg     2.2     01-20    TPro  7.0  /  Alb  4.3  /  TBili  0.4  /  DBili  x   /  AST  55<H>  /  ALT  64<H>  /  AlkPhos  101  01-20    Creatinine Trend: 0.50<--                        12.7   8.46  )-----------( 188      ( 20 Jan 2024 13:15 )             39.1     PT/INR - ( 20 Jan 2024 13:15 )   PT: 11.1 sec;   INR: 1.06 ratio         PTT - ( 20 Jan 2024 13:15 )  PTT:27.0 sec

## 2024-01-21 NOTE — CONSULT NOTE ADULT - TIME BILLING
- Review of records, telemetry, vital signs and daily labs.   - General and cardiovascular physical examination.  - Generation of cardiovascular treatment plan.  - Coordination of care.      Patient was seen and examined by me on 1/21/24,interim events noted,labs and radiology studies reviewed.  Sergio Murray MD,FACC.  [address] [place], [place] [id number].  [phone]

## 2024-01-21 NOTE — H&P ADULT - PROBLEM SELECTOR PLAN 2
- Elevated triglycerides 600  - Pt reports being on Omega 3s at home   - Should be Dc'd with vascepa

## 2024-01-21 NOTE — ED CDU PROVIDER DISPOSITION NOTE - ATTENDING CONTRIBUTION TO CARE
48-year-old female history of breast cancer presents to the department with chest pain.  Patient states this morning she is feeling much improved no chest pain no lightheadedness no dizziness no shortness of breath.  On exam patient is awake alert oriented x 3 she has clear lungs auscultation bilaterally 2+ radial and DP pulses.  Patient with no lower leg edema.  Patient has soft abdomen nontender nondistended.  Patient's findings given her history of breast cancer she is pending a stress and echo for her cardiologist found to have abnormalities on stress pt to be admitted for further management of sx

## 2024-01-21 NOTE — H&P ADULT - HISTORY OF PRESENT ILLNESS
48-year-old female history of breast cancer, hypertriglyceridemia, anxiety and psoriasis presents to the ED with chest pain radiating to the left arm. Stress showed: small, mild defects in the distal inferior and distal inferolateral walls that are partially reversible suggestive of mild ischemia with partial scarring. Admit for cath. Pt has no current chest pain, shortness of breath, abdominal pain. She notes that she has a slight headache of which she received Tylenol. She reports being aware of hypertriglyceridemia and takes Omega 3 for it OP. Does not have med list with her.

## 2024-01-22 ENCOUNTER — TRANSCRIPTION ENCOUNTER (OUTPATIENT)
Age: 49
End: 2024-01-22

## 2024-01-22 VITALS
DIASTOLIC BLOOD PRESSURE: 73 MMHG | RESPIRATION RATE: 17 BRPM | SYSTOLIC BLOOD PRESSURE: 109 MMHG | TEMPERATURE: 98 F | OXYGEN SATURATION: 98 % | HEART RATE: 89 BPM

## 2024-01-22 LAB
ALBUMIN SERPL ELPH-MCNC: 3.8 G/DL — SIGNIFICANT CHANGE UP (ref 3.3–5)
ALP SERPL-CCNC: 85 U/L — SIGNIFICANT CHANGE UP (ref 40–120)
ALT FLD-CCNC: 53 U/L — HIGH (ref 10–45)
ANION GAP SERPL CALC-SCNC: 12 MMOL/L — SIGNIFICANT CHANGE UP (ref 5–17)
AST SERPL-CCNC: 41 U/L — HIGH (ref 10–40)
BILIRUB SERPL-MCNC: 0.3 MG/DL — SIGNIFICANT CHANGE UP (ref 0.2–1.2)
BUN SERPL-MCNC: 14 MG/DL — SIGNIFICANT CHANGE UP (ref 7–23)
CALCIUM SERPL-MCNC: 8.9 MG/DL — SIGNIFICANT CHANGE UP (ref 8.4–10.5)
CHLORIDE SERPL-SCNC: 106 MMOL/L — SIGNIFICANT CHANGE UP (ref 96–108)
CO2 SERPL-SCNC: 23 MMOL/L — SIGNIFICANT CHANGE UP (ref 22–31)
CREAT SERPL-MCNC: 0.54 MG/DL — SIGNIFICANT CHANGE UP (ref 0.5–1.3)
EGFR: 113 ML/MIN/1.73M2 — SIGNIFICANT CHANGE UP
GLUCOSE SERPL-MCNC: 115 MG/DL — HIGH (ref 70–99)
HCG UR QL: NEGATIVE — SIGNIFICANT CHANGE UP
HCT VFR BLD CALC: 38.3 % — SIGNIFICANT CHANGE UP (ref 34.5–45)
HGB BLD-MCNC: 12.1 G/DL — SIGNIFICANT CHANGE UP (ref 11.5–15.5)
MAGNESIUM SERPL-MCNC: 2.2 MG/DL — SIGNIFICANT CHANGE UP (ref 1.6–2.6)
MCHC RBC-ENTMCNC: 28.9 PG — SIGNIFICANT CHANGE UP (ref 27–34)
MCHC RBC-ENTMCNC: 31.6 GM/DL — LOW (ref 32–36)
MCV RBC AUTO: 91.6 FL — SIGNIFICANT CHANGE UP (ref 80–100)
NRBC # BLD: 0 /100 WBCS — SIGNIFICANT CHANGE UP (ref 0–0)
PLATELET # BLD AUTO: 191 K/UL — SIGNIFICANT CHANGE UP (ref 150–400)
POTASSIUM SERPL-MCNC: 4.1 MMOL/L — SIGNIFICANT CHANGE UP (ref 3.5–5.3)
POTASSIUM SERPL-SCNC: 4.1 MMOL/L — SIGNIFICANT CHANGE UP (ref 3.5–5.3)
PROT SERPL-MCNC: 6.2 G/DL — SIGNIFICANT CHANGE UP (ref 6–8.3)
RBC # BLD: 4.18 M/UL — SIGNIFICANT CHANGE UP (ref 3.8–5.2)
RBC # FLD: 13 % — SIGNIFICANT CHANGE UP (ref 10.3–14.5)
SODIUM SERPL-SCNC: 141 MMOL/L — SIGNIFICANT CHANGE UP (ref 135–145)
WBC # BLD: 6.14 K/UL — SIGNIFICANT CHANGE UP (ref 3.8–10.5)
WBC # FLD AUTO: 6.14 K/UL — SIGNIFICANT CHANGE UP (ref 3.8–10.5)

## 2024-01-22 PROCEDURE — 83735 ASSAY OF MAGNESIUM: CPT

## 2024-01-22 PROCEDURE — 83690 ASSAY OF LIPASE: CPT

## 2024-01-22 PROCEDURE — G0378: CPT

## 2024-01-22 PROCEDURE — 80053 COMPREHEN METABOLIC PANEL: CPT

## 2024-01-22 PROCEDURE — C1894: CPT

## 2024-01-22 PROCEDURE — 87637 SARSCOV2&INF A&B&RSV AMP PRB: CPT

## 2024-01-22 PROCEDURE — 83036 HEMOGLOBIN GLYCOSYLATED A1C: CPT

## 2024-01-22 PROCEDURE — 93454 CORONARY ARTERY ANGIO S&I: CPT

## 2024-01-22 PROCEDURE — 85610 PROTHROMBIN TIME: CPT

## 2024-01-22 PROCEDURE — 36415 COLL VENOUS BLD VENIPUNCTURE: CPT

## 2024-01-22 PROCEDURE — C1887: CPT

## 2024-01-22 PROCEDURE — 85027 COMPLETE CBC AUTOMATED: CPT

## 2024-01-22 PROCEDURE — 71045 X-RAY EXAM CHEST 1 VIEW: CPT

## 2024-01-22 PROCEDURE — 93306 TTE W/DOPPLER COMPLETE: CPT

## 2024-01-22 PROCEDURE — 99152 MOD SED SAME PHYS/QHP 5/>YRS: CPT

## 2024-01-22 PROCEDURE — 81025 URINE PREGNANCY TEST: CPT

## 2024-01-22 PROCEDURE — 99285 EMERGENCY DEPT VISIT HI MDM: CPT

## 2024-01-22 PROCEDURE — 78452 HT MUSCLE IMAGE SPECT MULT: CPT | Mod: MA

## 2024-01-22 PROCEDURE — C1769: CPT

## 2024-01-22 PROCEDURE — 80061 LIPID PANEL: CPT

## 2024-01-22 PROCEDURE — 85730 THROMBOPLASTIN TIME PARTIAL: CPT

## 2024-01-22 PROCEDURE — 93010 ELECTROCARDIOGRAM REPORT: CPT

## 2024-01-22 PROCEDURE — 86140 C-REACTIVE PROTEIN: CPT

## 2024-01-22 PROCEDURE — 84484 ASSAY OF TROPONIN QUANT: CPT

## 2024-01-22 PROCEDURE — 93454 CORONARY ARTERY ANGIO S&I: CPT | Mod: 26

## 2024-01-22 PROCEDURE — 93005 ELECTROCARDIOGRAM TRACING: CPT

## 2024-01-22 PROCEDURE — 85025 COMPLETE CBC W/AUTO DIFF WBC: CPT

## 2024-01-22 PROCEDURE — 93308 TTE F-UP OR LMTD: CPT

## 2024-01-22 PROCEDURE — 83880 ASSAY OF NATRIURETIC PEPTIDE: CPT

## 2024-01-22 PROCEDURE — 93017 CV STRESS TEST TRACING ONLY: CPT

## 2024-01-22 PROCEDURE — A9500: CPT

## 2024-01-22 RX ORDER — ACETAMINOPHEN 500 MG
2 TABLET ORAL
Qty: 0 | Refills: 0 | DISCHARGE
Start: 2024-01-22

## 2024-01-22 RX ORDER — SODIUM CHLORIDE 9 MG/ML
1000 INJECTION INTRAMUSCULAR; INTRAVENOUS; SUBCUTANEOUS
Refills: 0 | Status: COMPLETED | OUTPATIENT
Start: 2024-01-22 | End: 2024-01-22

## 2024-01-22 RX ADMIN — ONDANSETRON 4 MILLIGRAM(S): 8 TABLET, FILM COATED ORAL at 15:03

## 2024-01-22 RX ADMIN — SODIUM CHLORIDE 75 MILLILITER(S): 9 INJECTION INTRAMUSCULAR; INTRAVENOUS; SUBCUTANEOUS at 14:15

## 2024-01-22 NOTE — DISCHARGE NOTE PROVIDER - NSDCFUSCHEDAPPT_GEN_ALL_CORE_FT
Benito Shea  [facility]  Scheduled Appointment: 01/24/2024    [facility]  UROLOGY [address]  Scheduled Appointment: 01/24/2024    Sonja Arce  [facility]  UROLOGY [address]  Scheduled Appointment: 01/24/2024    Allegra Montgomery  [facility]  NEUROLOGY [address]  Scheduled Appointment: 01/29/2024    Aura Cordero  [facility] Physician [facility]  [facility]  Scheduled Appointment: 03/25/2024

## 2024-01-22 NOTE — DISCHARGE NOTE PROVIDER - NSDCCPCAREPLAN_GEN_ALL_CORE_FT
PRINCIPAL DISCHARGE DIAGNOSIS  Diagnosis: Acute chest pain  Assessment and Plan of Treatment: Low salt, low fat diet.   Weight management.   Take medications as prescribed.    No smoking.  Follow up appointments with your doctor(s)  as instruced.      SECONDARY DISCHARGE DIAGNOSES  Diagnosis: Mixed hypertriglyceridemia  Assessment and Plan of Treatment: continue with home meds

## 2024-01-22 NOTE — PROGRESS NOTE ADULT - TIME BILLING
- Review of records, telemetry, vital signs and daily labs.   - General and cardiovascular physical examination.  - Generation of cardiovascular treatment plan.  - Coordination of care.      Patient was seen and examined by me on  01/22/2024,interim events noted,labs and radiology studies reviewed.  Sergio Murray MD,FACC.  [address] [place], [place] [id number].  [phone]

## 2024-01-22 NOTE — DISCHARGE NOTE NURSING/CASE MANAGEMENT/SOCIAL WORK - NSDCPEFALRISK_GEN_ALL_CORE
For information on Fall & Injury Prevention, visit: [email or web address] OR  [email or web address] OR  https://www.cdc.gov/steadi/patient.html

## 2024-01-22 NOTE — ED ADULT NURSE REASSESSMENT NOTE - NS ED NURSE REASSESS COMMENT FT1
[id number] Break coverage for CDU Rn Pt returned from Stress unable to do test Pt given meds and will eat and go back [place] provided and drink  Murray
pt reports nausea improved.
Pt received from MANI Pearson. Pt oriented to CDU & plan of care was discussed. Pt A&O x 4. Pt in CDU for tele, stress test. Pt denies any chest pain, SOB, dizziness or palpitations as per pt. Pt on cardiac monitor in NSR, HR in 80's. V/S stable, pt afebrile,  IV in place, patent and free of signs of infiltration. Pt resting in bed. Safety & comfort measures maintained. Call bell in reach. Will continue to monitor.
07.00 Received the Pt from  BRANDON Stephenson . Pt is Observed for CP with positive stress test  for Angiogram today  Pt is admitted & NPO from last night . Received the Pt A&OX 4  Pt obeys commands Debbi N/V/D fever chills cp SOB   Comfort care & safety measures continued  IV site looks clean & dry no signs of infiltration noted pt denies  pain IV site .Pt is oriented to the unit Plan of care explained .  Pt is advised to call for help  call bell with in the reach pt verbalized the understanding .  pending CDU  MD ocampo . GCS 15/15 A&OX 4 PERRLA  size 3 Strong upper & lower extremities steady gait  Pt is ambulatory & independent   No facial droop  No Hand Leg drop denies numbness tingling  Plan of care ongoing    2290 Report given to Brandon Mcqueen in [facility] Pt stable to go to floor
Report taken from MANI Harp. Pt introduced to oncoming CDU RN and updated on plan of care. Call bell in reach, pt educated on use. Bed locked and in lowest position. C/o mild nausea. Offered and accepted zofran. Denies any other needs or complaints at this time. Aware of NPO status after midnight and awaiting inpatient bed assignment.

## 2024-01-22 NOTE — PROGRESS NOTE ADULT - ASSESSMENT
48-year-old female history of breast cancer, hypertriglyceridemia, anxiety and psoriasis presents to the ED with chest pain radiating to the left arm      # ACS (acute coronary syndrome).   ·  Plan: - Troponin negative  - Stress showed: small, mild defects in the distal inferior and distal inferolateral walls that are partially reversible suggestive of mild ischemia with partial scarring  - TTE nl EF 52%  - Admit to tele for cath today    #Hypertriglyceridemia.   ·  Plan: - Elevated triglycerides 600  - Pt reports being on Garfield 3s at home     # NAFLD (nonalcoholic fatty liver disease).   ·  Plan: Mildly elevated LFTs  AST 55 ALT 64    #Anxiety.   ·  Plan: - Xanax 0.25 tid prn

## 2024-01-22 NOTE — DISCHARGE NOTE PROVIDER - CARE PROVIDER_API CALL
Sergio Murray  Cardiology  [address] [place] [address]  Phone: [phone]  Fax: [phone]  Follow Up Time:    Ever Robins  Cardiovascular Disease  [address] [place] [address]  Phone: [phone]  Fax: [phone]  Follow Up Time:

## 2024-01-22 NOTE — DISCHARGE NOTE NURSING/CASE MANAGEMENT/SOCIAL WORK - PATIENT PORTAL LINK FT
You can access the FollowMyHealth Patient Portal offered by [facility] by registering at the following website: [email or web address]. By joining [employer]’s FollowMyHealth portal, you will also be able to view your health information using other applications (apps) compatible with our system.

## 2024-01-22 NOTE — DISCHARGE NOTE PROVIDER - NSDCCPTREATMENT_GEN_ALL_CORE_FT
PRINCIPAL PROCEDURE  Procedure: Left heart cardiac cath  Findings and Treatment: diagnostic non obstructiveNo heavy lifting for 2 weeks, no strenuous activity  ( pushing/ pulling) no driving for x 2 days,  you may shower 24 hours following procedure but no bathing or swimming for x1  week, no strenuous sex for x 1 week & follow up with your cardiologist in 1-2 week

## 2024-01-22 NOTE — ED ADULT NURSE REASSESSMENT NOTE - NSFALLUNIVINTERV_ED_ALL_ED
Bed/Stretcher in lowest position, wheels locked, appropriate side rails in place/Call bell, personal items and telephone in reach/Instruct patient to call for assistance before getting out of bed/chair/stretcher/Non-slip footwear applied when patient is off stretcher/[place] to call system/Physically safe environment - no spills, clutter or unnecessary equipment/Purposeful proactive rounding/Room/bathroom lighting operational, light cord in reach

## 2024-01-22 NOTE — DISCHARGE NOTE PROVIDER - HOSPITAL COURSE
48-year-old female history of breast cancer, hypertriglyceridemia, anxiety and psoriasis presents to the ED with chest pain radiating to the left arm. Stress showed: small, mild defects in the distal inferior and distal inferolateral walls that are partially reversible suggestive of mild ischemia with partial scarring. Admit for cath. Pt has no current chest pain, shortness of breath, abdominal pain. She notes that she has a slight headache of which she received Tylenol. She reports being aware of hypertriglyceridemia and takes Omega 3 for it OP. Does not have med list with her.   < from: Cardiac Catheterization (01.22.24 @ 12:50) >Diagnostic Conclusions:   Mild non obstructive CAD.          48-year-old female history of breast cancer, hypertriglyceridemia, anxiety and psoriasis presents to the ED with chest pain radiating to the left arm. Stress showed: small, mild defects in the distal inferior and distal inferolateral walls that are partially reversible suggestive of mild ischemia with partial scarring. Admit for cath. Pt has no current chest pain, shortness of breath, abdominal pain. She notes that she has a slight headache of which she received Tylenol. She reports being aware of hypertriglyceridemia and takes Omega 3 for it OP. Does not have med list with her.   < from: Cardiac Catheterization (01.22.24 @ 12:50) >Diagnostic Conclusions:   Mild non obstructive CAD.   < from: Nuclear Stress Test-Exercise.. (01.21.24 @ 10:46) >     1. Myocardial Perfusion: Abnormal.   2. Switch to Pharmcologic Protocol: Patient completed 7:33 min of Claus protocol stopping due to fatigue and shortness of breath at a HR of 123 bpm. Test switched to pharmacologic.   3. Perfusion Findings:      --There are small, mild defects in the distal inferior and distal inferolateral walls that are partially reversible suggestive of mild ischemia with partial scarring.      --There are small, mild defects in the basal inferior and basal septal walls that are mostly fixed suggestive of scar with minimal ischemia.   4. The left ventricle is small in size. The left ventricular EF% during stress is 66 %. The stress end diastolic volume is 49 ml and systolic volume is 17 ml.   5. Post-stress gated wall motion analysis revealed reduced systolic thickening of the basal and distal inferior, distal inferolateral, and basal septal walls. Overall left ventricular ejection fraction is normal.      < end of copied text >    < from: TTE W or WO Ultrasound Enhancing Agent (01.21.24 @ 07:52) >    CONCLUSIONS:      1. Left ventricular cavity is small. Left ventricular wall thickness is normal. Left ventricular systolic function is normal with an ejection fraction of 52 % by Mchugh's method of disks.   2. Normal leftventricular diastolic function, with normal filling pressure.   3. Normal right ventricular cavity size, wall thickness, and normal systolic function.   4. Normal left and right atrial size.   5. No significant valvular disease.   6. No pericardial effusion seen.   7. No prior echocardiogram is available for comparison.      < end of copied text >

## 2024-01-22 NOTE — DISCHARGE NOTE PROVIDER - NSDCMRMEDTOKEN_GEN_ALL_CORE_FT
4life transfactor: 2 tab(s) orally once a day  acetaminophen 325 mg oral tablet: 2 tab(s) orally every 6 hours As needed Temp greater or equal to 38C (100.4F), Mild Pain (1 - 3)  Botox: every 3 months for migraine  Stelara 45 mg/0.5 mL subcutaneous solution: every 3 months  tamoxifen 10 mg oral tablet: orally once a day  Xanax 0.5 mg oral tablet: 0.5 tab(s) orally 3 times a day as needed for -

## 2024-01-22 NOTE — DISCHARGE NOTE PROVIDER - NSDCFUADDINST_GEN_ALL_CORE_FT
No heavy lifting for 2 weeks, no strenuous activity  ( pushing/ pulling) no driving for x 2 days,  you may shower 24 hours following procedure but no bathing or swimming for x1  week, no strenuous sex for x 1 week & follow up with your cardiologist in 1-2 week

## 2024-01-22 NOTE — PROGRESS NOTE ADULT - SUBJECTIVE AND OBJECTIVE BOX
MR#[id number]  PATIENT NAME:MARIXA ABREU    DATE OF SERVICE: 01-22-24 @ 06:59  Patient was seen and examined by Sergio Murray MD on    01-22-24 @ 06:59 .  Interim events noted.Consultant notes ,Labs,Telemetry reviewed by me       HOSPITAL COURSE: HPI:  48-year-old female history of breast cancer, hypertriglyceridemia, anxiety and psoriasis presents to the ED with chest pain radiating to the left arm. Stress showed: small, mild defects in the distal inferior and distal inferolateral walls that are partially reversible suggestive of mild ischemia with partial scarring. Admit for cath. Pt has no current chest pain, shortness of breath, abdominal pain. She notes that she has a slight headache of which she received Tylenol. She reports being aware of hypertriglyceridemia and takes Omega 3 for it OP. Does not have med list with her.  (21 Jan 2024 17:03)      INTERIM EVENTS:Patient seen at bedside ,interim events noted.Awake alert no chest pain or dyspnea remains in sinus rhythm      PMH -reviewed admission note, no change since admission  HEART FAILURE: Acute[ ]Chronic[ ] Systolic[ ] Diastolic[ ] Combined Systolic and Diastolic[ ]  CAD[ ] CABG[ ] PCI[ ]  DEVICES[ ] PPM[ ] ICD[ ] ILR[ ]  ATRIAL FIBRILLATION[ ] Paroxysmal[ ] Permanent[ ] CHADS2-[  ]  HAILY[ ] CKD1[ ] CKD2[ ] CKD3[ ] CKD4[ ] ESRD[ ]  COPD[ ] HTN[ ]   DM[ ] Type1[ ] Type 2[ ]   CVA[ ] Paresis[ ]    AMBULATION: Assisted[ ] Cane/walker[ ] Independent[x ]    MEDICATIONS  (STANDING):    MEDICATIONS  (PRN):  acetaminophen     Tablet .. 650 milliGRAM(s) Oral every 6 hours PRN Temp greater or equal to 38C (100.4F), Mild Pain (1 - 3)  ALPRAZolam 0.25 milliGRAM(s) Oral three times a day PRN anxiety  aluminum hydroxide/magnesium hydroxide/simethicone Suspension 30 milliLiter(s) Oral every 4 hours PRN Dyspepsia  melatonin 3 milliGRAM(s) Oral at bedtime PRN Insomnia  ondansetron Injectable 4 milliGRAM(s) IV Push every 8 hours PRN Nausea and/or Vomiting            REVIEW OF SYSTEMS:  Constitutional: [ ] fever, [ ]weight loss,  [x ]fatigue [ ]weight gain  Eyes: [ ] visual changes  Respiratory: [ ]shortness of breath;  [ ] cough, [ ]wheezing, [ ]chills, [ ]hemoptysis  Cardiovascular: [x ] chest pain, [ ]palpitations, [ ]dizziness,  [ ]leg swelling[ ]orthopnea[ ]PND  Gastrointestinal: [ ] abdominal pain, [ ]nausea, [ ]vomiting,  [ ]diarrhea [ ]Constipation [ ]Melena  Genitourinary: [ ] dysuria, [ ] hematuria [ ][place]ley  Neurologic: [ ] headaches [ ] tremors[ ]weakness [ ]Paralysis Right[ ] Left[ ]  Skin: [ ] itching, [ ]burning, [ ] rashes  Endocrine: [ ] heat or cold intolerance  Musculoskeletal: [ ] joint pain or swelling; [ ] muscle, back, or extremity pain  Psychiatric: [ ] depression, [ ]anxiety, [ ]mood swings, or [ ]difficulty sleeping  Hematologic: [ ] easy bruising, [ ] bleeding gums    [ ] All remaining systems negative except as per above.   [ ]Unable to obtain.  [x] No change in ROS since admission      Vital Signs Last 24 Hrs  T(C): 36.6 (22 Jan 2024 03:00), Max: 36.7 (21 Jan 2024 08:11)  T(F): 97.8 (22 Jan 2024 03:00), Max: 98.1 (21 Jan 2024 15:36)  HR: 70 (22 Jan 2024 03:00) (70 - 94)  BP: 99/68 (22 Jan 2024 03:00) (99/68 - 127/90)  RR: 16 (22 Jan 2024 03:00) (12 - 18)  SpO2: 96% (22 Jan 2024 03:00) (96% - 99%)    Parameters below as of 22 Jan 2024 03:00  Patient On (Oxygen Delivery Method): room air      I&O's Summary      PHYSICAL EXAM:  General: No acute distress BMI-28  HEENT: EOMI, PERRL  Neck: Supple, [ ] JVD  Lungs: Equal air entry bilaterally; [ ] rales [ ] wheezing [ ] rhonchi  Heart: Regular rate and rhythm; [x ] murmur   2/6 [ x] systolic [ ] diastolic [ ] radiation[ ] rubs [ ]  gallops  Abdomen: Nontender, bowel sounds present  Extremities: No clubbing, cyanosis, [ ] edema [ ]Pulses  equal and intact  Nervous system:  Alert & Oriented X3, no focal deficits  Psychiatric: Normal affect  Skin: No rashes or lesions    LABS:  01-22    141  |  106  |  14  ----------------------------<  115<H>  4.1   |  23  |  0.54    Ca    8.9      22 Jan 2024 05:52  Mg     2.2     01-20    TPro  6.2  /  Alb  3.8  /  TBili  0.3  /  DBili  x   /  AST  41<H>  /  ALT  53<H>  /  AlkPhos  85  01-22    Creatinine Trend: 0.54<--, 0.50<--                        12.1   6.14  )-----------( 191      ( 22 Jan 2024 05:52 )             38.3     PT/INR - ( 20 Jan 2024 13:15 )   PT: 11.1 sec;   INR: 1.06 ratio    PTT - ( 20 Jan 2024 13:15 )  PTT:27.0 sec          12 Lead ECG (01.20.24 @ 12:54) >   NORMAL SINUS RHYTHM AT 75 BPM  NO ACUTE ST T WAVE ABNORMALITIES       TTE W or WO Ultrasound Enhancing Agent (01.21.24 @ 07:52) >  CONCLUSIONS:      1. Left ventricular cavity is small. Left ventricular wall thickness is normal. Left ventricular systolic function is normal with an ejection fraction of 52 % by Mchugh's method of disks.   2. Normal leftventricular diastolic function, with normal filling pressure.   3. Normal right ventricular cavity size, wall thickness, and normal systolic function.   4. Normal left and right atrial size.   5. No significant valvular disease.   6. No pericardial effusion seen.   7. No prior echocardiogram is available for comparison.         Nuclear Stress Test-Exercise.. (01.21.24 @ 10:46) >  Conclusions:   1. Myocardial Perfusion: Abnormal.   2. Switch to Pharmcologic Protocol: Patient completed 7:33 min of Claus protocol stopping due to fatigue and shortness of breath at a HR of 123 bpm. Test switched to pharmacologic.   3. Perfusion Findings:      --There are small, mild defects in the distal inferior and distal inferolateral walls that are partially reversible suggestive of mild ischemia with partial scarring.      --There are small, mild defects in the basal inferior and basal septal walls that are mostly fixed suggestive of scar with minimal ischemia.   4. The left ventricle is small in size. The left ventricular EF% during stress is 66 %. The stress end diastolic volume is 49 ml and systolic volume is 17 ml.   5. Post-stress gated wall motion analysis revealed reduced systolic thickening of the basal and distal inferior, distal inferolateral, and basal septal walls. Overall left ventricular ejection fraction is normal.

## 2024-01-24 ENCOUNTER — APPOINTMENT (OUTPATIENT)
Dept: UROLOGY | Facility: CLINIC | Age: 49
End: 2024-01-24

## 2024-02-01 ENCOUNTER — APPOINTMENT (OUTPATIENT)
Dept: NEUROLOGY | Facility: CLINIC | Age: 49
End: 2024-02-01
Payer: MEDICARE

## 2024-02-01 ENCOUNTER — EMERGENCY (EMERGENCY)
Facility: HOSPITAL | Age: 49
LOS: 1 days | Discharge: HOME | End: 2024-02-01
Attending: EMERGENCY MEDICINE
Payer: MEDICARE

## 2024-02-01 VITALS
SYSTOLIC BLOOD PRESSURE: 143 MMHG | TEMPERATURE: 98 F | RESPIRATION RATE: 18 BRPM | DIASTOLIC BLOOD PRESSURE: 95 MMHG | HEART RATE: 80 BPM | OXYGEN SATURATION: 97 %

## 2024-02-01 VITALS
WEIGHT: 115 LBS | HEART RATE: 78 BPM | SYSTOLIC BLOOD PRESSURE: 122 MMHG | HEIGHT: 58 IN | DIASTOLIC BLOOD PRESSURE: 85 MMHG | BODY MASS INDEX: 24.14 KG/M2

## 2024-02-01 VITALS
RESPIRATION RATE: 18 BRPM | DIASTOLIC BLOOD PRESSURE: 87 MMHG | HEIGHT: 59 IN | OXYGEN SATURATION: 97 % | WEIGHT: 115.08 LBS | TEMPERATURE: 98 F | HEART RATE: 88 BPM | SYSTOLIC BLOOD PRESSURE: 123 MMHG

## 2024-02-01 DIAGNOSIS — Z98.1 ARTHRODESIS STATUS: Chronic | ICD-10-CM

## 2024-02-01 DIAGNOSIS — Z98.890 OTHER SPECIFIED POSTPROCEDURAL STATES: Chronic | ICD-10-CM

## 2024-02-01 DIAGNOSIS — S82.90XA UNSPECIFIED FRACTURE OF UNSPECIFIED LOWER LEG, INITIAL ENCOUNTER FOR CLOSED FRACTURE: Chronic | ICD-10-CM

## 2024-02-01 DIAGNOSIS — D25.9 LEIOMYOMA OF UTERUS, UNSPECIFIED: Chronic | ICD-10-CM

## 2024-02-01 DIAGNOSIS — N84.0 POLYP OF CORPUS UTERI: Chronic | ICD-10-CM

## 2024-02-01 DIAGNOSIS — Z98.51 TUBAL LIGATION STATUS: Chronic | ICD-10-CM

## 2024-02-01 PROCEDURE — 64615 CHEMODENERV MUSC MIGRAINE: CPT

## 2024-02-01 PROCEDURE — 93931 UPPER EXTREMITY STUDY: CPT

## 2024-02-01 PROCEDURE — 99215 OFFICE O/P EST HI 40 MIN: CPT | Mod: 25

## 2024-02-01 PROCEDURE — 93931 UPPER EXTREMITY STUDY: CPT | Mod: 26,RT

## 2024-02-01 PROCEDURE — 99284 EMERGENCY DEPT VISIT MOD MDM: CPT | Mod: 25

## 2024-02-01 PROCEDURE — 99284 EMERGENCY DEPT VISIT MOD MDM: CPT

## 2024-02-01 RX ADMIN — Medication 1 TABLET(S): at 17:59

## 2024-02-01 NOTE — PHYSICAL EXAM
[Person] : oriented to person [Place] : oriented to place [Time] : oriented to time [Short Term Intact] : short term memory intact [Fluency] : fluency intact [Current Events] : adequate knowledge of current events [Cranial Nerves Oculomotor (III)] : extraocular motion intact [Cranial Nerves Facial (VII)] : face symmetrical [Cranial Nerves Vestibulocochlear (VIII)] : hearing was intact bilaterally [Abnormal Walk] : normal gait

## 2024-02-01 NOTE — ED PROVIDER NOTE - ED STEMI HIDDEN
DATE OF SERVICE:  03/15/2020    PREOPERATIVE DIAGNOSIS:  Acute appendicitis.    POSTOPERATIVE DIAGNOSIS:  Acute appendicitis with perforation.    OPERATION PERFORMED:  Laparoscopic appendectomy.    ANESTHESIA:  General endotracheal.    ANESTHESIOLOGIST:  Yonatan Roberson MD    SURGEON:  Leonardo Orellana MD    INDICATIONS:  A 40-year-old gentleman with a 2-day history of abdominal pain   localized in the right lower quadrant, seen in the emergency room where he was   found to have a 18,000 white count and CT findings of acute appendicitis.    OPERATIVE FINDINGS:  Acute appendicitis with perforation at its base.    OPERATIVE NOTE:  Patient was placed in supine position, given general   endotracheal anesthesia.  Once properly anesthetized, was prepped and draped   in the usual sterile fashion.  Supraumbilically, a transverse incision was   made after the skin had been anesthetized with 0.5% Marcaine with epinephrine.    The incision was carried down through the skin and subcutaneous tissue.    Towel clamp was used to grab the umbilical stalk and tent the abdominal wall   upward.  Veress needle was inserted without resistance.  Aspiration and   flushing revealed no abnormalities.  Pneumoperitoneum was obtained, opening   pressure was 3.  Once proper level of pneumoperitoneum was obtained, an 11 mm   trocar port was placed.  General exploration was carried out.  There was no   apparent injury from Veress or trocar placement.  Under direct vision, the 12   mm right upper quadrant port and 5 mm suprapubic port were placed after the   skin had been anesthetized with 0.5% Marcaine with epinephrine.  The base of   the cecum was visualized.  The appendix was brought up in the operative field   after taking down adhesions with fibrous exudate to the terminal ileum.  The   mesoappendix was then sterilely taken down with the Harmonic judson and   isolated.  The base was amputated from the cecum with a AUBREE blue Endo load   staple.   The appendix was removed with the EndoCatch bag.  The staple line was   inspected.  There was good approximation of the tissue.  There was no   breakdown.  There was no active bleeding.  The abdomen was irrigated out with   saline until clear.  A lot of fibrous exudate was also removed.  A #10 flat   fluted Davide drain was then passed and brought out through the 5 mm port site   and placed into the pelvis.  It was secured to the skin with a 2-0 nylon.  All   the ports were removed under direct vision.  The right upper quadrant port   was closed with an 0 Vicryl Endoclose.  The umbilical port site was closed   with a figure-of-eight 0 Vicryl suture.  Skin incisions were all closed with   4-0 Vicryl subcuticular closures.  Patient tolerated the procedure well.    There were no apparent complications.  Lap, sponge and instrument counts were   correct.       ____________________________________     MD ERIN Carlos / SUNSHINE    DD:  03/15/2020 04:07:48  DT:  03/15/2020 06:01:57    D#:  [id number]  Job#:  [id number]   hide

## 2024-02-01 NOTE — ED PROVIDER NOTE - RAPID ASSESSMENT
48-year-old female past medical history of breast CA, hyperlipidemia, anxiety, nonobstructive CAD presenting with right wrist wound.  Patient reports that she had a cardiac catheterization January 22.  After the procedure she had been doing well.  Patient noticed a small erythematous bump that formed over the site.  Patient initially noticed some clear discharge from the area, but that has since resolved.  Patient followed up with her PMD and cardiologist and was told that his not anything concerning but was prescribed mupirocin ointment.  Symptoms persisted and was instructed to come to the ER for possible oral antibiotics.  Denies fever/chills, hand numbness.  Brief exam normal pulse, no evidence of pseudoaneurysm.  Small pustule overlying right anterior distal wrist    **Patient was rapidly assessed by me, Yrn Bar PA-C. A limited history was obtained. The patient will be seen and further examined/worked up in the main ED and their care will be completed by the main ED team. Receiving team will follow up on labs, analgesia, any clinical imaging, and perform reassessment and disposition of the patient as clinically indicated. All decisions regarding the progression of care will be made at their discretion.

## 2024-02-01 NOTE — ED PROVIDER NOTE - OBJECTIVE STATEMENT
47 y/o female, hx of breast CA, HLD, CAD, presents to the ER for right wrist wound. patient states had cardiac cath 1/22. states since then has had this small wound to the wrist. states started as very small erythematous bump. states noticed minor clear drainage from wound which has since resolved. was given mupirocin by PMD saw cardiologist as well, was not concerned. due to symptoms not resolving, advised to go to the ER for possible oral abx. denies f/n/v/d, CP, SOB, HA, dizziness, abdominal pain.

## 2024-02-01 NOTE — ED PROVIDER NOTE - ATTENDING CONTRIBUTION TO CARE
49 y/o female, hx of breast CA, HLD, CAD, presents to the ER for right wrist wound s/p cardiac cath 1/22 with r wrist small puncture wound to site, on mupirocin for a few days.  no Signs of infection noted full range of motion no thrill or bruit no swelling or discharge no fluctuance ultrasound ordered with no hematoma or pseudoaneurysm.  To treat, cover with course antibiotics has follow-up with PMD already saw the cardiologist.

## 2024-02-01 NOTE — ED PROVIDER NOTE - PATIENT PORTAL LINK FT
You can access the FollowMyHealth Patient Portal offered by [facility] by registering at the following website: [email or web address]. By joining [employer]’s FollowMyHealth portal, you will also be able to view your health information using other applications (apps) compatible with our system.

## 2024-02-01 NOTE — ED PROVIDER NOTE - CROS ED ROS STATEMENT
all other ROS negative except as per HPI Rhomboid Transposition Flap Text: The defect edges were debeveled with a #15 scalpel blade.  Given the location of the defect and the proximity to free margins a rhomboid transposition flap was deemed most appropriate.  Using a sterile surgical marker, an appropriate rhomboid flap was drawn incorporating the defect.    The area thus outlined was incised deep to adipose tissue with a #15 scalpel blade.  The skin margins were undermined to an appropriate distance in all directions utilizing iris scissors.

## 2024-02-01 NOTE — HISTORY OF PRESENT ILLNESS
[FreeTextEntry1] :  interval history 2/1/24 Patient had __15 migraines/mo prior to botox treatment. Patient now has __1 migraines/mo after the botox treatment. Patient experiences -14- reduction in days and hours of migraines after botox treatment. Medication that the patient tried and failed prior to botox treatment include aimovig, 38 year old woman presents for first time visit for evaluation for migraines. Has had migraines for 15 years. For 1 year after birth of child the migraines became more frequent. Now has them 3 times per week "lasting 3 days" and only with 1-2 headache free days. Patient used to see Dr. Matamoros in clinic. Headaches starts with nausea and then either left temple or both temples feel like pounding, with photophobia and phonophobia. Currently headache on the right temple 5/10. At its worst the headache is 10+/10. Denies vomiting but takes zofran for relief. Takes Maxalt 10 mg as needed and naproxen 500 mg as needed, nortryptilline 50 mg at night (started 3-4 months ago). In the beginning of the headache, patient takes Aleve initially and when the headache becomes more severe, takes Maxalt and naproxen and this relieves the headache. Headaches worse with menstruation, currently cycles irregular. Typically runs of out the Maxalts due to frequency of headaches. Nortryptilline has possibly decreased the intensity of the headaches but not the frequency. Patient was taken off oral contraceptives in August 2013 as they made migraines worse. Currently using Mirena, no change in migraines noted. Patient has tried topamax, did not help and was d/c'ed when interferred with birth control. She has tried Fioricet and Imitrex in abortive medications, no relief.

## 2024-02-01 NOTE — ED ADULT NURSE NOTE - NSFALLHARMRISKINTERV_ED_ALL_ED

## 2024-02-01 NOTE — ED PROVIDER NOTE - ATTENDING APP SHARED VISIT CONTRIBUTION OF CARE
47 y/o female, hx of breast CA, HLD, CAD, presents to the ER for right wrist wound s/p cardiac cath 1/22 with r wrist small puncture wound to site, on mupirocin for a few days.  no Signs of infection noted full range of motion no thrill or bruit no swelling or discharge no fluctuance ultrasound ordered with no hematoma or pseudoaneurysm.  To treat, cover with course antibiotics has follow-up with PMD already saw the cardiologist.

## 2024-02-01 NOTE — ED ADULT NURSE NOTE - OBJECTIVE STATEMENT
Pt is a 48 Y F with PMH of Breast CA, anxiety and CAD came to [facility]  with complaints of a wound from her R wrist SP cath on 1/22/24. Wound is red with a white head on it at the site of the cath. PT states that it was leaking fluids a few days back but stopped. Pt is Aox4 breathing evenly and spontaneously on room air and able to speak in full sentences. Pt denies SOB, Fever chills, chest pain or new onset head ache.  PT was taken to vascular studies in stretcher with safety measurers provided

## 2024-02-01 NOTE — CONSULT NOTE ADULT - SUBJECTIVE AND OBJECTIVE BOX
Vascular Cardiology Consult Note     DIRECT PROVIDER NUMBER: [phone]  / Available on TEAMS    CC: R wrist pain post cath      HPI: 47 y/o F with PMHX HLD, Anxiety, History of Breast CA, s/p recent left heart cath via R radial access on 1/21 revealing mild non-obstructive CAD, who presents to ED for evaluation of R wrist pain post cath. She noted erythema and a small pustule to the R wrist. Denies fever or chills. No compromise to motor strength or sensation. Vascular Cardiology consulted.      Allergies  No Known Allergies    MEDICATIONS: see ED note    PAST MEDICAL & SURGICAL HISTORY:  Fibromyalgia  Migraines  Uterine polyp  Psoriasis  Uterine fibroid  Ovarian cyst  Tibial fracture  Herniated lumbar intervertebral disc  Fibrocystic breast  Endometriosis  Unspecified benign mammary dysplasia of left breast  Anxiety  Other specified disorders of breast  Uterine polyp  Fibroids  S/P spinal fusion  Lower leg fracture  History of myomectomy  History of bilateral tubal ligation  History of dilation and curettage  S/P lumpectomy, left breast    FAMILY HISTORY:  Family history of hypertension  father  Diabetes mellitus, type 2  mother    SOCIAL HISTORY:  unchanged    REVIEW OF SYSTEMS:  CONSTITUTIONAL: No fever  EYES: No eye pain  ENT:  No throat pain  NECK: No pain   RESPIRATORY: No SOB   CARDIOVASCULAR: No C/P  GASTROINTESTINAL: No abdominal pain  GENITOURINARY: No hematuria  NEUROLOGICAL: No memory loss  SKIN: No lower extremity wounds  LYMPH Nodes: No enlarged glands noted  ENDOCRINE: No heat or cold intolerance noted  MUSCULOSKELETAL: R wrist pain   PSYCHIATRIC: No depression, anxiety  HEME/LYMPH: No bleeding gums  ALLERGY AND IMMUNOLOGIC: No hives    [ x] All others negative	    PHYSICAL EXAM:  T(C): 36.9 (02-01-24 @ 14:38), Max: 36.9 (02-01-24 @ 14:38)  HR: 88 (02-01-24 @ 14:38) (88 - 88)  BP: 123/87 (02-01-24 @ 14:38) (123/87 - 123/87)  RR: 18 (02-01-24 @ 14:38) (18 - 18)  SpO2: 97% (02-01-24 @ 14:38) (97% - 97%)  I&O's Summary    Appearance: AND 	  HEENT:  NC/AT  Cardiovascular: RRR, S1 and S2  Respiratory: CTA B/L  Psychiatry:  AAO x 3  Gastrointestinal:  Soft, Non-tender, + BS	  Skin: No cyanosis	  Neurologic: No focal deficit    Extremities: No LE edema  R wrist: radial pulse intact 2+, Barrett test intact, no bruit felt  Small pustule noted over R radial access site, mild erythema     LABS: see sunrise 	 	    Assessment:  1. R wrist discomfort post cath with mild erythema and small pustule   2. Mild non-obstructive CAD  3. HLD  4. History of Breast CA       Plan:  1. Recommend R upper extremity arterial duplex to rule out hematoma, pseudoaneurysm or AV fistula - low suspicion.  2. Radial pulses intact, Barrett test intact, no bruit felt. Motor strength and sensation intact.  3. Infection management as per ED.        Thank you  JESUS Nolen, MS, [facility]  Vascular Cardiology Service    Please call with any questions:   DIRECT SERVICE NUMBER: [phone] / Available on TEAMS

## 2024-02-01 NOTE — ED PROVIDER NOTE - NSFOLLOWUPINSTRUCTIONS_ED_ALL_ED_FT
1. It is important to follow up with your primary care doctor in 1-2 days    2. bring a copy of all your results to your follow up appointments    3. you can take Tylenol as needed for pain. you can take 650mg of Tylenol every 6 hours as needed for pain. do not take more than 4000mg in a 24 hour period.     4.  medication from the pharmacy and take as instructed    5. if your symptoms worsen, persist, or if any new symptoms develop, or if you experience any signs of distress, return to the ER right away.

## 2024-02-11 PROBLEM — Z91.89 INCREASED RISK OF BREAST CANCER: Status: ACTIVE | Noted: 2023-02-11

## 2024-02-12 ENCOUNTER — APPOINTMENT (OUTPATIENT)
Dept: SURGICAL ONCOLOGY | Facility: CLINIC | Age: 49
End: 2024-02-12
Payer: MEDICARE

## 2024-02-12 VITALS
HEIGHT: 58 IN | DIASTOLIC BLOOD PRESSURE: 83 MMHG | HEART RATE: 88 BPM | BODY MASS INDEX: 24.14 KG/M2 | OXYGEN SATURATION: 99 % | WEIGHT: 115 LBS | SYSTOLIC BLOOD PRESSURE: 115 MMHG

## 2024-02-12 DIAGNOSIS — Z91.89 OTHER SPECIFIED PERSONAL RISK FACTORS, NOT ELSEWHERE CLASSIFIED: ICD-10-CM

## 2024-02-12 PROCEDURE — 99215 OFFICE O/P EST HI 40 MIN: CPT

## 2024-02-12 PROCEDURE — G2212 PROLONG OUTPT/OFFICE VIS: CPT

## 2024-02-13 NOTE — PHYSICAL EXAM
[Normal] : supple, no neck mass and thyroid not enlarged [Normal Neck Lymph Nodes] : normal neck lymph nodes  [Normal Supraclavicular Lymph Nodes] : normal supraclavicular lymph nodes [Normal Axillary Lymph Nodes] : normal axillary lymph nodes [Normal] : normal appearance, no rash, nodules, vesicles, ulcers, erythema [de-identified] : Groins not examined [de-identified] : Below

## 2024-02-13 NOTE — REVIEW OF SYSTEMS
[Negative] : Endocrine [FreeTextEntry7] : NKDA [FreeTextEntry9] : Urinary frequency [de-identified] : Migraines [de-identified] : Low back pain [FreeTextEntry1] : Increased risk of breast cancer

## 2024-02-13 NOTE — HISTORY OF PRESENT ILLNESS
[de-identified] : 48-year-old lady.  Breast cancer risk score = 31.4.  She presents today with a ~2-month history of an increasing prominence near the surgical scar in the upper left breast. The area is slightly tender. No preceding injury or strain.  No other signs or symptoms related to either breast.   2022: Excision of atypia from her left breast (upper). Oncoplastic closure: Dr. ALONSO SNELL. Surgical pathology: Intraductal papilloma, fibrocystic change with duct hyperplasia and apocrine metaplasia.  She had fat grafting because she has a little indentation from the lumpectomy. There was transient benefit.   2022: Preoperative breast MRI at [facility]: BI-RADS 2.   Postoperatively she met with medical oncology (Dr. Aura CORDERO). + Tamoxifen, started 2022. SELF-D/D'd May 2022 secondary to side effects.  Dr. Cordero is aware. Patient is not a candidate for other therapies (such as aromatase inhibitors) since she is currently premenopausal. We will revisit this topic after her menopause.....................   2022: She was referred by her gynecologist (Dr. Justice ROBLEDO) with  atypical ductal hyperplasia (focal ADH), AND an intraductal papilloma of the LEFT BREAST (12-1:00).  This was an area of focal asymmetry identified on screening bilateral breast imaging, and subsequent diagnostic studies (below).  Diagnosis was established on stereotactic biopsy performed at [place].  No signs or symptoms related to either breast.  No previous breast biopsies.   + FH: Mother: Breast cancer 64. She did not have genetic testing. No other relatives with breast cancer.  A paternal aunt had ovarian cancer.  Not Ashkenazi.   Our patient had genetic testing at [facility] through medical oncology in : NO deleterious mutations   No other family history of malignancy.   Menarche at 8.  3, para 3, first at 16. LMP: >1-year ago, but not postmenopausal according to hormone measurements through her gynecologist. ***In fact, she uses transdermal HRT. She understands that this should cease.   Breast cancer risk score =31.4%   11/15/2021: Bilateral mammogram and sonogram at [place]: BI-RADS 0. Diagnostic imaging of the left breast recommended. 2021: Diagnostic left mammogram and sonogram at [place]: BI-RADS 4.    PMD:  Dr Gaby POWERS She used to see Dr Amy Hernández.  NKDA.  No pacemaker or defibrillator. No anticoagulants.  + Migraines. Neurology: Dr. Allegra PEGUERO. Treated with Aimovig. She also receives Botox injections.  + Low back pain (LBP). + LLE sciatica.  she had lumbar fusion surgery at [facility]. Orthopedics: Dr. Willam MORGAN.   GYN: Dr. Noel MCKEON. 2023 visit was unremarkable. She used to see Dr. Justice Robledo.  + Problems with urination. Urology: Dr. Katiuska HEDRICK and Dr. Benito TOLEDO   Colonoscopy:  was normal, she does not recall the physician's name.

## 2024-02-13 NOTE — REASON FOR VISIT
[Other: _____] : [unfilled] [FreeTextEntry2] : Annual breast examination, with concern regarding thickening at the site of the surgical scar from her lumpectomy for left breast atypia performed February 2022; breast cancer risk score = 31.4

## 2024-02-13 NOTE — ASSESSMENT
[FreeTextEntry1] : 48-year-old lady seen for follow-up/annual breast examination.  Breast cancer risk score = 31.4.  She presents today with a 2-month history of a concern regarding palpable area in proximity to the surgical scar in the left breast. On my examination it feels like fat necrosis rather than a parenchymal abnormality. On her recent breast imaging she had a postoperative seroma, that was still present, but smaller than 2022, in the same area.  1/4/2024: Bilateral mammography and sonography at [facility]: BI-RADS 3. 1.  Left breast: Equivocal microcalcifications. 2.  Probably benign sonographic findings in the right breast (11:00).  6-month follow-up of each of the above recommended. Prescription entered today. I have asked her to call me a week after the imaging to discuss the results.   June 2023: Bilateral breast MRIs at [place]: BI-RADS 2. Will repeat in June 2024 due to her concern regarding the asymmetry in the upper left breast. Prescription provided.  If asymptomatic, with normal imaging, we should see her in another 6-12 months, sooner if needed.  Reviewed in detail, all questions answered.

## 2024-02-14 ENCOUNTER — APPOINTMENT (OUTPATIENT)
Dept: UROLOGY | Facility: CLINIC | Age: 49
End: 2024-02-14

## 2024-03-02 NOTE — H&P PST ADULT - ASSESSMENT
HPI & ROS: 43-year-old male with no past medical history no relevant surgeries, does not drink does not smoke coming in with multiple days of shortness of breath.   Patient with viral illness approximately 2 weeks ago.  Patient is coming in with acute shortness of breath in the past few days, associated with pain on inspiration of his left anterior lateral chest wall.  No recent trauma.  No falls.  febrile here.  Feels hot.  Having night sweats.  With some weight loss.  No international travel.  No orthopnea or changing of the pain when he lays down.  Trials Tylenol with some relief.  No chills.  Slightly nauseous, no vomiting.  No nausea at this time though.  Patient states possible dysuria.  No abdominal pain.  No diarrhea.  Last bowel movement yesterday was normal.    As a discrepancy from triage note, patient is satting 99% on room air when he is brought back to the room.  Patient is speaking in full sentences in betwixt coughing. 42year old female with pelvic and perineal pain

## 2024-03-13 ENCOUNTER — OUTPATIENT (OUTPATIENT)
Dept: OUTPATIENT SERVICES | Facility: HOSPITAL | Age: 49
LOS: 1 days | Discharge: HOME | End: 2024-03-13

## 2024-03-13 DIAGNOSIS — Z98.890 OTHER SPECIFIED POSTPROCEDURAL STATES: Chronic | ICD-10-CM

## 2024-03-13 DIAGNOSIS — D25.9 LEIOMYOMA OF UTERUS, UNSPECIFIED: Chronic | ICD-10-CM

## 2024-03-13 DIAGNOSIS — S82.90XA UNSPECIFIED FRACTURE OF UNSPECIFIED LOWER LEG, INITIAL ENCOUNTER FOR CLOSED FRACTURE: Chronic | ICD-10-CM

## 2024-03-13 DIAGNOSIS — N84.0 POLYP OF CORPUS UTERI: Chronic | ICD-10-CM

## 2024-03-13 DIAGNOSIS — C50.919 MALIGNANT NEOPLASM OF UNSPECIFIED SITE OF UNSPECIFIED FEMALE BREAST: ICD-10-CM

## 2024-03-13 DIAGNOSIS — Z98.51 TUBAL LIGATION STATUS: Chronic | ICD-10-CM

## 2024-03-13 DIAGNOSIS — Z98.1 ARTHRODESIS STATUS: Chronic | ICD-10-CM

## 2024-03-20 ENCOUNTER — APPOINTMENT (OUTPATIENT)
Dept: CARDIOLOGY | Facility: CLINIC | Age: 49
End: 2024-03-20
Payer: MEDICARE

## 2024-03-20 VITALS
OXYGEN SATURATION: 99 % | HEIGHT: 58 IN | HEART RATE: 84 BPM | DIASTOLIC BLOOD PRESSURE: 77 MMHG | WEIGHT: 117 LBS | SYSTOLIC BLOOD PRESSURE: 113 MMHG | BODY MASS INDEX: 24.56 KG/M2

## 2024-03-20 DIAGNOSIS — R07.89 OTHER CHEST PAIN: ICD-10-CM

## 2024-03-20 DIAGNOSIS — Z82.49 FAMILY HISTORY OF ISCHEMIC HEART DISEASE AND OTHER DISEASES OF THE CIRCULATORY SYSTEM: ICD-10-CM

## 2024-03-20 DIAGNOSIS — I25.10 ATHEROSCLEROTIC HEART DISEASE OF NATIVE CORONARY ARTERY W/OUT ANGINA PECTORIS: ICD-10-CM

## 2024-03-20 PROCEDURE — 99204 OFFICE O/P NEW MOD 45 MIN: CPT | Mod: 25

## 2024-03-20 PROCEDURE — 93000 ELECTROCARDIOGRAM COMPLETE: CPT

## 2024-03-20 RX ORDER — ATORVASTATIN CALCIUM 10 MG/1
10 TABLET, FILM COATED ORAL
Qty: 90 | Refills: 3 | Status: ACTIVE | COMMUNITY
Start: 2024-03-20 | End: 1900-01-01

## 2024-03-20 RX ORDER — OMEGA-3-ACID ETHYL ESTERS 1 G/1
1 CAPSULE, LIQUID FILLED ORAL
Refills: 0 | Status: ACTIVE | COMMUNITY
Start: 2024-03-20

## 2024-03-20 RX ORDER — CEFDINIR 300 MG/1
300 CAPSULE ORAL TWICE DAILY
Qty: 14 | Refills: 0 | Status: DISCONTINUED | COMMUNITY
Start: 2022-12-05 | End: 2024-03-20

## 2024-03-20 RX ORDER — PHENAZOPYRIDINE HCL 95 MG
95 TABLET ORAL EVERY 8 HOURS
Qty: 9 | Refills: 0 | Status: DISCONTINUED | COMMUNITY
Start: 2022-12-06 | End: 2024-03-20

## 2024-03-20 RX ORDER — DULOXETINE HYDROCHLORIDE 60 MG/1
60 CAPSULE, DELAYED RELEASE PELLETS ORAL
Qty: 60 | Refills: 0 | Status: DISCONTINUED | COMMUNITY
Start: 2023-03-08 | End: 2024-03-20

## 2024-03-20 RX ORDER — ERGOCALCIFEROL 1.25 MG/1
1.25 MG CAPSULE, LIQUID FILLED ORAL
Qty: 8 | Refills: 0 | Status: DISCONTINUED | COMMUNITY
Start: 2022-03-14 | End: 2024-03-20

## 2024-03-20 RX ORDER — METHENAMINE HIPPURATE 1 G/1
1 TABLET ORAL
Qty: 60 | Refills: 5 | Status: DISCONTINUED | COMMUNITY
Start: 2022-12-05 | End: 2024-03-20

## 2024-03-20 RX ORDER — PHENAZOPYRIDINE HYDROCHLORIDE 200 MG/1
200 TABLET ORAL 3 TIMES DAILY
Qty: 21 | Refills: 0 | Status: DISCONTINUED | COMMUNITY
Start: 2022-12-05 | End: 2024-03-20

## 2024-03-20 RX ORDER — TAMSULOSIN HYDROCHLORIDE 0.4 MG/1
0.4 CAPSULE ORAL
Qty: 30 | Refills: 1 | Status: DISCONTINUED | COMMUNITY
Start: 2023-09-19 | End: 2024-03-20

## 2024-03-20 RX ORDER — FLUCONAZOLE 150 MG/1
150 TABLET ORAL
Qty: 1 | Refills: 0 | Status: DISCONTINUED | COMMUNITY
Start: 2023-01-26 | End: 2024-03-20

## 2024-03-20 RX ORDER — PHENAZOPYRIDINE 200 MG/1
200 TABLET, FILM COATED ORAL
Qty: 6 | Refills: 0 | Status: DISCONTINUED | COMMUNITY
Start: 2022-12-08 | End: 2024-03-20

## 2024-03-20 RX ORDER — ZOLPIDEM TARTRATE 5 MG/1
5 TABLET ORAL
Refills: 0 | Status: DISCONTINUED | COMMUNITY
Start: 2022-01-01 | End: 2024-03-20

## 2024-03-20 NOTE — CARDIOLOGY SUMMARY
[de-identified] : SR 80s [de-identified] : erfusion: Qualitative Findings:   --There are small, mild defects in the distal inferior and distal inferolateral walls that are partially reversible suggestive of mild ischemia with partial scarring. --There are small, mild defects in the basal inferior and basal septal walls that are mostly fixed suggestive of scar with minimal ischemia.   Left Ventricular Motion: Post-stress gated wall motion analysis revealed reduced systolic thickening of the basal and distal inferior, distal inferolateral, and basal septal walls. Overall left ventricular ejection fraction is normal. ---------------------------------------------------------------------------------------------------------------------------------------------------------   Ventricular Function: The left ventricle is small in size. The post stress left ventricular EF% is 66 %. The post stress end diastolic volume is 49 ml and systolic volume is 17 ml.   Interpreting Provider: Electronically signed on 1/21/2024 at 2:31:44 PM by Flex Pan   [de-identified] :  INTERPRETATION:  A focused transthoracic cardiac ultrasound examination was performed. No pericardial effusion was present. No global wall motion abnormality was identified Preserved cardiac contractility  IMPRESSION: No Pericardial Effusion. [de-identified] : Diagnostic Findings:   Coronary Angiography  The coronary circulation is right dominant.    LM  Left main artery: Angiography shows no disease.    LAD  Left anterior descending artery: Angiography shows minor irregularities. First diagonal: There is a 15 % stenosis.  CX  Circumflex: Angiography shows no disease.    RCA   Patient: HERNAN ABREU            MRN: [id number] Study Date: 01/22/2024   12:50 PM      Page 1 of 3

## 2024-03-20 NOTE — HISTORY OF PRESENT ILLNESS
[FreeTextEntry1] : Ms. HERNAN ABREU 48 year - old F presents in s/p recent hospitalization with chest pain s/p cath is here Mar 20, 2024 for follow up and to establish care in the Women's heart health program. 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 At the present time Denies chest pain, shortness of breath, dizziness, lightheadedness, palpitations or near syncope or syncope, orthopnea, PND and increasing lower extremity edema. Denies any prior history of MI, CAD, heart failure, stroke, TIA, DVT, Pulmonary embolism, and exertional dyspnea or calf pain. Also denies fevers, chills, weight change, recent travel or nocturnal awakenings or daytime somnolence.  # CAD: Cardiac cath 3/2024  LAD -15% stenosis  Continue Asa 81 mg QD  Add Atorvastatin 10  mg QHS   # HLD: Encouraged patient to continue healthy exercise and eating habits, focusing on a Mediterranean style of eating and aiming for the recommended 150 minutes per week of moderate physical activity. Start Atorvastatin 10 mg QHS with COQ -10 mg 200 mg QD  Encouraged patient to continue healthy exercise and eating habits, focusing on a Mediterranean style of eating and aiming for the recommended 150 minutes per week of moderate physical activity.   # Hypertriglyceridemia: Continue Lovaza  Encouraged patient to continue healthy exercise and eating habits, focusing on a Mediterranean style of eating and aiming for the recommended 150 minutes per week of moderate physical activity.

## 2024-03-20 NOTE — PHYSICAL EXAM
[Well Developed] : well developed [Well Nourished] : well nourished [No Acute Distress] : no acute distress [Normal Conjunctiva] : normal conjunctiva [Normal Venous Pressure] : normal venous pressure [No Carotid Bruit] : no carotid bruit [No Murmur] : no murmur [Normal S1, S2] : normal S1, S2 [No Rub] : no rub [No Gallop] : no gallop [Good Air Entry] : good air entry [Clear Lung Fields] : clear lung fields [No Respiratory Distress] : no respiratory distress  [Soft] : abdomen soft [Non Tender] : non-tender [Normal Bowel Sounds] : normal bowel sounds [No Masses/organomegaly] : no masses/organomegaly [Normal Gait] : normal gait [No Edema] : no edema [No Clubbing] : no clubbing [No Cyanosis] : no cyanosis [No Varicosities] : no varicosities [No Rash] : no rash [No Skin Lesions] : no skin lesions [Moves all extremities] : moves all extremities [No Focal Deficits] : no focal deficits [Normal Speech] : normal speech [Alert and Oriented] : alert and oriented [Normal memory] : normal memory

## 2024-03-20 NOTE — DISCUSSION/SUMMARY
[EKG obtained to assist in diagnosis and management of assessed problem(s)] : EKG obtained to assist in diagnosis and management of assessed problem(s) [FreeTextEntry1] : Ms. HERNAN ABREU 48 year - old F presents in s/p recent hospitalization with chest pain s/p cath is here Mar 20, 2024 for follow up and to establish care in the Women's heart health program. Patient carries a strong FH of CAD / CABG ( dad ), sister with ICD and another sister with CAD / PCI and personal history of Migraine HA, familial triglyceridemic,  psoriasis, Lt. Breast Intraductal papilloma with focal ADH on Tamoxifan presents in for follow up s/p recent hospitalization with chest pain.   # CAD: Cardiac cath 3/2024  LAD -15% stenosis  Continue Asa 81 mg QD  Add Atorvastatin 10  mg QHS   # HLD: Encouraged patient to continue healthy exercise and eating habits, focusing on a Mediterranean style of eating and aiming for the recommended 150 minutes per week of moderate physical activity. Start Atorvastatin 10 mg QHS with COQ -10 mg 200 mg QD  Encouraged patient to continue healthy exercise and eating habits, focusing on a Mediterranean style of eating and aiming for the recommended 150 minutes per week of moderate physical activity.  # Hypertriglyceridemia: Continue Lovaza  Encouraged patient to continue healthy exercise and eating habits, focusing on a Mediterranean style of eating and aiming for the recommended 150 minutes per week of moderate physical activity.  Patient to do lipid panel at PMD's office.   Fu 6 months

## 2024-03-25 ENCOUNTER — APPOINTMENT (OUTPATIENT)
Dept: HEMATOLOGY ONCOLOGY | Facility: CLINIC | Age: 49
End: 2024-03-25
Payer: MEDICARE

## 2024-03-25 VITALS
BODY MASS INDEX: 24.42 KG/M2 | TEMPERATURE: 98 F | WEIGHT: 116.84 LBS | HEART RATE: 86 BPM | SYSTOLIC BLOOD PRESSURE: 106 MMHG | RESPIRATION RATE: 16 BRPM | OXYGEN SATURATION: 97 % | DIASTOLIC BLOOD PRESSURE: 72 MMHG

## 2024-03-25 DIAGNOSIS — Z79.810 LONG TERM (CURRENT) USE OF SELECTIVE ESTROGEN RECEPTOR MODULATORS (SERMS): ICD-10-CM

## 2024-03-25 DIAGNOSIS — N60.92 UNSPECIFIED BENIGN MAMMARY DYSPLASIA OF LEFT BREAST: ICD-10-CM

## 2024-03-25 PROCEDURE — G2211 COMPLEX E/M VISIT ADD ON: CPT

## 2024-03-25 PROCEDURE — 99214 OFFICE O/P EST MOD 30 MIN: CPT

## 2024-03-25 RX ORDER — TAMOXIFEN CITRATE 10 MG/1
10 TABLET, FILM COATED ORAL DAILY
Qty: 45 | Refills: 1 | Status: ACTIVE | COMMUNITY
Start: 2022-06-20 | End: 1900-01-01

## 2024-03-25 NOTE — HISTORY OF PRESENT ILLNESS
[de-identified] : Ms. HERNAN ABREU is a 46 year old female here for an evaluation of breast cancer prevention. Her oncologic history is as follows:\par  \par  11/15/2021:\par  Bilateral mammogram and sonogram at [facility]: BI-RADS 0.\par  Diagnostic imaging of the left breast recommended.\par  2021:\par  Diagnostic left mammogram and sonogram at [facility]: BI-RADS 4.\par  \par  2021: left breast bx\par  intraductal papilloma with focal ADH\par  \par  2022:\par  Bilateral breast MRI at [facility]: BI-RADS 2.\par  The area of enhancement extends 1.4 cm anterior to the clip, and 1.0 cm medial to the clip\par  \par  2022, she had excision of atypia from the upper left breast, with preoperative localization.\par  Oncoplastic closure: Dr. ALONSO Gupta.\par  Surgical pathology:\par  No residual atypia.\par  + Intraductal papillomata.\par  + Fibrocystic changes, with usual duct hyperplasia, and apocrine metaplasia.\par  \par  + FH:\par  Mother: Breast cancer 64.\par  She did not have genetic testing.\par  No other relatives with breast cancer.\par  \par  Menarche at 8.\par   3, para 3, first at 16.\par  LMP: >1-year ago, but not postmenopausal according to hormone measurements through her gynecologist.\par  ***In fact, she uses transdermal HRT. She understands that this should cease.\par  GYN: Dr. Justice JOHN.\par  2021 visit was unremarkable.\par  \par  \par  + Migraines.\par  Neurology: Dr. Allegra PEGUERO.\par  Treated with Aimovig.\par  She also receives Botox injections.\par  \par   she had lumbar fusion surgery at [facility].\par  Orthopedics: Dr. Willam MORGAN.\par  \par  \par  \par  Colonoscopy:\par  At age 45 was normal, she does not recall the physician's name\par  \par  She has severe migraine, takes botox every 3 m, use meds PRN\par  Insomnia and anxiety\par  Psoriasis, on stelera every 3 m. Dr Trocolli, derm \par  Herniated disc from trauma, s/p surgery \par  Sister had genetic testing and has a VUS \par  \par  2022: She saw Dr Knowles. She isn't having periods x 2 yrs but estrogen level in premenopausal range. She is s/p progesterone treatment but no periods yet. She will f/u with gyn\par  She took tamoxifen x 2 days. Took at night. Woke up with nausea in am . No vomiting. She had poor appetite. She stopped tamoxifen after 2 days and sx resolved. \par  Since she is premenopausal, tamoxifen is the only option. She does have anxiety and takes xanax PRN and Ambien HS. Rec to see psychiatrist re anxiety\par  Try tamoxifen after breakfast or lunch, try zofran PRN\par  genetic testing pending\par  \par  \par  2022\par  She restarted tamoxifen 2022 .\par  She stopped taking tamoxifen x 3 weeks ago \par  She felt cloudy in her head, hot flashes \par  s/p second opinion at [facility] high risk clinic ( seen by NP) - discussed low dose tamoxifen and contrast mammo/sono. She discussed imaging with Dr Ogden\par  genetics- invitae negative 2022\par  She is unable to tolerate full dose, doesn't want unprotected either due to FH and personal history of ADH. Discussed low dose escalante based on small study ( n=500) published in [employer]. https://www.ncbi.nlm.nih.gov/pmc/articles/WLJ9356396/. \par  We discussed it is not standard of care but can be tired in this patient due to intolerable s/e to full dose tamoxifen. Pt was in agreement \par  \par  2022\par  Patient is currently is on low does tamoxifen 10 mg hot flashes have decreased and are tolerable. Nausea and fogginess has improved with lower dose..  She reports weight loss but weight stable.\par  No vaginal discharge or bleeding noted\par  She saw GYN 2022 reminded to schedule US\par  SHe has 3 children 29 yo girl, 16 yo boy and 10 yo boy \par  2022 mammo BIRADS 3  Newly seen nodule in the central slightly inner left breast for which 6 month left diagnostic mammography/targeted sono rec.\par  \par   [de-identified] : Ms. HERNAN ABREU is a 46 year old premenopausal female who underwent left breast bx and was found to have a focus of atypical ductal hyperplasia 2021. Excision 2022 didn't show additional pathologic findings, specifically in -situ carcinoma or invasive lesion. She has h/o previous benign biopsy. Her mother  from breast cancer. Tamoxifen started 3/2022 and switched to escalante 10 mg daily from 2022 due to s/e. 5 mg from 2023  3/2024 Pt is on Tamoxifen 5 mg daily. Has hot flashes although mood is better.   She denies mood changes, wt gain, vaginal dryness, SOB, chest pain, leg swelling or clotting issues.  LMP date:, FSH/E c/w menopause. Would hold off switching to AI given uncontrolled cholesterol issues.  Breast imaging: MRI 2023 reviewed, M/S due 2023 Opthal: seeing annually,GYN - seeing annually,  Her chol is high ,f/b cardio. Was in the ER 3/2024 with chest pain, s/p cath with 20% stenosis. Chol meds added. Remains on ASA 81 with low dose escalante

## 2024-03-25 NOTE — PHYSICAL EXAM
[Fully active, able to carry on all pre-disease performance without restriction] : Status 0 - Fully active, able to carry on all pre-disease performance without restriction [Normal] : grossly intact [de-identified] : Well healed left lumpectomy scar

## 2024-03-25 NOTE — ASSESSMENT
[FreeTextEntry1] : Ms. HERNAN ABREU is a 46 year old premenopausal female who underwent left breast bx and was found to have a focus of atypical ductal hyperplasia 2021. Excision 2022 didn't show additional pathologic findings, specifically in -situ carcinoma or invasive lesion. She has h/o previous benign biopsy. Her mother  from breast cancer. Tamoxifen started 3/2022 and switched to escalante 10 mg daily from 2022 due to s/e  ADH:  Pt is on Tamoxifen 5 mg daily. .  Breast imaging: MRI 2023 reviewed, M/S due 2023 Plan to continue tamoxifen for 5 years.  GYN and opthal f/u annually due to risk of endometrial ca and cataracts respectively.  Up to date with Breast imaging  - FSH/E c/w menopause. Would hold off switching to AI given uncontrolled cholesterol issues. Her chol is high ,f/b cardio. Was in the ER 3/2024 with chest pain, s/p cath with 20% stenosis. Chol meds added. Remains on ASA 81 with low dose escalante -  Mild hot flashes: 2/2 tamoxifen.  Advised to wear layers and use fan prn. Consider Effexor if get worse. - Patient is aware of signs and symptoms of DVT/PE. Patient will report if she develops acute onset chest pain shortness of breath, leg swelling or calf pain.    RTC 6 m.

## 2024-04-29 ENCOUNTER — APPOINTMENT (OUTPATIENT)
Dept: UROLOGY | Facility: CLINIC | Age: 49
End: 2024-04-29
Payer: MEDICARE

## 2024-04-29 ENCOUNTER — OUTPATIENT (OUTPATIENT)
Dept: OUTPATIENT SERVICES | Facility: HOSPITAL | Age: 49
LOS: 1 days | End: 2024-04-29
Payer: MEDICARE

## 2024-04-29 DIAGNOSIS — Z98.890 OTHER SPECIFIED POSTPROCEDURAL STATES: Chronic | ICD-10-CM

## 2024-04-29 DIAGNOSIS — Z98.1 ARTHRODESIS STATUS: Chronic | ICD-10-CM

## 2024-04-29 DIAGNOSIS — S82.90XA UNSPECIFIED FRACTURE OF UNSPECIFIED LOWER LEG, INITIAL ENCOUNTER FOR CLOSED FRACTURE: Chronic | ICD-10-CM

## 2024-04-29 DIAGNOSIS — N84.0 POLYP OF CORPUS UTERI: Chronic | ICD-10-CM

## 2024-04-29 DIAGNOSIS — R35.0 FREQUENCY OF MICTURITION: ICD-10-CM

## 2024-04-29 DIAGNOSIS — N39.0 URINARY TRACT INFECTION, SITE NOT SPECIFIED: ICD-10-CM

## 2024-04-29 DIAGNOSIS — Z98.51 TUBAL LIGATION STATUS: Chronic | ICD-10-CM

## 2024-04-29 DIAGNOSIS — D25.9 LEIOMYOMA OF UTERUS, UNSPECIFIED: Chronic | ICD-10-CM

## 2024-04-29 PROCEDURE — 99213 OFFICE O/P EST LOW 20 MIN: CPT | Mod: 25

## 2024-04-29 PROCEDURE — 51700 IRRIGATION OF BLADDER: CPT

## 2024-04-29 RX ORDER — PHENAZOPYRIDINE 200 MG/1
200 TABLET, FILM COATED ORAL 3 TIMES DAILY
Qty: 30 | Refills: 0 | Status: ACTIVE | COMMUNITY
Start: 2024-04-29 | End: 1900-01-01

## 2024-04-30 DIAGNOSIS — N30.10 INTERSTITIAL CYSTITIS (CHRONIC) WITHOUT HEMATURIA: ICD-10-CM

## 2024-04-30 DIAGNOSIS — N39.0 URINARY TRACT INFECTION, SITE NOT SPECIFIED: ICD-10-CM

## 2024-04-30 LAB
APPEARANCE: ABNORMAL
BACTERIA: ABNORMAL /HPF
BILIRUBIN URINE: NEGATIVE
BLOOD URINE: NEGATIVE
CAST: 0 /LPF
COLOR: YELLOW
EPITHELIAL CELLS: >36 /HPF
GLUCOSE QUALITATIVE U: NEGATIVE MG/DL
KETONES URINE: NEGATIVE MG/DL
LEUKOCYTE ESTERASE URINE: NEGATIVE
MICROSCOPIC-UA: NORMAL
NITRITE URINE: NEGATIVE
PH URINE: 6
PROTEIN URINE: NEGATIVE MG/DL
RED BLOOD CELLS URINE: 0 /HPF
REVIEW: NORMAL
SPECIFIC GRAVITY URINE: 1.02
UROBILINOGEN URINE: 0.2 MG/DL
WHITE BLOOD CELLS URINE: 8 /HPF

## 2024-05-01 LAB — BACTERIA UR CULT: NORMAL

## 2024-05-02 ENCOUNTER — APPOINTMENT (OUTPATIENT)
Dept: NEUROLOGY | Facility: CLINIC | Age: 49
End: 2024-05-02

## 2024-05-03 ENCOUNTER — APPOINTMENT (OUTPATIENT)
Dept: NEUROLOGY | Facility: CLINIC | Age: 49
End: 2024-05-03
Payer: MEDICARE

## 2024-05-03 VITALS
WEIGHT: 115 LBS | HEIGHT: 58 IN | SYSTOLIC BLOOD PRESSURE: 121 MMHG | HEART RATE: 81 BPM | DIASTOLIC BLOOD PRESSURE: 80 MMHG | BODY MASS INDEX: 24.14 KG/M2

## 2024-05-03 DIAGNOSIS — G43.709 CHRONIC MIGRAINE W/OUT AURA, NOT INTRACTABLE, W/OUT STATUS MIGRAINOSUS: ICD-10-CM

## 2024-05-03 PROCEDURE — 64615 CHEMODENERV MUSC MIGRAINE: CPT

## 2024-05-13 ENCOUNTER — APPOINTMENT (OUTPATIENT)
Dept: UROLOGY | Facility: CLINIC | Age: 49
End: 2024-05-13

## 2024-05-17 NOTE — ASU DISCHARGE PLAN (ADULT/PEDIATRIC). - PAIN
[Normal Appearance] : normal appearance [Well Groomed] : well groomed [General Appearance - In No Acute Distress] : no acute distress [Edema] : no peripheral edema [Respiration, Rhythm And Depth] : normal respiratory rhythm and effort [Exaggerated Use Of Accessory Muscles For Inspiration] : no accessory muscle use [Abdomen Soft] : soft [Abdomen Tenderness] : non-tender [Costovertebral Angle Tenderness] : no ~M costovertebral angle tenderness [Urinary Bladder Findings] : the bladder was normal on palpation prescription called to pharmacy [Normal Station and Gait] : the gait and station were normal for the patient's age [] : no rash [No Focal Deficits] : no focal deficits [Affect] : the affect was normal [Oriented To Time, Place, And Person] : oriented to person, place, and time [Mood] : the mood was normal [No Palpable Adenopathy] : no palpable adenopathy

## 2024-05-20 ENCOUNTER — RESULT REVIEW (OUTPATIENT)
Age: 49
End: 2024-05-20

## 2024-05-20 ENCOUNTER — APPOINTMENT (OUTPATIENT)
Dept: UROLOGY | Facility: CLINIC | Age: 49
End: 2024-05-20
Payer: COMMERCIAL

## 2024-05-20 ENCOUNTER — OUTPATIENT (OUTPATIENT)
Dept: OUTPATIENT SERVICES | Facility: HOSPITAL | Age: 49
LOS: 1 days | End: 2024-05-20
Payer: MEDICARE

## 2024-05-20 VITALS
DIASTOLIC BLOOD PRESSURE: 91 MMHG | HEART RATE: 85 BPM | SYSTOLIC BLOOD PRESSURE: 138 MMHG | TEMPERATURE: 97.6 F | RESPIRATION RATE: 15 BRPM | OXYGEN SATURATION: 99 %

## 2024-05-20 DIAGNOSIS — Z98.890 OTHER SPECIFIED POSTPROCEDURAL STATES: Chronic | ICD-10-CM

## 2024-05-20 DIAGNOSIS — R31.0 GROSS HEMATURIA: ICD-10-CM

## 2024-05-20 DIAGNOSIS — R10.2 PELVIC AND PERINEAL PAIN: ICD-10-CM

## 2024-05-20 DIAGNOSIS — N20.0 CALCULUS OF KIDNEY: ICD-10-CM

## 2024-05-20 DIAGNOSIS — N39.0 URINARY TRACT INFECTION, SITE NOT SPECIFIED: ICD-10-CM

## 2024-05-20 DIAGNOSIS — N84.0 POLYP OF CORPUS UTERI: Chronic | ICD-10-CM

## 2024-05-20 DIAGNOSIS — R35.0 FREQUENCY OF MICTURITION: ICD-10-CM

## 2024-05-20 DIAGNOSIS — S82.90XA UNSPECIFIED FRACTURE OF UNSPECIFIED LOWER LEG, INITIAL ENCOUNTER FOR CLOSED FRACTURE: Chronic | ICD-10-CM

## 2024-05-20 PROCEDURE — 51700 IRRIGATION OF BLADDER: CPT

## 2024-05-20 PROCEDURE — 76775 US EXAM ABDO BACK WALL LIM: CPT | Mod: 26

## 2024-05-20 PROCEDURE — 76775 US EXAM ABDO BACK WALL LIM: CPT

## 2024-05-20 PROCEDURE — 99214 OFFICE O/P EST MOD 30 MIN: CPT | Mod: 25

## 2024-05-20 RX ORDER — CIPROFLOXACIN HYDROCHLORIDE 500 MG/1
500 TABLET, FILM COATED ORAL
Qty: 14 | Refills: 0 | Status: ACTIVE | COMMUNITY
Start: 2024-05-20 | End: 1900-01-01

## 2024-05-20 RX ORDER — ALFUZOSIN HYDROCHLORIDE 10 MG/1
10 TABLET, EXTENDED RELEASE ORAL
Qty: 30 | Refills: 1 | Status: ACTIVE | COMMUNITY
Start: 2024-05-20 | End: 1900-01-01

## 2024-05-21 ENCOUNTER — APPOINTMENT (OUTPATIENT)
Dept: CT IMAGING | Facility: IMAGING CENTER | Age: 49
End: 2024-05-21
Payer: MEDICARE

## 2024-05-21 ENCOUNTER — OUTPATIENT (OUTPATIENT)
Dept: OUTPATIENT SERVICES | Facility: HOSPITAL | Age: 49
LOS: 1 days | End: 2024-05-21
Payer: MEDICARE

## 2024-05-21 DIAGNOSIS — Z98.51 TUBAL LIGATION STATUS: Chronic | ICD-10-CM

## 2024-05-21 DIAGNOSIS — N20.0 CALCULUS OF KIDNEY: ICD-10-CM

## 2024-05-21 DIAGNOSIS — Z98.1 ARTHRODESIS STATUS: Chronic | ICD-10-CM

## 2024-05-21 DIAGNOSIS — Z98.890 OTHER SPECIFIED POSTPROCEDURAL STATES: Chronic | ICD-10-CM

## 2024-05-21 DIAGNOSIS — R31.0 GROSS HEMATURIA: ICD-10-CM

## 2024-05-21 DIAGNOSIS — N39.0 URINARY TRACT INFECTION, SITE NOT SPECIFIED: ICD-10-CM

## 2024-05-21 DIAGNOSIS — N30.10 INTERSTITIAL CYSTITIS (CHRONIC) WITHOUT HEMATURIA: ICD-10-CM

## 2024-05-21 DIAGNOSIS — S82.90XA UNSPECIFIED FRACTURE OF UNSPECIFIED LOWER LEG, INITIAL ENCOUNTER FOR CLOSED FRACTURE: Chronic | ICD-10-CM

## 2024-05-21 DIAGNOSIS — N84.0 POLYP OF CORPUS UTERI: Chronic | ICD-10-CM

## 2024-05-21 LAB
APPEARANCE: ABNORMAL
BACTERIA: NEGATIVE /HPF
BILIRUBIN URINE: NEGATIVE
BLOOD URINE: ABNORMAL
CAST: 0 /LPF
COLOR: ABNORMAL
EPITHELIAL CELLS: 1 /HPF
GLUCOSE QUALITATIVE U: NEGATIVE MG/DL
KETONES URINE: NEGATIVE MG/DL
LEUKOCYTE ESTERASE URINE: ABNORMAL
MICROSCOPIC-UA: NORMAL
NITRITE URINE: NEGATIVE
PH URINE: 6
PROTEIN URINE: 100 MG/DL
RED BLOOD CELLS URINE: 1395 /HPF
SPECIFIC GRAVITY URINE: 1.01
UROBILINOGEN URINE: 0.2 MG/DL
WHITE BLOOD CELLS URINE: 155 /HPF

## 2024-05-21 PROCEDURE — 74178 CT ABD&PLV WO CNTR FLWD CNTR: CPT | Mod: 26

## 2024-05-21 PROCEDURE — 74178 CT ABD&PLV WO CNTR FLWD CNTR: CPT

## 2024-05-22 DIAGNOSIS — N30.10 INTERSTITIAL CYSTITIS (CHRONIC) WITHOUT HEMATURIA: ICD-10-CM

## 2024-05-22 DIAGNOSIS — N39.0 URINARY TRACT INFECTION, SITE NOT SPECIFIED: ICD-10-CM

## 2024-05-22 DIAGNOSIS — R31.0 GROSS HEMATURIA: ICD-10-CM

## 2024-05-22 DIAGNOSIS — R10.2 PELVIC AND PERINEAL PAIN: ICD-10-CM

## 2024-05-22 DIAGNOSIS — N20.0 CALCULUS OF KIDNEY: ICD-10-CM

## 2024-05-23 LAB — BACTERIA UR CULT: ABNORMAL

## 2024-05-23 RX ORDER — CEFUROXIME AXETIL 500 MG/1
500 TABLET ORAL
Qty: 14 | Refills: 0 | Status: ACTIVE | COMMUNITY
Start: 2024-05-23 | End: 1900-01-01

## 2024-05-28 LAB — URINE CYTOLOGY: NORMAL

## 2024-05-30 ENCOUNTER — APPOINTMENT (OUTPATIENT)
Dept: OBGYN | Facility: CLINIC | Age: 49
End: 2024-05-30

## 2024-05-31 ENCOUNTER — APPOINTMENT (OUTPATIENT)
Dept: NEUROLOGY | Facility: CLINIC | Age: 49
End: 2024-05-31

## 2024-06-03 ENCOUNTER — APPOINTMENT (OUTPATIENT)
Dept: UROLOGY | Facility: CLINIC | Age: 49
End: 2024-06-03

## 2024-06-04 ENCOUNTER — APPOINTMENT (OUTPATIENT)
Dept: UROLOGY | Facility: CLINIC | Age: 49
End: 2024-06-04
Payer: MEDICARE

## 2024-06-04 VITALS
HEIGHT: 58 IN | RESPIRATION RATE: 16 BRPM | HEART RATE: 96 BPM | WEIGHT: 115 LBS | SYSTOLIC BLOOD PRESSURE: 124 MMHG | DIASTOLIC BLOOD PRESSURE: 78 MMHG | BODY MASS INDEX: 24.14 KG/M2 | OXYGEN SATURATION: 98 %

## 2024-06-04 PROCEDURE — 51700 IRRIGATION OF BLADDER: CPT

## 2024-06-07 ENCOUNTER — APPOINTMENT (OUTPATIENT)
Dept: UROLOGY | Facility: CLINIC | Age: 49
End: 2024-06-07

## 2024-06-18 ENCOUNTER — APPOINTMENT (OUTPATIENT)
Dept: UROLOGY | Facility: CLINIC | Age: 49
End: 2024-06-18
Payer: MEDICARE

## 2024-06-18 DIAGNOSIS — N30.10 INTERSTITIAL CYSTITIS (CHRONIC) W/OUT HEMATURIA: ICD-10-CM

## 2024-06-18 DIAGNOSIS — R39.89 OTHER SYMPTOMS AND SIGNS INVOLVING THE GENITOURINARY SYSTEM: ICD-10-CM

## 2024-06-18 DIAGNOSIS — N30.90 CYSTITIS, UNSPECIFIED W/OUT HEMATURIA: ICD-10-CM

## 2024-06-18 PROCEDURE — 51700 IRRIGATION OF BLADDER: CPT

## 2024-07-05 ENCOUNTER — RESULT REVIEW (OUTPATIENT)
Age: 49
End: 2024-07-05

## 2024-07-05 ENCOUNTER — APPOINTMENT (OUTPATIENT)
Dept: UROLOGY | Facility: CLINIC | Age: 49
End: 2024-07-05
Payer: MEDICARE

## 2024-07-05 ENCOUNTER — APPOINTMENT (OUTPATIENT)
Dept: ULTRASOUND IMAGING | Facility: IMAGING CENTER | Age: 49
End: 2024-07-05

## 2024-07-05 ENCOUNTER — APPOINTMENT (OUTPATIENT)
Dept: MAMMOGRAPHY | Facility: IMAGING CENTER | Age: 49
End: 2024-07-05

## 2024-07-05 ENCOUNTER — OUTPATIENT (OUTPATIENT)
Dept: OUTPATIENT SERVICES | Facility: HOSPITAL | Age: 49
LOS: 1 days | End: 2024-07-05
Payer: MEDICARE

## 2024-07-05 VITALS
HEIGHT: 58 IN | WEIGHT: 115 LBS | BODY MASS INDEX: 24.14 KG/M2 | OXYGEN SATURATION: 97 % | SYSTOLIC BLOOD PRESSURE: 121 MMHG | TEMPERATURE: 98 F | HEART RATE: 81 BPM | DIASTOLIC BLOOD PRESSURE: 82 MMHG | RESPIRATION RATE: 16 BRPM

## 2024-07-05 DIAGNOSIS — Z98.51 TUBAL LIGATION STATUS: Chronic | ICD-10-CM

## 2024-07-05 DIAGNOSIS — S82.90XA UNSPECIFIED FRACTURE OF UNSPECIFIED LOWER LEG, INITIAL ENCOUNTER FOR CLOSED FRACTURE: Chronic | ICD-10-CM

## 2024-07-05 DIAGNOSIS — Z98.890 OTHER SPECIFIED POSTPROCEDURAL STATES: Chronic | ICD-10-CM

## 2024-07-05 DIAGNOSIS — Z98.1 ARTHRODESIS STATUS: Chronic | ICD-10-CM

## 2024-07-05 DIAGNOSIS — R10.2 PELVIC AND PERINEAL PAIN: ICD-10-CM

## 2024-07-05 DIAGNOSIS — N60.92 UNSPECIFIED BENIGN MAMMARY DYSPLASIA OF LEFT BREAST: ICD-10-CM

## 2024-07-05 DIAGNOSIS — N84.0 POLYP OF CORPUS UTERI: Chronic | ICD-10-CM

## 2024-07-05 DIAGNOSIS — D25.9 LEIOMYOMA OF UTERUS, UNSPECIFIED: Chronic | ICD-10-CM

## 2024-07-05 DIAGNOSIS — N30.10 INTERSTITIAL CYSTITIS (CHRONIC) W/OUT HEMATURIA: ICD-10-CM

## 2024-07-05 PROCEDURE — 77065 DX MAMMO INCL CAD UNI: CPT | Mod: 26,LT

## 2024-07-05 PROCEDURE — 51700 IRRIGATION OF BLADDER: CPT

## 2024-07-05 PROCEDURE — G0279: CPT

## 2024-07-05 PROCEDURE — 76642 ULTRASOUND BREAST LIMITED: CPT | Mod: 26,RT

## 2024-07-05 PROCEDURE — G0279: CPT | Mod: 26

## 2024-07-05 PROCEDURE — 77065 DX MAMMO INCL CAD UNI: CPT

## 2024-07-05 PROCEDURE — 76642 ULTRASOUND BREAST LIMITED: CPT

## 2024-07-12 NOTE — ASU PATIENT PROFILE, ADULT - HAVE YOU HAD A FIRST COVID-19 BOOSTER?
Multi-Disciplinary Problems (from Behavioral Health Treatment Plan)      Active Problems       Problem: Anxiety  Start Date: 07/12/24      Problem Details: The patient self-scales this problem as a 6 with 10 being the worst.          Goal Priority Start Date Expected End Date End Date    Patient will develop and implement behavioral and cognitive strategies to reduce anxiety and irrational fears. -- 07/12/24 -- --    Goal Details: Progress toward goal:  Not appropriate to rate progress toward goal since this is the initial treatment plan.          Goal Intervention Frequency Start Date End Date    Help patient explore past emotional issues in relation to present anxiety. Q2 Weeks 07/12/24 --    Intervention Details: Duration of treatment until until remission of symptoms.          Goal Intervention Frequency Start Date End Date    Help patient develop an awareness of their cognitive and physical responses to anxiety. Q2 Weeks 07/12/24 --    Intervention Details: Duration of treatment until until remission of symptoms.                  Problem: Depression  Start Date: 07/12/24      Problem Details: The patient self-scales this problem as a 4 with 10 being the worst.          Goal Priority Start Date Expected End Date End Date    Patient will demonstrate the ability to initiate new constructive life skills outside of sessions on a consistent basis. -- 07/12/24 -- --    Goal Details: Progress toward goal:  Not appropriate to rate progress toward goal since this is the initial treatment plan.          Goal Intervention Frequency Start Date End Date    Assist patient in setting attainable activities of daily living goals. PRN 07/12/24 --      Goal Intervention Frequency Start Date End Date    Provide education about depression PRN 07/12/24 --    Intervention Details: Duration of treatment until until remission of symptoms.          Goal Intervention Frequency Start Date End Date    Assist patient in developing healthy  coping strategies. Q2 Weeks 07/12/24 --    Intervention Details: Duration of treatment until until remission of symptoms.                  Problem: Relationship Issues  Start Date: 07/12/24      Problem Details: The patient self-scales this problem as a 6 with 10 being the worst.          Goal Priority Start Date Expected End Date End Date    Patient will initiate personal change to improve relationships. -- 07/12/24 -- --    Goal Details: Progress toward goal:  Not appropriate to rate progress toward goal since this is the initial treatment plan.          Goal Intervention Frequency Start Date End Date    Assist patient in identifying behaviors that focus on relationship building and process the changes needed to improve relationships. Q2 Weeks 07/12/24 --    Intervention Details: Duration of treatment until until remission of symptoms.                                 I have discussed and reviewed this treatment plan with the patient.  It has been printed for signatures.      No

## 2024-07-16 ENCOUNTER — APPOINTMENT (OUTPATIENT)
Dept: UROLOGY | Facility: CLINIC | Age: 49
End: 2024-07-16

## 2024-07-16 ENCOUNTER — RESULT REVIEW (OUTPATIENT)
Age: 49
End: 2024-07-16

## 2024-07-16 ENCOUNTER — APPOINTMENT (OUTPATIENT)
Dept: MAMMOGRAPHY | Facility: IMAGING CENTER | Age: 49
End: 2024-07-16
Payer: MEDICARE

## 2024-07-16 ENCOUNTER — OUTPATIENT (OUTPATIENT)
Dept: OUTPATIENT SERVICES | Facility: HOSPITAL | Age: 49
LOS: 1 days | End: 2024-07-16
Payer: MEDICARE

## 2024-07-16 DIAGNOSIS — N84.0 POLYP OF CORPUS UTERI: Chronic | ICD-10-CM

## 2024-07-16 DIAGNOSIS — D25.9 LEIOMYOMA OF UTERUS, UNSPECIFIED: Chronic | ICD-10-CM

## 2024-07-16 DIAGNOSIS — Z98.51 TUBAL LIGATION STATUS: Chronic | ICD-10-CM

## 2024-07-16 DIAGNOSIS — Z98.890 OTHER SPECIFIED POSTPROCEDURAL STATES: Chronic | ICD-10-CM

## 2024-07-16 DIAGNOSIS — Z98.1 ARTHRODESIS STATUS: Chronic | ICD-10-CM

## 2024-07-16 DIAGNOSIS — R92.8 OTHER ABNORMAL AND INCONCLUSIVE FINDINGS ON DIAGNOSTIC IMAGING OF BREAST: ICD-10-CM

## 2024-07-16 DIAGNOSIS — S82.90XA UNSPECIFIED FRACTURE OF UNSPECIFIED LOWER LEG, INITIAL ENCOUNTER FOR CLOSED FRACTURE: Chronic | ICD-10-CM

## 2024-07-16 PROCEDURE — 88305 TISSUE EXAM BY PATHOLOGIST: CPT | Mod: 26

## 2024-07-16 PROCEDURE — 77065 DX MAMMO INCL CAD UNI: CPT

## 2024-07-16 PROCEDURE — 19081 BX BREAST 1ST LESION STRTCTC: CPT | Mod: LT

## 2024-07-16 PROCEDURE — A4648: CPT

## 2024-07-16 PROCEDURE — 88305 TISSUE EXAM BY PATHOLOGIST: CPT

## 2024-07-16 PROCEDURE — 76098 X-RAY EXAM SURGICAL SPECIMEN: CPT | Mod: 26

## 2024-07-16 PROCEDURE — 77065 DX MAMMO INCL CAD UNI: CPT | Mod: 26,LT

## 2024-07-16 PROCEDURE — 19081 BX BREAST 1ST LESION STRTCTC: CPT

## 2024-07-25 ENCOUNTER — APPOINTMENT (OUTPATIENT)
Dept: NEUROLOGY | Facility: CLINIC | Age: 49
End: 2024-07-25
Payer: MEDICARE

## 2024-07-25 DIAGNOSIS — G43.709 CHRONIC MIGRAINE W/OUT AURA, NOT INTRACTABLE, W/OUT STATUS MIGRAINOSUS: ICD-10-CM

## 2024-07-25 PROCEDURE — 99215 OFFICE O/P EST HI 40 MIN: CPT | Mod: 95

## 2024-07-25 PROCEDURE — G2211 COMPLEX E/M VISIT ADD ON: CPT
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
## 2024-08-05 ENCOUNTER — APPOINTMENT (OUTPATIENT)
Dept: NEUROLOGY | Facility: CLINIC | Age: 49
End: 2024-08-05

## 2024-08-05 PROCEDURE — 64615 CHEMODENERV MUSC MIGRAINE: CPT

## 2024-08-05 PROCEDURE — 99215 OFFICE O/P EST HI 40 MIN: CPT | Mod: 25

## 2024-08-05 NOTE — PHYSICAL EXAM
[FreeTextEntry1] : Constitutional: alert. Psychiatric: oriented to person, place, and time. Neurologic:   Orientation: oriented to person, oriented to place and oriented to time.   Memory: short term memory intact.   Language: fluency intact.   Fund of knowledge: displays adequate knowledge of current events.   Cranial Nerves: visual acuity intact bilaterally, visual fields full to confrontation, pupils equal round and reactive to light, extraocular motion intact, facial sensation intact symmetrically, face symmetrical, hearing was intact bilaterally, head turning and shoulder shrug symmetric and there was no tongue deviation with protrusion.   Motor: muscle tone was normal in all four extremities and muscle strength was normal in all four extremities.   Sensory exam: light touch was intact.   Coordination:. normal gait. Finger to nose dysmetria was not present.   Deep tendon reflexes: Biceps right 1+. Biceps left 1+.  Patella right 1+. Patella left 1+.

## 2024-08-05 NOTE — PROCEDURE
[FreeTextEntry1] : consent obtained safety precautions discussed. botox 200 U 5u per muscle site:  procerusX1 corrugatorX2 frontalis X4 temporalis X8 occipitalisX6 trapeziusX6 155 u total were injected  45 u were discarded. pt tolerated procedure well.  no
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
## 2024-08-14 ENCOUNTER — APPOINTMENT (OUTPATIENT)
Dept: UROLOGY | Facility: CLINIC | Age: 49
End: 2024-08-14
Payer: MEDICARE

## 2024-08-14 VITALS — SYSTOLIC BLOOD PRESSURE: 122 MMHG | HEART RATE: 89 BPM | DIASTOLIC BLOOD PRESSURE: 78 MMHG | RESPIRATION RATE: 18 BRPM

## 2024-08-14 DIAGNOSIS — N30.10 INTERSTITIAL CYSTITIS (CHRONIC) W/OUT HEMATURIA: ICD-10-CM

## 2024-08-14 PROCEDURE — 51700 IRRIGATION OF BLADDER: CPT

## 2024-08-15 ENCOUNTER — NON-APPOINTMENT (OUTPATIENT)
Age: 49
End: 2024-08-15

## 2024-08-19 ENCOUNTER — LABORATORY RESULT (OUTPATIENT)
Age: 49
End: 2024-08-19

## 2024-08-19 ENCOUNTER — APPOINTMENT (OUTPATIENT)
Dept: OBGYN | Facility: CLINIC | Age: 49
End: 2024-08-19
Payer: MEDICARE

## 2024-08-19 DIAGNOSIS — Z01.419 ENCOUNTER FOR GYNECOLOGICAL EXAMINATION (GENERAL) (ROUTINE) W/OUT ABNORMAL FINDINGS: ICD-10-CM

## 2024-08-19 PROCEDURE — 99396 PREV VISIT EST AGE 40-64: CPT

## 2024-08-19 NOTE — HISTORY OF PRESENT ILLNESS
[FreeTextEntry1] : pt comes for annual exam . She is still on tamoxifen, saw the oncologist . Has no complains .

## 2024-08-19 NOTE — PLAN
[FreeTextEntry1] : pap ,annual , calcium , pelvic ultrasound to evaluate endometrium because of the tamoxifen .

## 2024-08-20 ENCOUNTER — APPOINTMENT (OUTPATIENT)
Dept: UROLOGY | Facility: CLINIC | Age: 49
End: 2024-08-20
Payer: MEDICARE

## 2024-08-20 VITALS
OXYGEN SATURATION: 97 % | DIASTOLIC BLOOD PRESSURE: 75 MMHG | TEMPERATURE: 98 F | SYSTOLIC BLOOD PRESSURE: 114 MMHG | BODY MASS INDEX: 24.14 KG/M2 | HEART RATE: 76 BPM | HEIGHT: 58 IN | WEIGHT: 115 LBS | RESPIRATION RATE: 16 BRPM

## 2024-08-20 DIAGNOSIS — R10.2 PELVIC AND PERINEAL PAIN: ICD-10-CM

## 2024-08-20 DIAGNOSIS — N30.10 INTERSTITIAL CYSTITIS (CHRONIC) W/OUT HEMATURIA: ICD-10-CM

## 2024-08-20 PROCEDURE — 51700 IRRIGATION OF BLADDER: CPT

## 2024-08-25 ENCOUNTER — OUTPATIENT (OUTPATIENT)
Dept: OUTPATIENT SERVICES | Facility: HOSPITAL | Age: 49
LOS: 1 days | Discharge: HOME | End: 2024-08-25

## 2024-08-25 DIAGNOSIS — Z98.890 OTHER SPECIFIED POSTPROCEDURAL STATES: Chronic | ICD-10-CM

## 2024-08-25 DIAGNOSIS — C50.919 MALIGNANT NEOPLASM OF UNSPECIFIED SITE OF UNSPECIFIED FEMALE BREAST: ICD-10-CM

## 2024-08-25 DIAGNOSIS — Z98.51 TUBAL LIGATION STATUS: Chronic | ICD-10-CM

## 2024-08-25 DIAGNOSIS — D25.9 LEIOMYOMA OF UTERUS, UNSPECIFIED: Chronic | ICD-10-CM

## 2024-08-25 DIAGNOSIS — S82.90XA UNSPECIFIED FRACTURE OF UNSPECIFIED LOWER LEG, INITIAL ENCOUNTER FOR CLOSED FRACTURE: Chronic | ICD-10-CM

## 2024-08-25 DIAGNOSIS — N84.0 POLYP OF CORPUS UTERI: Chronic | ICD-10-CM

## 2024-08-25 DIAGNOSIS — Z98.1 ARTHRODESIS STATUS: Chronic | ICD-10-CM

## 2024-09-03 ENCOUNTER — APPOINTMENT (OUTPATIENT)
Dept: UROLOGY | Facility: CLINIC | Age: 49
End: 2024-09-03
Payer: MEDICARE

## 2024-09-03 VITALS
HEIGHT: 58 IN | HEART RATE: 82 BPM | OXYGEN SATURATION: 99 % | WEIGHT: 115 LBS | BODY MASS INDEX: 24.14 KG/M2 | RESPIRATION RATE: 16 BRPM | DIASTOLIC BLOOD PRESSURE: 72 MMHG | SYSTOLIC BLOOD PRESSURE: 132 MMHG

## 2024-09-03 DIAGNOSIS — N39.0 URINARY TRACT INFECTION, SITE NOT SPECIFIED: ICD-10-CM

## 2024-09-03 DIAGNOSIS — N30.10 INTERSTITIAL CYSTITIS (CHRONIC) W/OUT HEMATURIA: ICD-10-CM

## 2024-09-03 DIAGNOSIS — R10.2 PELVIC AND PERINEAL PAIN: ICD-10-CM

## 2024-09-03 PROCEDURE — 51700 IRRIGATION OF BLADDER: CPT

## 2024-09-04 ENCOUNTER — NON-APPOINTMENT (OUTPATIENT)
Age: 49
End: 2024-09-04

## 2024-09-05 ENCOUNTER — APPOINTMENT (OUTPATIENT)
Dept: ELECTROPHYSIOLOGY | Facility: CLINIC | Age: 49
End: 2024-09-05

## 2024-09-05 ENCOUNTER — APPOINTMENT (OUTPATIENT)
Dept: CARDIOLOGY | Facility: CLINIC | Age: 49
End: 2024-09-05
Payer: MEDICARE

## 2024-09-05 VITALS
SYSTOLIC BLOOD PRESSURE: 125 MMHG | OXYGEN SATURATION: 99 % | HEART RATE: 88 BPM | WEIGHT: 115 LBS | BODY MASS INDEX: 24.04 KG/M2 | DIASTOLIC BLOOD PRESSURE: 84 MMHG

## 2024-09-05 DIAGNOSIS — I49.1 ATRIAL PREMATURE DEPOLARIZATION: ICD-10-CM

## 2024-09-05 PROCEDURE — 93000 ELECTROCARDIOGRAM COMPLETE: CPT

## 2024-09-05 PROCEDURE — 99214 OFFICE O/P EST MOD 30 MIN: CPT | Mod: 25

## 2024-09-05 NOTE — DISCUSSION/SUMMARY
[EKG obtained to assist in diagnosis and management of assessed problem(s)] : EKG obtained to assist in diagnosis and management of assessed problem(s) [FreeTextEntry1] : Ms. HERNAN ABREU 49 year - old F presents in s/p recent hospitalization with chest pain s/p cath is here for follow up in the [facility]. 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 At the present time Denies chest pain, shortness of breath, dizziness, lightheadedness, palpitations or near syncope or syncope, orthopnea, PND and increasing lower extremity edema. Denies any prior history of MI, CAD, heart failure, stroke, TIA, DVT, Pulmonary embolism, and exertional dyspnea or calf pain. Also denies fevers, chills, weight change, recent travel or nocturnal awakenings or daytime somnolence.  interval note 9/5/24  has been having atypical short lasting chest pains nonreproducible went to her PMD yesterday and her ECG had frequent PACs no other symptoms has been having a lot of soda lately has not been compliant with her lipitor  # CAD: Cardiac cath 3/2024  LAD -15% stenosis  Continue Asa 81 mg QD  Add Atorvastatin 10  mg QHS - encouraged to stay compliant with the meds PACs noted on ECG - holter monitor will be placed to assess if the PACs are the cause of her discomfort encouraged to stay away from stimulants and stay hydrated  # HLD: Encouraged patient to continue healthy exercise and eating habits, focusing on a Mediterranean style of eating and aiming for the recommended 150 minutes per week of moderate physical activity. Start Atorvastatin 10 mg QHS with COQ -10 mg 200 mg QD  Encouraged patient to continue healthy exercise and eating habits, focusing on a Mediterranean style of eating and aiming for the recommended 150 minutes per week of moderate physical activity.   # Hypertriglyceridemia: Continue Lovaza  Encouraged patient to continue healthy exercise and eating habits, focusing on a Mediterranean style of eating and aiming for the recommended 150 minutes per week of moderate physical activity.

## 2024-09-05 NOTE — HISTORY OF PRESENT ILLNESS
[FreeTextEntry1] : Ms. HERNAN ABREU 49 year - old F presents in s/p recent hospitalization with chest pain s/p cath is here for follow up in the [facility] program. 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 At the present time Denies chest pain, shortness of breath, dizziness, lightheadedness, palpitations or near syncope or syncope, orthopnea, PND and increasing lower extremity edema. Denies any prior history of MI, CAD, heart failure, stroke, TIA, DVT, Pulmonary embolism, and exertional dyspnea or calf pain. Also denies fevers, chills, weight change, recent travel or nocturnal awakenings or daytime somnolence.  interval note 9/5/24  has been having atypical short lasting chest pains nonreproducible went to her PMD yesterday and her ECG had frequent PACs no other symptoms has been having a lot of soda lately has not been compliant with her lipitor  # CAD: Cardiac cath 3/2024  LAD -15% stenosis  Continue Asa 81 mg QD  Add Atorvastatin 10  mg QHS - encouraged to stay compliant with the meds PACs noted on ECG - holter monitor will be placed to assess if the PACs are the cause of her discomfort encouraged to stay away from stimulants and stay hydrated  # HLD: Encouraged patient to continue healthy exercise and eating habits, focusing on a Mediterranean style of eating and aiming for the recommended 150 minutes per week of moderate physical activity. Start Atorvastatin 10 mg QHS with COQ -10 mg 200 mg QD  Encouraged patient to continue healthy exercise and eating habits, focusing on a Mediterranean style of eating and aiming for the recommended 150 minutes per week of moderate physical activity.   # Hypertriglyceridemia: Continue Lovaza  Encouraged patient to continue healthy exercise and eating habits, focusing on a Mediterranean style of eating and aiming for the recommended 150 minutes per week of moderate physical activity.

## 2024-09-05 NOTE — CARDIOLOGY SUMMARY
[de-identified] : 9/5/24 SR 80s [de-identified] : erfusion: Qualitative Findings:   --There are small, mild defects in the distal inferior and distal inferolateral walls that are partially reversible suggestive of mild ischemia with partial scarring. --There are small, mild defects in the basal inferior and basal septal walls that are mostly fixed suggestive of scar with minimal ischemia.   Left Ventricular Motion: Post-stress gated wall motion analysis revealed reduced systolic thickening of the basal and distal inferior, distal inferolateral, and basal septal walls. Overall left ventricular ejection fraction is normal. ---------------------------------------------------------------------------------------------------------------------------------------------------------   Ventricular Function: The left ventricle is small in size. The post stress left ventricular EF% is 66 %. The post stress end diastolic volume is 49 ml and systolic volume is 17 ml.   Interpreting Provider: Electronically signed on 1/21/2024 at 2:31:44 PM by Flex Pan   [de-identified] :  INTERPRETATION:  A focused transthoracic cardiac ultrasound examination was performed. No pericardial effusion was present. No global wall motion abnormality was identified Preserved cardiac contractility  IMPRESSION: No Pericardial Effusion. [de-identified] : Diagnostic Findings:   Coronary Angiography  The coronary circulation is right dominant.    LM  Left main artery: Angiography shows no disease.    LAD  Left anterior descending artery: Angiography shows minor irregularities. First diagonal: There is a 15 % stenosis.  CX  Circumflex: Angiography shows no disease.    RCA   Patient: HERNAN ABREU            MRN: [id number] Study Date: 01/22/2024   12:50 PM      Page 1 of 3

## 2024-09-09 ENCOUNTER — APPOINTMENT (OUTPATIENT)
Dept: HEMATOLOGY ONCOLOGY | Facility: CLINIC | Age: 49
End: 2024-09-09

## 2024-09-17 ENCOUNTER — APPOINTMENT (OUTPATIENT)
Dept: UROLOGY | Facility: CLINIC | Age: 49
End: 2024-09-17
Payer: MEDICARE

## 2024-09-17 DIAGNOSIS — N39.0 URINARY TRACT INFECTION, SITE NOT SPECIFIED: ICD-10-CM

## 2024-09-17 DIAGNOSIS — N30.10 INTERSTITIAL CYSTITIS (CHRONIC) W/OUT HEMATURIA: ICD-10-CM

## 2024-09-17 PROCEDURE — 51700 IRRIGATION OF BLADDER: CPT

## 2024-09-29 ENCOUNTER — NON-APPOINTMENT (OUTPATIENT)
Age: 49
End: 2024-09-29

## 2024-10-01 ENCOUNTER — APPOINTMENT (OUTPATIENT)
Dept: UROLOGY | Facility: CLINIC | Age: 49
End: 2024-10-01

## 2024-10-01 PROCEDURE — 93244 EXT ECG>48HR<7D REV&INTERPJ: CPT

## 2024-10-05 NOTE — ASU PATIENT PROFILE, ADULT - NS TRANSFER PATIENT BELONGINGS
Post-Op Assessment Note    CV Status:  Stable    Pain management: adequate       Mental Status:  Alert and awake   Hydration Status:  Euvolemic   PONV Controlled:  Controlled   Airway Patency:  Patent     Post Op Vitals Reviewed: Yes    No anethesia notable event occurred.    Staff: Anesthesiologist               /84 (10/05/24 0947)    Temp (!) 97.4 °F (36.3 °C) (10/05/24 0947)    Pulse 75 (10/05/24 0947)   Resp 16 (10/05/24 0947)    SpO2 98 % (10/05/24 0947)       Clothing

## 2024-10-14 ENCOUNTER — APPOINTMENT (OUTPATIENT)
Dept: ORTHOPEDIC SURGERY | Facility: CLINIC | Age: 49
End: 2024-10-14

## 2024-10-21 ENCOUNTER — OUTPATIENT (OUTPATIENT)
Dept: OUTPATIENT SERVICES | Facility: HOSPITAL | Age: 49
LOS: 1 days | Discharge: HOME | End: 2024-10-21

## 2024-10-21 DIAGNOSIS — Z98.51 TUBAL LIGATION STATUS: Chronic | ICD-10-CM

## 2024-10-21 DIAGNOSIS — C50.919 MALIGNANT NEOPLASM OF UNSPECIFIED SITE OF UNSPECIFIED FEMALE BREAST: ICD-10-CM

## 2024-10-21 DIAGNOSIS — Z98.1 ARTHRODESIS STATUS: Chronic | ICD-10-CM

## 2024-10-21 DIAGNOSIS — Z98.890 OTHER SPECIFIED POSTPROCEDURAL STATES: Chronic | ICD-10-CM

## 2024-10-23 ENCOUNTER — RESULT REVIEW (OUTPATIENT)
Age: 49
End: 2024-10-23

## 2024-10-23 ENCOUNTER — APPOINTMENT (OUTPATIENT)
Dept: HEMATOLOGY ONCOLOGY | Facility: CLINIC | Age: 49
End: 2024-10-23
Payer: MEDICARE

## 2024-10-23 VITALS
BODY MASS INDEX: 24.74 KG/M2 | HEART RATE: 90 BPM | SYSTOLIC BLOOD PRESSURE: 95 MMHG | WEIGHT: 118.39 LBS | DIASTOLIC BLOOD PRESSURE: 62 MMHG | RESPIRATION RATE: 16 BRPM | TEMPERATURE: 97 F | OXYGEN SATURATION: 97 %

## 2024-10-23 DIAGNOSIS — Z91.89 OTHER SPECIFIED PERSONAL RISK FACTORS, NOT ELSEWHERE CLASSIFIED: ICD-10-CM

## 2024-10-23 DIAGNOSIS — N60.92 UNSPECIFIED BENIGN MAMMARY DYSPLASIA OF LEFT BREAST: ICD-10-CM

## 2024-10-23 LAB
BASOPHILS # BLD AUTO: 0.03 K/UL — SIGNIFICANT CHANGE UP (ref 0–0.2)
BASOPHILS NFR BLD AUTO: 0.5 % — SIGNIFICANT CHANGE UP (ref 0–2)
EOSINOPHIL # BLD AUTO: 0.13 K/UL — SIGNIFICANT CHANGE UP (ref 0–0.5)
EOSINOPHIL NFR BLD AUTO: 2.2 % — SIGNIFICANT CHANGE UP (ref 0–6)
HCT VFR BLD CALC: 35.8 % — SIGNIFICANT CHANGE UP (ref 34.5–45)
HGB BLD-MCNC: 11.4 G/DL — LOW (ref 11.5–15.5)
IMM GRANULOCYTES NFR BLD AUTO: 0.3 % — SIGNIFICANT CHANGE UP (ref 0–0.9)
LYMPHOCYTES # BLD AUTO: 1.93 K/UL — SIGNIFICANT CHANGE UP (ref 1–3.3)
LYMPHOCYTES # BLD AUTO: 33.2 % — SIGNIFICANT CHANGE UP (ref 13–44)
MCHC RBC-ENTMCNC: 29.1 PG — SIGNIFICANT CHANGE UP (ref 27–34)
MCHC RBC-ENTMCNC: 31.8 G/DL — LOW (ref 32–36)
MCV RBC AUTO: 91.3 FL — SIGNIFICANT CHANGE UP (ref 80–100)
MONOCYTES # BLD AUTO: 0.44 K/UL — SIGNIFICANT CHANGE UP (ref 0–0.9)
MONOCYTES NFR BLD AUTO: 7.6 % — SIGNIFICANT CHANGE UP (ref 2–14)
NEUTROPHILS # BLD AUTO: 3.27 K/UL — SIGNIFICANT CHANGE UP (ref 1.8–7.4)
NEUTROPHILS NFR BLD AUTO: 56.2 % — SIGNIFICANT CHANGE UP (ref 43–77)
NRBC # BLD: 0 /100 WBCS — SIGNIFICANT CHANGE UP (ref 0–0)
NRBC BLD-RTO: 0 /100 WBCS — SIGNIFICANT CHANGE UP (ref 0–0)
PLATELET # BLD AUTO: 188 K/UL — SIGNIFICANT CHANGE UP (ref 150–400)
RBC # BLD: 3.92 M/UL — SIGNIFICANT CHANGE UP (ref 3.8–5.2)
RBC # FLD: 13 % — SIGNIFICANT CHANGE UP (ref 10.3–14.5)
WBC # BLD: 5.82 K/UL — SIGNIFICANT CHANGE UP (ref 3.8–10.5)
WBC # FLD AUTO: 5.82 K/UL — SIGNIFICANT CHANGE UP (ref 3.8–10.5)

## 2024-10-23 PROCEDURE — 99214 OFFICE O/P EST MOD 30 MIN: CPT

## 2024-10-23 PROCEDURE — G2211 COMPLEX E/M VISIT ADD ON: CPT

## 2024-10-24 ENCOUNTER — APPOINTMENT (OUTPATIENT)
Dept: UROLOGY | Facility: CLINIC | Age: 49
End: 2024-10-24
Payer: MEDICARE

## 2024-10-24 VITALS
OXYGEN SATURATION: 97 % | SYSTOLIC BLOOD PRESSURE: 121 MMHG | DIASTOLIC BLOOD PRESSURE: 84 MMHG | HEART RATE: 87 BPM | BODY MASS INDEX: 24.77 KG/M2 | WEIGHT: 118 LBS | HEIGHT: 58 IN | RESPIRATION RATE: 16 BRPM

## 2024-10-24 DIAGNOSIS — N30.10 INTERSTITIAL CYSTITIS (CHRONIC) W/OUT HEMATURIA: ICD-10-CM

## 2024-10-24 PROCEDURE — 51700 IRRIGATION OF BLADDER: CPT

## 2024-10-25 ENCOUNTER — NON-APPOINTMENT (OUTPATIENT)
Age: 49
End: 2024-10-25

## 2024-10-25 LAB
APPEARANCE: CLEAR
BACTERIA: NEGATIVE /HPF
BILIRUBIN URINE: NEGATIVE
BLOOD URINE: NEGATIVE
CAST: 0 /LPF
COLOR: YELLOW
EPITHELIAL CELLS: 2 /HPF
GLUCOSE QUALITATIVE U: NEGATIVE MG/DL
KETONES URINE: NEGATIVE MG/DL
LEUKOCYTE ESTERASE URINE: NEGATIVE
MICROSCOPIC-UA: NORMAL
NITRITE URINE: NEGATIVE
PH URINE: 6
PROTEIN URINE: NEGATIVE MG/DL
RED BLOOD CELLS URINE: 0 /HPF
SPECIFIC GRAVITY URINE: >1.03
UROBILINOGEN URINE: 0.2 MG/DL
WHITE BLOOD CELLS URINE: 0 /HPF

## 2024-10-28 ENCOUNTER — NON-APPOINTMENT (OUTPATIENT)
Age: 49
End: 2024-10-28

## 2024-10-28 ENCOUNTER — APPOINTMENT (OUTPATIENT)
Dept: HEMATOLOGY ONCOLOGY | Facility: CLINIC | Age: 49
End: 2024-10-28

## 2024-10-28 LAB — BACTERIA UR CULT: NORMAL

## 2024-10-31 LAB
ALBUMIN SERPL ELPH-MCNC: 4.1 G/DL
ALP BLD-CCNC: 100 U/L
ALT SERPL-CCNC: 70 U/L
ANION GAP SERPL CALC-SCNC: 14 MMOL/L
AST SERPL-CCNC: 64 U/L
BILIRUB SERPL-MCNC: 0.2 MG/DL
BUN SERPL-MCNC: 14 MG/DL
CALCIUM SERPL-MCNC: 9.2 MG/DL
CHLORIDE SERPL-SCNC: 103 MMOL/L
CHOLEST SERPL-MCNC: 126 MG/DL
CO2 SERPL-SCNC: 21 MMOL/L
CREAT SERPL-MCNC: 0.42 MG/DL
EGFR: 119 ML/MIN/1.73M2
ESTRADIOL SERPL-MCNC: 15 PG/ML
FSH SERPL-MCNC: 23.3 IU/L
GLUCOSE SERPL-MCNC: 128 MG/DL
HDLC SERPL-MCNC: 27 MG/DL
LDLC SERPL CALC-MCNC: 30 MG/DL
NONHDLC SERPL-MCNC: 99 MG/DL
POTASSIUM SERPL-SCNC: 4 MMOL/L
PROT SERPL-MCNC: 6.5 G/DL
SODIUM SERPL-SCNC: 139 MMOL/L
TRIGL SERPL-MCNC: 499 MG/DL

## 2024-11-04 ENCOUNTER — APPOINTMENT (OUTPATIENT)
Dept: NEUROLOGY | Facility: CLINIC | Age: 49
End: 2024-11-04
Payer: MEDICARE

## 2024-11-04 VITALS
BODY MASS INDEX: 24.77 KG/M2 | HEIGHT: 58 IN | DIASTOLIC BLOOD PRESSURE: 79 MMHG | WEIGHT: 118 LBS | SYSTOLIC BLOOD PRESSURE: 114 MMHG | HEART RATE: 92 BPM

## 2024-11-04 DIAGNOSIS — G43.709 CHRONIC MIGRAINE W/OUT AURA, NOT INTRACTABLE, W/OUT STATUS MIGRAINOSUS: ICD-10-CM

## 2024-11-04 PROCEDURE — 99215 OFFICE O/P EST HI 40 MIN: CPT | Mod: 25

## 2024-11-04 PROCEDURE — 64615 CHEMODENERV MUSC MIGRAINE: CPT

## 2024-11-14 ENCOUNTER — APPOINTMENT (OUTPATIENT)
Dept: OBGYN | Facility: CLINIC | Age: 49
End: 2024-11-14
Payer: MEDICARE

## 2024-11-14 ENCOUNTER — APPOINTMENT (OUTPATIENT)
Dept: UROLOGY | Facility: CLINIC | Age: 49
End: 2024-11-14

## 2024-11-14 VITALS — WEIGHT: 117 LBS | BODY MASS INDEX: 24.45 KG/M2 | DIASTOLIC BLOOD PRESSURE: 88 MMHG | SYSTOLIC BLOOD PRESSURE: 129 MMHG

## 2024-11-14 DIAGNOSIS — R93.89 ABNORMAL FINDINGS ON DIAGNOSTIC IMAGING OF OTHER SPECIFIED BODY STRUCTURES: ICD-10-CM

## 2024-11-14 DIAGNOSIS — N76.2 ACUTE VULVITIS: ICD-10-CM

## 2024-11-14 DIAGNOSIS — N30.10 INTERSTITIAL CYSTITIS (CHRONIC) W/OUT HEMATURIA: ICD-10-CM

## 2024-11-14 PROCEDURE — 51700 IRRIGATION OF BLADDER: CPT

## 2024-11-14 RX ORDER — CLOTRIMAZOLE AND BETAMETHASONE DIPROPIONATE 10; .5 MG/G; MG/G
1-0.05 CREAM TOPICAL TWICE DAILY
Qty: 1 | Refills: 0 | Status: ACTIVE | COMMUNITY
Start: 2024-11-14 | End: 1900-01-01
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
## 2024-11-16 PROBLEM — R93.89 THICKENED ENDOMETRIUM: Status: ACTIVE | Noted: 2024-11-16

## 2024-11-21 ENCOUNTER — APPOINTMENT (OUTPATIENT)
Dept: UROLOGY | Facility: CLINIC | Age: 49
End: 2024-11-21

## 2024-12-05 ENCOUNTER — APPOINTMENT (OUTPATIENT)
Dept: UROLOGY | Facility: CLINIC | Age: 49
End: 2024-12-05
Payer: MEDICARE

## 2024-12-05 VITALS
TEMPERATURE: 98 F | HEIGHT: 58 IN | DIASTOLIC BLOOD PRESSURE: 78 MMHG | RESPIRATION RATE: 16 BRPM | BODY MASS INDEX: 24.56 KG/M2 | WEIGHT: 117 LBS | SYSTOLIC BLOOD PRESSURE: 115 MMHG | HEART RATE: 82 BPM | OXYGEN SATURATION: 97 %

## 2024-12-05 DIAGNOSIS — N30.10 INTERSTITIAL CYSTITIS (CHRONIC) W/OUT HEMATURIA: ICD-10-CM

## 2024-12-05 PROCEDURE — 51700 IRRIGATION OF BLADDER: CPT

## 2024-12-17 ENCOUNTER — APPOINTMENT (OUTPATIENT)
Dept: UROLOGY | Facility: CLINIC | Age: 49
End: 2024-12-17

## 2024-12-17 DIAGNOSIS — N30.10 INTERSTITIAL CYSTITIS (CHRONIC) W/OUT HEMATURIA: ICD-10-CM

## 2024-12-30 ENCOUNTER — OUTPATIENT (OUTPATIENT)
Dept: OUTPATIENT SERVICES | Facility: HOSPITAL | Age: 49
LOS: 1 days | End: 2024-12-30
Payer: MEDICARE

## 2024-12-30 ENCOUNTER — APPOINTMENT (OUTPATIENT)
Dept: CARDIOLOGY | Facility: CLINIC | Age: 49
End: 2024-12-30
Payer: MEDICARE

## 2024-12-30 VITALS
DIASTOLIC BLOOD PRESSURE: 81 MMHG | HEART RATE: 83 BPM | SYSTOLIC BLOOD PRESSURE: 117 MMHG | HEIGHT: 59 IN | TEMPERATURE: 98 F | WEIGHT: 110.01 LBS | OXYGEN SATURATION: 97 % | RESPIRATION RATE: 15 BRPM

## 2024-12-30 VITALS
HEIGHT: 58 IN | WEIGHT: 105 LBS | DIASTOLIC BLOOD PRESSURE: 78 MMHG | SYSTOLIC BLOOD PRESSURE: 112 MMHG | OXYGEN SATURATION: 96 % | HEART RATE: 79 BPM | BODY MASS INDEX: 22.04 KG/M2

## 2024-12-30 DIAGNOSIS — I25.10 ATHEROSCLEROTIC HEART DISEASE OF NATIVE CORONARY ARTERY W/OUT ANGINA PECTORIS: ICD-10-CM

## 2024-12-30 DIAGNOSIS — Z98.51 TUBAL LIGATION STATUS: Chronic | ICD-10-CM

## 2024-12-30 DIAGNOSIS — G43.909 MIGRAINE, UNSPECIFIED, NOT INTRACTABLE, WITHOUT STATUS MIGRAINOSUS: ICD-10-CM

## 2024-12-30 DIAGNOSIS — Z98.1 ARTHRODESIS STATUS: Chronic | ICD-10-CM

## 2024-12-30 DIAGNOSIS — Z87.898 PERSONAL HISTORY OF OTHER SPECIFIED CONDITIONS: ICD-10-CM

## 2024-12-30 DIAGNOSIS — E78.5 HYPERLIPIDEMIA, UNSPECIFIED: ICD-10-CM

## 2024-12-30 DIAGNOSIS — Z29.9 ENCOUNTER FOR PROPHYLACTIC MEASURES, UNSPECIFIED: ICD-10-CM

## 2024-12-30 DIAGNOSIS — Z98.890 OTHER SPECIFIED POSTPROCEDURAL STATES: Chronic | ICD-10-CM

## 2024-12-30 DIAGNOSIS — R93.89 ABNORMAL FINDINGS ON DIAGNOSTIC IMAGING OF OTHER SPECIFIED BODY STRUCTURES: ICD-10-CM

## 2024-12-30 DIAGNOSIS — Z91.89 OTHER SPECIFIED PERSONAL RISK FACTORS, NOT ELSEWHERE CLASSIFIED: ICD-10-CM

## 2024-12-30 DIAGNOSIS — S82.90XA UNSPECIFIED FRACTURE OF UNSPECIFIED LOWER LEG, INITIAL ENCOUNTER FOR CLOSED FRACTURE: Chronic | ICD-10-CM

## 2024-12-30 DIAGNOSIS — Z01.818 ENCOUNTER FOR OTHER PREPROCEDURAL EXAMINATION: ICD-10-CM

## 2024-12-30 DIAGNOSIS — L40.9 PSORIASIS, UNSPECIFIED: ICD-10-CM

## 2024-12-30 DIAGNOSIS — F41.9 ANXIETY DISORDER, UNSPECIFIED: ICD-10-CM

## 2024-12-30 DIAGNOSIS — N84.0 POLYP OF CORPUS UTERI: Chronic | ICD-10-CM

## 2024-12-30 DIAGNOSIS — D25.9 LEIOMYOMA OF UTERUS, UNSPECIFIED: Chronic | ICD-10-CM

## 2024-12-30 LAB
ANION GAP SERPL CALC-SCNC: 5 MMOL/L — SIGNIFICANT CHANGE UP (ref 5–17)
APTT BLD: 30 SEC — SIGNIFICANT CHANGE UP (ref 24.5–35.6)
BLD GP AB SCN SERPL QL: SIGNIFICANT CHANGE UP
BUN SERPL-MCNC: 11 MG/DL — SIGNIFICANT CHANGE UP (ref 7–18)
CALCIUM SERPL-MCNC: 9.3 MG/DL — SIGNIFICANT CHANGE UP (ref 8.4–10.5)
CHLORIDE SERPL-SCNC: 108 MMOL/L — SIGNIFICANT CHANGE UP (ref 96–108)
CO2 SERPL-SCNC: 26 MMOL/L — SIGNIFICANT CHANGE UP (ref 22–31)
CREAT SERPL-MCNC: 0.45 MG/DL — LOW (ref 0.5–1.3)
EGFR: 117 ML/MIN/1.73M2 — SIGNIFICANT CHANGE UP
GLUCOSE SERPL-MCNC: 115 MG/DL — HIGH (ref 70–99)
HCT VFR BLD CALC: 36.9 % — SIGNIFICANT CHANGE UP (ref 34.5–45)
HGB BLD-MCNC: 12 G/DL — SIGNIFICANT CHANGE UP (ref 11.5–15.5)
INR BLD: 1.02 RATIO — SIGNIFICANT CHANGE UP (ref 0.85–1.16)
MCHC RBC-ENTMCNC: 29.3 PG — SIGNIFICANT CHANGE UP (ref 27–34)
MCHC RBC-ENTMCNC: 32.5 G/DL — SIGNIFICANT CHANGE UP (ref 32–36)
MCV RBC AUTO: 90 FL — SIGNIFICANT CHANGE UP (ref 80–100)
NRBC # BLD: 0 /100 WBCS — SIGNIFICANT CHANGE UP (ref 0–0)
PLATELET # BLD AUTO: 164 K/UL — SIGNIFICANT CHANGE UP (ref 150–400)
POTASSIUM SERPL-MCNC: 4.8 MMOL/L — SIGNIFICANT CHANGE UP (ref 3.5–5.3)
POTASSIUM SERPL-SCNC: 4.8 MMOL/L — SIGNIFICANT CHANGE UP (ref 3.5–5.3)
PROTHROM AB SERPL-ACNC: 11.9 SEC — SIGNIFICANT CHANGE UP (ref 9.9–13.4)
RBC # BLD: 4.1 M/UL — SIGNIFICANT CHANGE UP (ref 3.8–5.2)
RBC # FLD: 13.1 % — SIGNIFICANT CHANGE UP (ref 10.3–14.5)
SODIUM SERPL-SCNC: 139 MMOL/L — SIGNIFICANT CHANGE UP (ref 135–145)
WBC # BLD: 6.49 K/UL — SIGNIFICANT CHANGE UP (ref 3.8–10.5)
WBC # FLD AUTO: 6.49 K/UL — SIGNIFICANT CHANGE UP (ref 3.8–10.5)

## 2024-12-30 PROCEDURE — 99214 OFFICE O/P EST MOD 30 MIN: CPT

## 2024-12-30 PROCEDURE — G2211 COMPLEX E/M VISIT ADD ON: CPT

## 2024-12-30 PROCEDURE — 93000 ELECTROCARDIOGRAM COMPLETE: CPT

## 2024-12-30 NOTE — H&P PST ADULT - EAR CANAL L
Name: Essie Asher: Mobile-XL   : 1924 Admit Date: 10/29/2019   Phone: [phone]  Room: [facility]/   PCP: Deyanira Banegas MD  MRN: [id number]   Date: 2019  Code: DNR          Chart and notes reviewed. Data reviewed. I review the patient's current medications in the medical record at each encounter. I have evaluated and examined the patient. History of Present Illness:  Mr. Liliana Hebert is a 78 yo gentleman with a history of prior CVA, afib s/p PPM, CAD, and CKD who is admitted with acute hypoxic and hypercapnic respiratory failure. Patient is unable to provide any history and history was obtained via chart review and from other medical providers. He has been short of breath for about a week. Noted to have pedal edema on exam and CXR with effusion and pulmonary edema. Placed on BiPAP in the ED for increased work of breathing and now appears comfortable. Labs: WBC 6.1, cr 1.75, proBNP 3677, troponin <0.05    Images:  CXR with bilateral effusions and pulmonary edema    Interval history  Afebrile  Sats 96% on 1L; was also on RA yesterday  Creat 2.33 - better today, but higher than baseline  10/29 repeat ABG 7.39/45/69/27 on 3L  RVP neg  Blood cx no growth x3 days  Pna work up negative for acute process  I/O: not accurately documented  ECHO: EF 50-68%; grade 1 diastolic dysfunction; mild to mod AVR    ROS: Awake and alert. Has no complaints other than feeling a little tired. Denies SOB. Denies fever or chills. Denies CP. Denies abd pain. LE/feet swelling better.       Past Medical History:   Diagnosis Date    Allergic rhinitis     Arthritis     Atrial fibrillation (HCC)     Benign prostatic hyperplasia     CAD (coronary artery disease)     Cerebral infarction (HCC)     Chronic kidney disease     Chronic pain     Dry eye syndrome     Dysphagia     Glaucoma     Gout     Hearing loss, bilateral     Hemiplegia ([address]     left side    Hyperlipemia     Hypertension     Major depressive disorder     Osteoarthritis     Overactive bladder     Pacemaker     Polyarthritis     Urinary incontinence     Vitamin D deficiency        History reviewed. No pertinent surgical history. History reviewed. No pertinent family history.     Social History     Tobacco Use    Smoking status: Not on file   Substance Use Topics    Alcohol use: Not on file       No Known Allergies    Current Facility-Administered Medications   Medication Dose Route Frequency    doxycycline (VIBRA-TABS) tablet 100 mg  100 mg Oral Q12H    amLODIPine (NORVASC) tablet 5 mg  5 mg Oral DAILY    piperacillin-tazobactam (ZOSYN) 3.375 g in 0.9% sodium chloride (MBP/ADV) 100 mL  3.375 g IntraVENous Q12H    sodium chloride (NS) flush 5-40 mL  5-40 mL IntraVENous Q8H    sodium chloride (NS) flush 5-40 mL  5-40 mL IntraVENous PRN    sodium chloride (NS) flush 5-40 mL  5-40 mL IntraVENous Q8H    sodium chloride (NS) flush 5-40 mL  5-40 mL IntraVENous PRN    acetaminophen (TYLENOL) tablet 650 mg  650 mg Oral Q6H PRN    naloxone (NARCAN) injection 0.4 mg  0.4 mg IntraVENous PRN    albuterol-ipratropium (DUO-NEB) 2.5 MG-0.5 MG/3 ML  3 mL Nebulization Q6H PRN    apixaban (ELIQUIS) tablet 2.5 mg  2.5 mg Oral BID    carboxymethylcellulose sodium (CELLUVISC) 1 % ophthalmic solution 2 Drop  2 Drop Both Eyes DAILY PRN    carvedilol (COREG) tablet 3.125 mg  3.125 mg Oral BID WITH MEALS    cholecalciferol (VITAMIN D3) (1000 Units /25 mcg) tablet 1 Tab  1,000 Units Oral DAILY    colchicine tablet 0.6 mg  0.6 mg Oral BID PRN    loratadine (CLARITIN) tablet 10 mg  10 mg Oral DAILY    mirabegron ER (MYRBETRIQ) tablet 50 mg  50 mg Oral DAILY    mirtazapine (REMERON) tablet 15 mg  15 mg Oral QHS    sertraline (ZOLOFT) tablet 50 mg  50 mg Oral DAILY    simvastatin (ZOCOR) tablet 20 mg  20 mg Oral QHS    therapeutic multivitamin (THERAGRAN) tablet 1 Tab  1 Tab Oral DAILY    dorzolamide (TRUSOPT) 2 % ophthalmic solution 1 Drop  1 Drop Both Eyes TID         REVIEW OF SYSTEMS   12 point ROS negative except as stated in the HPI. Physical Exam:   Visit Vitals  /73 (BP 1 Location: Left arm, BP Patient Position: At rest)   Pulse 60   Temp 97.4 °F (36.3 °C)   Resp 18   Ht 5' 8\" (1.727 m)   Wt 72.4 kg (159 lb 9.8 oz)   SpO2 96%   BMI 24.27 kg/m²       General:  Asleep, NAD   Head:  Normocephalic, without obvious abnormality, atraumatic. Eyes:  Conjunctivae/corneas clear. Nose: Nares normal. Septum midline. Mucosa normal.    Throat: Lips, mucosa, and tongue normal.    Neck: Supple, symmetrical, trachea midline, no adenopathy. Lungs:   Clear to auscultation bilaterally. Resp non labored. Chest wall:  No tenderness or deformity. Heart:  Regular rate and rhythm, S1, S2 normal, no murmur, click, rub or gallop. Abdomen:   Soft, non-tender. Bowel sounds normal.   Extremities: Extremities normal, atraumatic, no cyanosis; no LE edema   Pulses: 2+ and symmetric all extremities. Skin: Skin color, texture, turgor normal. No rashes or lesions   Lymph nodes: Cervical, supraclavicular nodes normal.   Neurologic: Grossly nonfocal       Lab Results   Component Value Date/Time    Sodium 142 11/01/2019 04:05 AM    Potassium 3.5 11/01/2019 04:05 AM    Chloride 107 11/01/2019 04:05 AM    CO2 28 11/01/2019 04:05 AM    BUN 48 (H) 11/01/2019 04:05 AM    Creatinine 2.33 (H) 11/01/2019 04:05 AM    Glucose 112 (H) 11/01/2019 04:05 AM    Calcium 8.7 11/01/2019 04:05 AM    Magnesium 2.1 11/01/2019 04:05 AM    Phosphorus 3.4 11/01/2019 04:05 AM       Lab Results   Component Value Date/Time    WBC 5.9 11/01/2019 04:05 AM    HGB 10.4 (L) 11/01/2019 04:05 AM    PLATELET 858 (L) 69/21/1539 04:05 AM    .3 (H) 11/01/2019 04:05 AM       Lab Results   Component Value Date/Time    AST (SGOT) 21 11/01/2019 04:05 AM    Alk.  phosphatase 70 11/01/2019 04:05 AM    Protein, total 6.8 11/01/2019 04:05 AM    Albumin 3.2 (L) 11/01/2019 04:05 AM    Globulin 3.6 11/01/2019 04:05 AM       No results found for: IRON, FE, TIBC, IBCT, PSAT, FERR    No results found for: SR, CRP, DARIN, ANAIGG, RA, RPR, RPRT, VDRLT, VDRLS, TSH, TSHEXT, TSHEXT     No results found for: PH, PHI, PCO2, PCO2I, PO2, PO2I, HCO3, HCO3I, FIO2, FIO2I    Lab Results   Component Value Date/Time    Troponin-I, Qt. <0.05 10/29/2019 07:29 AM        Lab Results   Component Value Date/Time    Culture result: NO GROWTH 3 DAYS 10/29/2019 07:29 AM    Culture result: NO GROWTH 3 DAYS 10/29/2019 07:29 AM       No results found for: TOXA1, RPR, HBCM, HBSAG, HAAB, HCAB1, HAAT, G6PD, CRYAC, HIVGT, HIVR, HIV1, HIV12, HIVPC, HIVRPI    No results found for: VANCT, CPK    Lab Results   Component Value Date/Time    Color YELLOW/STRAW 10/29/2019 08:00 AM    Appearance CLEAR 10/29/2019 08:00 AM    pH (UA) 5.0 10/29/2019 08:00 AM    Protein 30 (A) 10/29/2019 08:00 AM    Glucose NEGATIVE  10/29/2019 08:00 AM    Ketone NEGATIVE  10/29/2019 08:00 AM    Bilirubin NEGATIVE  10/29/2019 08:00 AM    Blood TRACE (A) 10/29/2019 08:00 AM    Urobilinogen 0.2 10/29/2019 08:00 AM    Nitrites NEGATIVE  10/29/2019 08:00 AM    Leukocyte Esterase NEGATIVE  10/29/2019 08:00 AM    WBC 0-4 10/29/2019 08:00 AM    RBC 0-5 10/29/2019 08:00 AM    Bacteria NEGATIVE  10/29/2019 08:00 AM       IMPRESSION  · Acute hypoxic and hypercapnic respiratory failure - improved/resolved  · Pulmonary edema  · DELVIS/CKD  · Afib, s/p PPM    PLAN  · Wean off O2 as able for goal sats >90%  · Diuresis per Cardiology/primary team; holding further Lasix for now given worsening creat  · Continue Doxy po for 5 days. Can d/c Zosyn. · Speech following: mild/mod dysphagia. Continuing current cardiac diet with thins. Could consider MBS in the future if issues worsen pending goals of care. · Eliquis  · PT    Patient is stable from a pulmonary standpoint. We will sign off and be available as needed. Please call with questions.       Brinda Colin Leena Haines, PA normal

## 2024-12-30 NOTE — H&P PST ADULT - NSICDXPASTMEDICALHX_GEN_ALL_CORE_FT
PAST MEDICAL HISTORY:  Anxiety     Breast lump     Endometriosis     Fibrocystic breast     Fibromyalgia     Herniated lumbar intervertebral disc     Hyperlipidemia     Migraines     Other specified disorders of breast     Ovarian cyst     Psoriasis     Tibial fracture     Unspecified benign mammary dysplasia of left breast     Uterine fibroid     Uterine polyp

## 2024-12-31 PROCEDURE — 86901 BLOOD TYPING SEROLOGIC RH(D): CPT

## 2024-12-31 PROCEDURE — G0463: CPT

## 2024-12-31 PROCEDURE — 80048 BASIC METABOLIC PNL TOTAL CA: CPT

## 2024-12-31 PROCEDURE — 36415 COLL VENOUS BLD VENIPUNCTURE: CPT

## 2024-12-31 PROCEDURE — 85610 PROTHROMBIN TIME: CPT

## 2024-12-31 PROCEDURE — 86900 BLOOD TYPING SEROLOGIC ABO: CPT

## 2024-12-31 PROCEDURE — 85027 COMPLETE CBC AUTOMATED: CPT

## 2024-12-31 PROCEDURE — 85730 THROMBOPLASTIN TIME PARTIAL: CPT

## 2024-12-31 PROCEDURE — 86850 RBC ANTIBODY SCREEN: CPT

## 2025-01-02 ENCOUNTER — APPOINTMENT (OUTPATIENT)
Dept: UROLOGY | Facility: CLINIC | Age: 50
End: 2025-01-02

## 2025-01-03 ENCOUNTER — NON-APPOINTMENT (OUTPATIENT)
Age: 50
End: 2025-01-03

## 2025-01-06 ENCOUNTER — APPOINTMENT (OUTPATIENT)
Dept: UROLOGY | Facility: CLINIC | Age: 50
End: 2025-01-06

## 2025-01-08 ENCOUNTER — TRANSCRIPTION ENCOUNTER (OUTPATIENT)
Age: 50
End: 2025-01-08

## 2025-01-08 ENCOUNTER — OUTPATIENT (OUTPATIENT)
Dept: OUTPATIENT SERVICES | Facility: HOSPITAL | Age: 50
LOS: 1 days | End: 2025-01-08
Payer: MEDICARE

## 2025-01-08 ENCOUNTER — APPOINTMENT (OUTPATIENT)
Dept: OBGYN | Facility: HOSPITAL | Age: 50
End: 2025-01-08

## 2025-01-08 VITALS
HEIGHT: 59 IN | WEIGHT: 110.01 LBS | DIASTOLIC BLOOD PRESSURE: 86 MMHG | SYSTOLIC BLOOD PRESSURE: 135 MMHG | TEMPERATURE: 98 F | HEART RATE: 84 BPM | OXYGEN SATURATION: 97 % | RESPIRATION RATE: 16 BRPM

## 2025-01-08 VITALS
TEMPERATURE: 99 F | RESPIRATION RATE: 17 BRPM | HEART RATE: 89 BPM | DIASTOLIC BLOOD PRESSURE: 65 MMHG | OXYGEN SATURATION: 98 % | SYSTOLIC BLOOD PRESSURE: 111 MMHG

## 2025-01-08 DIAGNOSIS — Z01.818 ENCOUNTER FOR OTHER PREPROCEDURAL EXAMINATION: ICD-10-CM

## 2025-01-08 DIAGNOSIS — R93.89 ABNORMAL FINDINGS ON DIAGNOSTIC IMAGING OF OTHER SPECIFIED BODY STRUCTURES: ICD-10-CM

## 2025-01-08 DIAGNOSIS — Z98.1 ARTHRODESIS STATUS: Chronic | ICD-10-CM

## 2025-01-08 DIAGNOSIS — Z98.890 OTHER SPECIFIED POSTPROCEDURAL STATES: Chronic | ICD-10-CM

## 2025-01-08 DIAGNOSIS — S82.90XA UNSPECIFIED FRACTURE OF UNSPECIFIED LOWER LEG, INITIAL ENCOUNTER FOR CLOSED FRACTURE: Chronic | ICD-10-CM

## 2025-01-08 DIAGNOSIS — D25.9 LEIOMYOMA OF UTERUS, UNSPECIFIED: Chronic | ICD-10-CM

## 2025-01-08 DIAGNOSIS — Z98.51 TUBAL LIGATION STATUS: Chronic | ICD-10-CM

## 2025-01-08 DIAGNOSIS — N84.0 POLYP OF CORPUS UTERI: Chronic | ICD-10-CM

## 2025-01-08 LAB — BLD GP AB SCN SERPL QL: SIGNIFICANT CHANGE UP

## 2025-01-08 PROCEDURE — 86900 BLOOD TYPING SEROLOGIC ABO: CPT

## 2025-01-08 PROCEDURE — 88305 TISSUE EXAM BY PATHOLOGIST: CPT

## 2025-01-08 PROCEDURE — 58558 HYSTEROSCOPY BIOPSY: CPT

## 2025-01-08 PROCEDURE — 88305 TISSUE EXAM BY PATHOLOGIST: CPT | Mod: 26

## 2025-01-08 PROCEDURE — 36415 COLL VENOUS BLD VENIPUNCTURE: CPT

## 2025-01-08 PROCEDURE — 86901 BLOOD TYPING SEROLOGIC RH(D): CPT

## 2025-01-08 PROCEDURE — 86850 RBC ANTIBODY SCREEN: CPT

## 2025-01-08 RX ORDER — IBUPROFEN 200 MG
600 TABLET ORAL EVERY 6 HOURS
Refills: 0 | Status: DISCONTINUED | OUTPATIENT
Start: 2025-01-08 | End: 2025-01-22

## 2025-01-08 RX ORDER — ALPRAZOLAM 0.25 MG/1
0.25 TABLET ORAL THREE TIMES A DAY
Refills: 0 | Status: DISCONTINUED | OUTPATIENT
Start: 2025-01-08 | End: 2025-01-08

## 2025-01-08 RX ORDER — ONDANSETRON 4 MG/1
4 TABLET ORAL ONCE
Refills: 0 | Status: COMPLETED | OUTPATIENT
Start: 2025-01-08 | End: 2025-01-08

## 2025-01-08 RX ORDER — ACETAMINOPHEN 80 MG/.8ML
2 SOLUTION/ DROPS ORAL
Qty: 30 | Refills: 0
Start: 2025-01-08 | End: 2025-01-12

## 2025-01-08 RX ORDER — FENTANYL 75 UG/H
25 PATCH, EXTENDED RELEASE TRANSDERMAL
Refills: 0 | Status: DISCONTINUED | OUTPATIENT
Start: 2025-01-08 | End: 2025-01-08

## 2025-01-08 RX ORDER — FENTANYL 75 UG/H
50 PATCH, EXTENDED RELEASE TRANSDERMAL
Refills: 0 | Status: DISCONTINUED | OUTPATIENT
Start: 2025-01-08 | End: 2025-01-08

## 2025-01-08 RX ORDER — ATORVASTATIN CALCIUM 40 MG/1
10 TABLET, FILM COATED ORAL AT BEDTIME
Refills: 0 | Status: DISCONTINUED | OUTPATIENT
Start: 2025-01-08 | End: 2025-01-22

## 2025-01-08 RX ORDER — SODIUM CHLORIDE 9 MG/ML
3 INJECTION, SOLUTION INTRAMUSCULAR; INTRAVENOUS; SUBCUTANEOUS EVERY 8 HOURS
Refills: 0 | Status: DISCONTINUED | OUTPATIENT
Start: 2025-01-08 | End: 2025-01-08

## 2025-01-08 RX ORDER — ACETAMINOPHEN 80 MG/.8ML
750 SOLUTION/ DROPS ORAL ONCE
Refills: 0 | Status: DISCONTINUED | OUTPATIENT
Start: 2025-01-08 | End: 2025-01-08

## 2025-01-08 RX ORDER — SODIUM CHLORIDE 9 MG/ML
1000 INJECTION, SOLUTION INTRAVENOUS
Refills: 0 | Status: DISCONTINUED | OUTPATIENT
Start: 2025-01-08 | End: 2025-01-08

## 2025-01-08 RX ORDER — ATORVASTATIN CALCIUM 40 MG/1
1 TABLET, FILM COATED ORAL
Refills: 0 | DISCHARGE

## 2025-01-08 RX ORDER — IBUPROFEN 200 MG
1 TABLET ORAL
Qty: 20 | Refills: 0
Start: 2025-01-08 | End: 2025-01-12

## 2025-01-08 RX ADMIN — FENTANYL 50 MICROGRAM(S): 75 PATCH, EXTENDED RELEASE TRANSDERMAL at 11:10

## 2025-01-08 RX ADMIN — FENTANYL 25 MICROGRAM(S): 75 PATCH, EXTENDED RELEASE TRANSDERMAL at 10:40

## 2025-01-08 RX ADMIN — ONDANSETRON 4 MILLIGRAM(S): 4 TABLET ORAL at 11:00

## 2025-01-08 RX ADMIN — FENTANYL 50 MICROGRAM(S): 75 PATCH, EXTENDED RELEASE TRANSDERMAL at 11:40

## 2025-01-08 RX ADMIN — SODIUM CHLORIDE 3 MILLILITER(S): 9 INJECTION, SOLUTION INTRAMUSCULAR; INTRAVENOUS; SUBCUTANEOUS at 07:55

## 2025-01-08 RX ADMIN — FENTANYL 50 MICROGRAM(S): 75 PATCH, EXTENDED RELEASE TRANSDERMAL at 10:55

## 2025-01-08 RX ADMIN — FENTANYL 25 MICROGRAM(S): 75 PATCH, EXTENDED RELEASE TRANSDERMAL at 10:25

## 2025-01-08 NOTE — ASU PATIENT PROFILE, ADULT - ACCEPTABLE
Phone Number Called: [phone]    Call outcome: Left detailed message for patient. Informed to call back with any additional questions.    Message: Contacted patient to ensure patient had time to review the [employer] App message Dr. Bloom sent them. If they had any questions to please contact us via phone or [employer] message.    3

## 2025-01-08 NOTE — ASU DISCHARGE PLAN (ADULT/PEDIATRIC) - NS MD DC FALL RISK RISK
For information on Fall & Injury Prevention, visit: [email or web address] OR  [email or web address] OR  https://www.cdc.gov/steadi/patient.html

## 2025-01-08 NOTE — ASU PATIENT PROFILE, ADULT - NS PRO AD INFO GIVEN Y
----- Message from Rebeca Haque sent at 1/16/2019  2:51 PM CST -----  Contact: Mom [phone]  Mom says she needs to come  2 copies of the pt immunization record. Please advise mom when this is ready for .   yes

## 2025-01-08 NOTE — ASU DISCHARGE PLAN (ADULT/PEDIATRIC) - DISCHARGE PLAN IS COMPLETE AND GIVEN TO PATIENT
[facility] - ICU Hospital Medicine  Progress Note    Patient Name: John Wayne  MRN: [id number]  Patient Class: IP- Inpatient   Admission Date: 5/7/2024  Length of Stay: 1 days  Attending Physician: Brittany Mcfarlane MD  Primary Care Provider: ISAC Schmidt MD (Inactive)        Subjective:     Principal Problem:Dilantin toxicity    Interval History:   HPI: 77-year-old male with a past medical history paroxysmal AFib on Eliquis, COPD, CAD, CHF, hypertension and epilepsy who was sent from the Nursing Home to the ED on account of abnormal lab reported as elevated Dilantin level of 38.1. Patient is reported to have mild mental status change. He is unable to give me any history but awake and able to follow commands.  In the ED, he was noted with nystagmus, mild drowsiness and mild bradycardia with a repeat Dilantin level was 36.8.  He is being admitted to the ICU.  ED course: Started on D5 0.9% NS IVF.    5/8-he remains somewhat lethargic but easily arousable in the ICU; he had no complaints when seen on rounds this morning    Objective:     Vital Signs (Most Recent):  Temp: 97.6 °F (36.4 °C) (05/08/24 1207)  Pulse: (!) 46 (05/08/24 1207)  Resp: 17 (05/08/24 1207)  BP: (!) 170/49 (05/08/24 1207)  SpO2: 96 % (05/08/24 1207) Vital Signs (24h Range):  Temp:  [97.3 °F (36.3 °C)-98.1 °F (36.7 °C)] 97.6 °F (36.4 °C)  Pulse:  [46-57] 46  Resp:  [0-21] 17  SpO2:  [84 %-100 %] 96 %  BP: (129-199)/(49-86) 170/49     Weight: 109.8 kg (242 lb 1 oz)  Body mass index is 37.91 kg/m².    Intake/Output Summary (Last 24 hours) at 5/8/2024 1212  Last data filed at 5/8/2024 0616  Gross per 24 hour   Intake 335.86 ml   Output 600 ml   Net -264.14 ml      Physical exam  Constitution-well nourished, normally developed male in NAD  Eyes-PERRL, EOMI  HENT-normocephalic, atraumatic; external ears appear normal; dry mucous membranes  Neck-supple  Respiratory-normal respirations; CTA with good air movement  Heart-RRR; normal S1  "and S2; no murmurs, gallops  Abdomen-soft, nontender, nondistended  Genitourinary-deferred  Musculoskeletal-no joint abnormalities, normal ROM throughout  Skin-warm, dry; no rashes  Neurologic-alert and oriented to person and place; nonfocal exam    Scheduled Meds:   apixaban  5 mg Oral BID    atorvastatin  40 mg Oral QHS    clopidogreL  75 mg Oral Daily    finasteride  5 mg Oral Daily    furosemide  40 mg Oral Daily    levETIRAcetam  1,000 mg Oral BID    lisinopriL  5 mg Oral Daily    metoprolol succinate  25 mg Oral BID    mupirocin   Nasal BID    pantoprazole  40 mg Oral Daily    PHENobarbitaL  97.2 mg Oral Daily    sodium chloride 0.9%  10 mL Intravenous Q6H     Continuous Infusions:   dextrose 5 % and 0.9 % NaCl   Intravenous Continuous   Stopped at 05/08/24 0307     PRN Meds:.  Current Facility-Administered Medications:     metoclopramide, 10 mg, Intravenous, Q6H PRN    sodium chloride 0.9%, 10 mL, Intravenous, PRN    Flushing PICC/Midline Protocol, , , Until Discontinued **AND** sodium chloride 0.9%, 10 mL, Intravenous, Q6H **AND** sodium chloride 0.9%, 10 mL, Intravenous, PRN    Significant Labs: All pertinent labs within the past 24 hours have been reviewed.  CBC:   Recent Labs   Lab 05/07/24 1946   WBC 34.54*   HGB 14.8   HCT 46.6        CMP:   Recent Labs   Lab 05/07/24 1946 05/08/24  0906    146*   K 4.2 3.6   CO2 23 28   BUN 22.0 19.0   CREATININE 1.13 0.86   CALCIUM 9.5 9.5   ALBUMIN 3.6 3.4   BILITOT 0.2 0.3   ALKPHOS 95 91   AST 16 12   ALT 14 11     Magnesium: No results for input(s): "MG" in the last 48 hours.    Significant Imaging:   CXR  Impression:  1. Interim placement right PICC line  2. Cardiomegaly  3. Interim improved aeration of the lungs bilaterally with residual infiltrate or atelectasis at the right lower lung  4. Atherosclerosis  5. Thoracic spondylosis and scoliosis    Assessment/Plan:   Dilantin toxicity  Supportive care  Continue gentle IV Fluid  Cardiac monitoring " in ICU  Dilantin level trending down, 30.7, this a.m.     History of AFib   Currently in sinus bradycardia  Continue Eliquis  Continue Metoprolol   Amiodarone held at admission, resume once Dilantin level therapeutic     COPD  Bronchodilators prn     CAD   Resume Metoprolol, Statin, Plavix     Chronic HFpEF  Continue metoprolol, lisinopril, Lasix 40 mg daily     Hypertension  Metoptolol, lisinopril     Epilepsy  Continue Keppra, phenobarbital  Hold Dilantin    GI prophylaxis:  Pantoprazole  VTE: on Eliquis   Active Diagnoses:    Diagnosis Date Noted POA    PRINCIPAL PROBLEM:  Dilantin toxicity [T42.0X1A] 11/28/2022 Yes      Problems Resolved During this Admission:     VTE Risk Mitigation (From admission, onward)           Ordered     apixaban tablet 5 mg  2 times daily         05/07/24 2232     IP VTE HIGH RISK PATIENT  Once         05/07/24 2031     Place sequential compression device  Until discontinued         05/07/24 2031                Social determinants of health limiting diagnosis/treatment   none         Buddy Cole MD  Department of Hospital Medicine   [facility] - [facility]     : Yes

## 2025-01-08 NOTE — ASU DISCHARGE PLAN (ADULT/PEDIATRIC) - CARE PROVIDER_API CALL
Noel Knowles  Obstetrics and Gynecology  [address] [place] [address], [address] [place] [address]  Phone: [phone]  Fax: [phone]  Follow Up Time: 2 weeks

## 2025-01-08 NOTE — ASU DISCHARGE PLAN (ADULT/PEDIATRIC) - FINANCIAL ASSISTANCE
[facility] provides services at a reduced cost to those who are determined to be eligible through [facility]’s financial assistance program. Information regarding [facility]’s financial assistance program can be found by going to [email or web address] or by calling [phone].

## 2025-01-14 PROBLEM — N63.0 UNSPECIFIED LUMP IN UNSPECIFIED BREAST: Chronic | Status: ACTIVE | Noted: 2024-12-30

## 2025-01-14 PROBLEM — E78.5 HYPERLIPIDEMIA, UNSPECIFIED: Chronic | Status: ACTIVE | Noted: 2024-12-30

## 2025-01-24 ENCOUNTER — OUTPATIENT (OUTPATIENT)
Dept: OUTPATIENT SERVICES | Facility: HOSPITAL | Age: 50
LOS: 1 days | End: 2025-01-24
Payer: MEDICARE

## 2025-01-24 ENCOUNTER — APPOINTMENT (OUTPATIENT)
Dept: MRI IMAGING | Facility: IMAGING CENTER | Age: 50
End: 2025-01-24
Payer: MEDICARE

## 2025-01-24 DIAGNOSIS — Z91.89 OTHER SPECIFIED PERSONAL RISK FACTORS, NOT ELSEWHERE CLASSIFIED: ICD-10-CM

## 2025-01-24 DIAGNOSIS — Z98.51 TUBAL LIGATION STATUS: Chronic | ICD-10-CM

## 2025-01-24 DIAGNOSIS — S82.90XA UNSPECIFIED FRACTURE OF UNSPECIFIED LOWER LEG, INITIAL ENCOUNTER FOR CLOSED FRACTURE: Chronic | ICD-10-CM

## 2025-01-24 DIAGNOSIS — Z98.890 OTHER SPECIFIED POSTPROCEDURAL STATES: Chronic | ICD-10-CM

## 2025-01-24 DIAGNOSIS — N84.0 POLYP OF CORPUS UTERI: Chronic | ICD-10-CM

## 2025-01-24 DIAGNOSIS — D25.9 LEIOMYOMA OF UTERUS, UNSPECIFIED: Chronic | ICD-10-CM

## 2025-01-24 DIAGNOSIS — Z98.1 ARTHRODESIS STATUS: Chronic | ICD-10-CM

## 2025-01-24 PROCEDURE — 77049 MRI BREAST C-+ W/CAD BI: CPT | Mod: 26

## 2025-01-24 PROCEDURE — C8908: CPT

## 2025-01-24 PROCEDURE — A9585: CPT

## 2025-01-24 PROCEDURE — C8937: CPT

## 2025-02-04 ENCOUNTER — APPOINTMENT (OUTPATIENT)
Dept: NEUROLOGY | Facility: CLINIC | Age: 50
End: 2025-02-04
Payer: MEDICARE

## 2025-02-04 VITALS
HEART RATE: 80 BPM | WEIGHT: 116 LBS | BODY MASS INDEX: 24.24 KG/M2 | SYSTOLIC BLOOD PRESSURE: 112 MMHG | DIASTOLIC BLOOD PRESSURE: 75 MMHG

## 2025-02-04 DIAGNOSIS — G43.709 CHRONIC MIGRAINE W/OUT AURA, NOT INTRACTABLE, W/OUT STATUS MIGRAINOSUS: ICD-10-CM

## 2025-02-04 PROCEDURE — 64615 CHEMODENERV MUSC MIGRAINE: CPT

## 2025-02-04 PROCEDURE — 99215 OFFICE O/P EST HI 40 MIN: CPT | Mod: 25

## 2025-02-10 ENCOUNTER — APPOINTMENT (OUTPATIENT)
Dept: SURGICAL ONCOLOGY | Facility: CLINIC | Age: 50
End: 2025-02-10

## 2025-02-10 DIAGNOSIS — Z91.89 OTHER SPECIFIED PERSONAL RISK FACTORS, NOT ELSEWHERE CLASSIFIED: ICD-10-CM

## 2025-02-14 NOTE — H&P PST ADULT - NS PRO AD NO ADVANCE DIRECTIVE
Physical Therapy -  Re-Evaluation     Patient: Felipa Cuadra  Location: [facility]  MRN: [id number]  Today's date: 2/14/2025    HISTORY OF PRESENT ILLNESS     Felipa is a 41 y.o. female admitted on 2/1/2025 with UTI (urinary tract infection) [N39.0]  Lower abdominal pain [R10.30]  Urinary tract infection with hematuria, site unspecified [N39.0, R31.9]. Principal problem is UTI (urinary tract infection).    Past Medical History  Felipa has a past medical history of Anxiety, Depression, and Eating disorder.    History of Present Illness    Patient was seen in the ED 1 week ago for evaluation of abdominal pain and generalized weakness.  Workup revealed acute cystitis.  She was discharged on a course of Macrobid, which patient reports she has been taking.  She presents today for evaluation of persistent abdominal pain.  Patient reports suprapubic pain that radiates to her back.  She reports it is constant.  She reports she is weak and unable to ambulate.  She reports increased frequency of urination.  She reports chills.  She reports nausea without vomiting.  She reports she has not had solid food intake since Christmas.  She reports she has been drinking a lot of Gatorade.  She reports she is currently having black stool and vaginal bleeding x 3 days.  She reports she rarely gets her period.  She has not noticed any blood clots.  She reports she is feeling more depressed lately.  She denies suicidal ideation.  She reports she smokes 4 cigarettes/day.  She reports she quit alcohol use 4 years ago.  She denies fever, syncope, vomiting, chest pain, shortness of breath, constipation, diarrhea.     2/13 -  patient hypotensive with a concerning low MAP, required initiation of a norepinephrine infusion and transfer to the ICU.   PRIOR LEVEL OF FUNCTION AND LIVING ENVIRONMENT     Prior Level of Function      Flowsheet Row Most Recent Value   Dominant Hand right   Ambulation assistive  equipment   Transferring assistive equipment   Toileting independent   Bathing assistive equipment and person   Dressing assistive person   Eating independent   IADLs independent   Prior Level of Function Comment Reports assist from HHAs 6 days/week with bathing + dressing. Uses a walker at home   Assistive Device Currently Used at Home walker, standard, grab bar, shower chair             Prior Living Environment      Flowsheet Row Most Recent Value   People in Home alone   Current Living Arrangements home   Home Accessibility elevator accessible   Living Environment Comment Lives alone in apartment with elevator access. Tub shower with SC + GBs.          VITALS AND PAIN     PT Vitals      Date/Time Pulse HR Source Resp SpO2 O2 Therapy BP MAP BP Location BP Method Pt Position [facility]   02/14/25 1230 -- -- 20 -- -- 99/60 74 mmHg -- -- -- DMF   02/14/25 1230 74 Monitor -- 100 % None (Room air) -- -- Right forearm Automatic Lying AK   02/14/25 1240 77 Monitor -- 100 % None (Room air) 110/57 -- Right forearm Automatic Sitting AK          PT Pain      Date/Time Pain Type Location Rating: Rest Rating: Activity Interventions [facility]   02/14/25 1230 Pain Assessment neck +back 4 - moderate pain 4 - moderate pain care clustered AK             Objective   OBJECTIVE     Start time:  1229  End time:  1245  Session Length: 16 min  Mode of Treatment: co-treatment  Reason for co-treatment: DC planning, PT focus on mobility    General Observations  Patient received reclined, in bed. She was agreeable to therapy, no issues or concerns identified by nurse prior to session.      Precautions: fall       Limitations/Impairments: safety/cognitive   Services  Do You Speak a Language Other Than English at Home?: no      PT Eval and Treat - 02/14/25 1229          Cognition    Orientation Status oriented x 3     Affect/Mental Status flat/blunted affect     Behavioral Issues overwhelmed easily     Follows Commands follows one-step  commands;75-90% accuracy;increased processing time needed;repetition of directions required;verbal cues/prompting required     Cognitive Function safety deficit;executive function deficit     Executive Function Deficit moderate deficit;organization/sequencing;information processing;planning/decision-making;self-monitoring/self-correction     Safety Deficit moderate deficit;problem-solving;insight into deficits/self-awareness     Comment, Cognition Cooperative, anxious w/ mobility, self limiting d/t reports of fear of falling        Vision Assessment/Intervention    Vision Assessment corrective lenses full-time        Hearing Assessment    Hearing Status WFL        Sensory Assessment    Sensory Assessment sensation intact, lower extremities        Lower Extremity Range of Motion    Hip, Left (ROM) WFL     Knee, Left (ROM) WFL     Ankle, Left (ROM) WFL     Hip, Right (ROM) WFL     Knee, Right (ROM) WFL     Ankle, Right (ROM) WFL        Lower Extremity Strength    Hip, Left (Strength) 3/5     Knee, Left (Strength) 3+/5     Ankle, Left (Strength) 3+/5     Hip, Right (Strength) 3/5     Knee, Right (Strength) 3+/5     Ankle, Right (Strength) 3+/5        Bed Mobility    Bed Mobility Activities left;supine to sit;sit to supine     [place] moderate assist (50-74% patient effort);1 person assist;maximum assist (25-49% patient effort);2 person assist     Safety/Cues increased time to complete;verbal cues;technique     Assistive Device head of bed elevated;bed rails;draw sheet     Comment Min A rolling R/L, Mod A x1 supine>sit, Max A x2 sit>supine.        Mobility Belt    Mobility Belt Used During Session no - patient refused        Sit/Stand Transfer    Surface edge of bed     [place] maximum assist (25-49% patient effort);2 person assist     Safety/Cues increased time to complete;verbal cues;technique;hand placement     Assistive Device other (see comments)   HHA x2    Transfer Comments [place] knee block, attempted STS  but unable to clear the bed        Gait Training    [place], Gait not tested        Balance    Static Sitting Balance mild impairment;sitting, edge of bed     Dynamic Sitting Balance moderate impairment;sitting, edge of bed     Sit to Stand Dynamic Balance severe impairment;supported     Static Standing Balance unable to perform activity     Dynamic Standing Balance unable to perform activity     Comment, Balance Min A static sitting balance 2/2 ataxic and uncontrolled jerky movements, attempted STS but pt unable to clear the bed. Report dizziness upon sitting EOB - VSS.        Impairments/Safety Issues    Impairments Affecting Function balance;strength;cognition;coordination;endurance/activity tolerance;postural/trunk control     Cognitive Impairments, Safety/Performance problem-solving/reasoning;safety precaution awareness;awareness, need for assistance;insight into deficits/self-awareness;judgment;sequencing abilities     Functional Endurance Fair (-), self limiting     Safety Issues Affecting Function insight into deficits/self-awareness;problem-solving;sequencing abilities;safety precaution awareness;judgment;awareness of need for assistance                                              Education Documentation  Joint Mobility/Strength, taught by Keri Banuelos PT at 2/14/2025  1:21 PM.  Learner: Patient  Readiness: Acceptance  Method: Explanation  Response: Verbalizes Understanding, Needs Reinforcement  Comment: PT POC, d/c recs, use of call bell, staff assist for safety w/ mobility    Fall Prevention, taught by Keri Banuelos PT at 2/14/2025  1:21 PM.  Learner: Patient  Readiness: Acceptance  Method: Explanation  Response: Verbalizes Understanding, Needs Reinforcement  Comment: PT POC, d/c recs, use of call bell, staff assist for safety w/ mobility    Call Light Use, taught by Keri Banuelos PT at 2/14/2025  1:21 PM.  Learner: Patient  Readiness: Acceptance  Method: Explanation  Response: Verbalizes  "Understanding, Needs Reinforcement  Comment: PT POC, d/c recs, use of call bell, staff assist for safety w/ mobility        Session Outcome  Patient reclined, in bed at end of session, bed alarm on, all needs met, call light in reach, personal items in reach. Nursing notified about patient's performance, patient's position, and patient's response to therapy/activity.    AM-PAC - Mobility (Current Function)     Turning form your back to your side while in flat bed without using bedrails 3 - A Little   Moving from lying on your back to sitting on the side of a flat bed without using bedrails 2 - A Lot   Moving to and from a bed to a chair 1 - Total   Standing up from a chair using your arms 1 - Total   To walk in a hospital room 1 - Total   Climbing 3-5 steps with a railing 1 - Total   AM-PAC Mobility Score 9      ASSESSMENT AND PLAN     Progress Summary  Pt see for PT re-eval. [facility] 9/24. Req Mod A x1 for supine>sit, Max A x2 for sit>supine. Min A required for sitting balance d/t ataxic and uncontrolled jerky movements. Reported dizziness sitting EOB, VSS. Attempted STS, but pt unable to clear the bed. Pt presents w/ deficits in strength, balance, and endurance. Pt appears self-limiting and becomes anxious w/ mobility d/t reports of fear of falling. Will benefit from skilled PT services to maximize functional ind/safety. Recommend d/c to SNF once medically stable.    Patient/Family Therapy Goals Statement: \"go to rehab, skilled nursing home\"    PT Plan      Flowsheet Row Most Recent Value   Rehab Potential fair, will monitor progress closely at 02/11/2025 1353   Therapy Frequency 3 times/wk at 02/11/2025 1353   Planned Therapy Interventions balance training, bed mobility training, gait training, patient/family education, strengthening, transfer training at 02/12/2025 1358            PT Discharge Recommendations      Flowsheet Row Most Recent Value   PT Recommended Discharge Disposition skilled nursing facility at " 02/12/2025 1358   Anticipated Equipment Needs if Discharged Home (PT) none at 02/11/2025 1353                 PT Goals      Flowsheet Row Most Recent Value   Bed Mobility Goal 1    Activity/Assistive Device sit to supine/supine to sit at 02/04/2025 1402   [place] modified independence at 02/04/2025 1402   Time Frame by discharge at 02/04/2025 1402   Progress/Outcome goal ongoing at 02/04/2025 1402   Transfer Goal 1    Activity/Assistive Device sit-to-stand/stand-to-sit, bed-to-chair/chair-to-bed, walker, front-wheeled at 02/04/2025 1402   [place] supervision required at 02/04/2025 1402   Time Frame by discharge at 02/04/2025 1402   Progress/Outcome goal ongoing at 02/04/2025 1402   Gait Training Goal 1    Activity/Assistive Device gait (walking locomotion), assistive device use, walker, front-wheeled at 02/04/2025 1402   [place] supervision required at 02/04/2025 1402   Distance 25 ft at 02/04/2025 1402   Time Frame by discharge at 02/04/2025 1402   Progress/Outcome goal ongoing at 02/04/2025 1402           No

## 2025-04-02 ENCOUNTER — TRANSCRIPTION ENCOUNTER (OUTPATIENT)
Age: 50
End: 2025-04-02

## 2025-04-02 RX ORDER — FLUCONAZOLE 150 MG/1
150 TABLET ORAL
Qty: 1 | Refills: 0 | Status: ACTIVE | COMMUNITY
Start: 2025-04-02 | End: 1900-01-01

## 2025-04-03 ENCOUNTER — APPOINTMENT (OUTPATIENT)
Dept: UROLOGY | Facility: CLINIC | Age: 50
End: 2025-04-03
Payer: MEDICARE

## 2025-04-03 VITALS
TEMPERATURE: 98 F | OXYGEN SATURATION: 96 % | DIASTOLIC BLOOD PRESSURE: 82 MMHG | HEIGHT: 58 IN | SYSTOLIC BLOOD PRESSURE: 117 MMHG | RESPIRATION RATE: 16 BRPM | HEART RATE: 80 BPM | WEIGHT: 116 LBS | BODY MASS INDEX: 24.35 KG/M2

## 2025-04-03 DIAGNOSIS — T14.8XXA OTHER INJURY OF UNSPECIFIED BODY REGION, INITIAL ENCOUNTER: ICD-10-CM

## 2025-04-03 DIAGNOSIS — B37.31 ACUTE CANDIDIASIS OF VULVA AND VAGINA: ICD-10-CM

## 2025-04-03 DIAGNOSIS — N30.10 INTERSTITIAL CYSTITIS (CHRONIC) W/OUT HEMATURIA: ICD-10-CM

## 2025-04-03 PROCEDURE — 51700 IRRIGATION OF BLADDER: CPT

## 2025-04-03 PROCEDURE — 99213 OFFICE O/P EST LOW 20 MIN: CPT | Mod: 25

## 2025-04-03 RX ORDER — TERCONAZOLE 8 MG/G
0.8 CREAM VAGINAL
Qty: 1 | Refills: 3 | Status: ACTIVE | COMMUNITY
Start: 2025-04-03 | End: 1900-01-01

## 2025-04-10 ENCOUNTER — OUTPATIENT (OUTPATIENT)
Dept: OUTPATIENT SERVICES | Facility: HOSPITAL | Age: 50
LOS: 1 days | Discharge: HOME | End: 2025-04-10

## 2025-04-10 DIAGNOSIS — Z98.1 ARTHRODESIS STATUS: Chronic | ICD-10-CM

## 2025-04-10 DIAGNOSIS — N84.0 POLYP OF CORPUS UTERI: Chronic | ICD-10-CM

## 2025-04-10 DIAGNOSIS — D25.9 LEIOMYOMA OF UTERUS, UNSPECIFIED: Chronic | ICD-10-CM

## 2025-04-10 DIAGNOSIS — Z98.890 OTHER SPECIFIED POSTPROCEDURAL STATES: Chronic | ICD-10-CM

## 2025-04-10 DIAGNOSIS — S82.90XA UNSPECIFIED FRACTURE OF UNSPECIFIED LOWER LEG, INITIAL ENCOUNTER FOR CLOSED FRACTURE: Chronic | ICD-10-CM

## 2025-04-10 DIAGNOSIS — Z98.51 TUBAL LIGATION STATUS: Chronic | ICD-10-CM

## 2025-04-11 ENCOUNTER — APPOINTMENT (OUTPATIENT)
Dept: HEMATOLOGY ONCOLOGY | Facility: CLINIC | Age: 50
End: 2025-04-11
Payer: MEDICARE

## 2025-04-11 VITALS
DIASTOLIC BLOOD PRESSURE: 75 MMHG | BODY MASS INDEX: 25.11 KG/M2 | HEART RATE: 81 BPM | WEIGHT: 120.15 LBS | TEMPERATURE: 98.2 F | RESPIRATION RATE: 16 BRPM | SYSTOLIC BLOOD PRESSURE: 110 MMHG | OXYGEN SATURATION: 98 %

## 2025-04-11 DIAGNOSIS — Z91.89 OTHER SPECIFIED PERSONAL RISK FACTORS, NOT ELSEWHERE CLASSIFIED: ICD-10-CM

## 2025-04-11 PROCEDURE — 99214 OFFICE O/P EST MOD 30 MIN: CPT

## 2025-04-11 PROCEDURE — G2211 COMPLEX E/M VISIT ADD ON: CPT

## 2025-04-18 ENCOUNTER — APPOINTMENT (OUTPATIENT)
Dept: UROLOGY | Facility: CLINIC | Age: 50
End: 2025-04-18

## 2025-04-25 ENCOUNTER — APPOINTMENT (OUTPATIENT)
Dept: UROLOGY | Facility: CLINIC | Age: 50
End: 2025-04-25
Payer: MEDICARE

## 2025-04-25 VITALS
TEMPERATURE: 98 F | WEIGHT: 120 LBS | HEIGHT: 58 IN | HEART RATE: 90 BPM | RESPIRATION RATE: 16 BRPM | DIASTOLIC BLOOD PRESSURE: 86 MMHG | SYSTOLIC BLOOD PRESSURE: 121 MMHG | BODY MASS INDEX: 25.19 KG/M2

## 2025-04-25 DIAGNOSIS — N30.10 INTERSTITIAL CYSTITIS (CHRONIC) W/OUT HEMATURIA: ICD-10-CM

## 2025-04-25 DIAGNOSIS — R10.2 PELVIC AND PERINEAL PAIN: ICD-10-CM

## 2025-04-25 DIAGNOSIS — R39.89 OTHER SYMPTOMS AND SIGNS INVOLVING THE GENITOURINARY SYSTEM: ICD-10-CM

## 2025-04-25 PROCEDURE — 51700 IRRIGATION OF BLADDER: CPT

## 2025-05-08 ENCOUNTER — APPOINTMENT (OUTPATIENT)
Dept: NEUROLOGY | Facility: CLINIC | Age: 50
End: 2025-05-08
Payer: MEDICARE

## 2025-05-08 VITALS
WEIGHT: 120 LBS | BODY MASS INDEX: 25.19 KG/M2 | SYSTOLIC BLOOD PRESSURE: 126 MMHG | HEIGHT: 58 IN | HEART RATE: 70 BPM | DIASTOLIC BLOOD PRESSURE: 78 MMHG

## 2025-05-08 DIAGNOSIS — G43.709 CHRONIC MIGRAINE W/OUT AURA, NOT INTRACTABLE, W/OUT STATUS MIGRAINOSUS: ICD-10-CM

## 2025-05-08 PROCEDURE — 64615 CHEMODENERV MUSC MIGRAINE: CPT

## 2025-05-08 PROCEDURE — 99215 OFFICE O/P EST HI 40 MIN: CPT | Mod: 25

## 2025-05-12 NOTE — ASU PATIENT PROFILE, ADULT - FALL HARM RISK - PATIENT NEEDS ASSISTANCE
Patient here for bilateral breast biopsy. NN reviewed the health history, allergies and medications.  , Catalino came with patient.  Radiologist reviews procedure with patient, consent signed. Patient tolerates procedure well. Compression held. Site cleansed with chloraprep, steri strips and dry dressing applied. Ice pack in place. Reviewed discharge instructions with patient and signed copy. Patient verbalized understanding and agreed to contact Nurse Navigator with any questions. Patient A&Ox3, steady on feet and discharged home.     No assistance needed

## 2025-05-15 ENCOUNTER — APPOINTMENT (OUTPATIENT)
Dept: UROLOGY | Facility: CLINIC | Age: 50
End: 2025-05-15

## 2025-05-29 ENCOUNTER — APPOINTMENT (OUTPATIENT)
Dept: CARDIOLOGY | Facility: CLINIC | Age: 50
End: 2025-05-29
Payer: MEDICARE

## 2025-05-29 VITALS — SYSTOLIC BLOOD PRESSURE: 117 MMHG | HEART RATE: 88 BPM | DIASTOLIC BLOOD PRESSURE: 82 MMHG | OXYGEN SATURATION: 96 %

## 2025-05-29 PROCEDURE — 99215 OFFICE O/P EST HI 40 MIN: CPT

## 2025-05-29 RX ORDER — FENOFIBRATE 145 MG/1
145 TABLET, COATED ORAL
Qty: 90 | Refills: 3 | Status: ACTIVE | COMMUNITY
Start: 2025-05-29 | End: 1900-01-01

## 2025-06-02 ENCOUNTER — APPOINTMENT (OUTPATIENT)
Dept: UROLOGY | Facility: CLINIC | Age: 50
End: 2025-06-02

## 2025-06-02 ENCOUNTER — OUTPATIENT (OUTPATIENT)
Dept: OUTPATIENT SERVICES | Facility: HOSPITAL | Age: 50
LOS: 1 days | End: 2025-06-02
Payer: MEDICARE

## 2025-06-02 ENCOUNTER — APPOINTMENT (OUTPATIENT)
Dept: UROLOGY | Facility: CLINIC | Age: 50
End: 2025-06-02
Payer: MEDICARE

## 2025-06-02 VITALS — SYSTOLIC BLOOD PRESSURE: 121 MMHG | HEART RATE: 86 BPM | DIASTOLIC BLOOD PRESSURE: 76 MMHG

## 2025-06-02 DIAGNOSIS — N84.0 POLYP OF CORPUS UTERI: Chronic | ICD-10-CM

## 2025-06-02 DIAGNOSIS — R35.0 FREQUENCY OF MICTURITION: ICD-10-CM

## 2025-06-02 DIAGNOSIS — N30.10 INTERSTITIAL CYSTITIS (CHRONIC) W/OUT HEMATURIA: ICD-10-CM

## 2025-06-02 DIAGNOSIS — S82.90XA UNSPECIFIED FRACTURE OF UNSPECIFIED LOWER LEG, INITIAL ENCOUNTER FOR CLOSED FRACTURE: Chronic | ICD-10-CM

## 2025-06-02 DIAGNOSIS — D25.9 LEIOMYOMA OF UTERUS, UNSPECIFIED: Chronic | ICD-10-CM

## 2025-06-02 DIAGNOSIS — Z98.890 OTHER SPECIFIED POSTPROCEDURAL STATES: Chronic | ICD-10-CM

## 2025-06-02 PROCEDURE — 51700 IRRIGATION OF BLADDER: CPT

## 2025-06-03 ENCOUNTER — NON-APPOINTMENT (OUTPATIENT)
Age: 50
End: 2025-06-03

## 2025-06-03 ENCOUNTER — TRANSCRIPTION ENCOUNTER (OUTPATIENT)
Age: 50
End: 2025-06-03

## 2025-06-03 DIAGNOSIS — N30.10 INTERSTITIAL CYSTITIS (CHRONIC) WITHOUT HEMATURIA: ICD-10-CM

## 2025-06-03 LAB
APPEARANCE: CLEAR
BACTERIA: NEGATIVE /HPF
BILIRUBIN URINE: NEGATIVE
BLOOD URINE: NEGATIVE
CAST: 0 /LPF
COLOR: YELLOW
EPITHELIAL CELLS: 1 /HPF
GLUCOSE QUALITATIVE U: NEGATIVE MG/DL
KETONES URINE: NEGATIVE MG/DL
LEUKOCYTE ESTERASE URINE: NEGATIVE
MICROSCOPIC-UA: NORMAL
NITRITE URINE: NEGATIVE
PH URINE: 5.5
PROTEIN URINE: NEGATIVE MG/DL
RED BLOOD CELLS URINE: 1 /HPF
SPECIFIC GRAVITY URINE: 1.02
UROBILINOGEN URINE: 0.2 MG/DL
WHITE BLOOD CELLS URINE: 0 /HPF

## 2025-06-04 LAB — BACTERIA UR CULT: NORMAL

## 2025-06-16 ENCOUNTER — OUTPATIENT (OUTPATIENT)
Dept: OUTPATIENT SERVICES | Facility: HOSPITAL | Age: 50
LOS: 1 days | End: 2025-06-16
Payer: MEDICARE

## 2025-06-16 ENCOUNTER — APPOINTMENT (OUTPATIENT)
Dept: UROLOGY | Facility: CLINIC | Age: 50
End: 2025-06-16
Payer: MEDICARE

## 2025-06-16 VITALS — SYSTOLIC BLOOD PRESSURE: 121 MMHG | HEART RATE: 84 BPM | DIASTOLIC BLOOD PRESSURE: 84 MMHG

## 2025-06-16 DIAGNOSIS — R35.0 FREQUENCY OF MICTURITION: ICD-10-CM

## 2025-06-16 DIAGNOSIS — Z98.890 OTHER SPECIFIED POSTPROCEDURAL STATES: Chronic | ICD-10-CM

## 2025-06-16 DIAGNOSIS — N84.0 POLYP OF CORPUS UTERI: Chronic | ICD-10-CM

## 2025-06-16 DIAGNOSIS — Z98.51 TUBAL LIGATION STATUS: Chronic | ICD-10-CM

## 2025-06-16 DIAGNOSIS — S82.90XA UNSPECIFIED FRACTURE OF UNSPECIFIED LOWER LEG, INITIAL ENCOUNTER FOR CLOSED FRACTURE: Chronic | ICD-10-CM

## 2025-06-16 DIAGNOSIS — D25.9 LEIOMYOMA OF UTERUS, UNSPECIFIED: Chronic | ICD-10-CM

## 2025-06-16 DIAGNOSIS — Z98.1 ARTHRODESIS STATUS: Chronic | ICD-10-CM

## 2025-06-16 PROCEDURE — 51700 IRRIGATION OF BLADDER: CPT

## 2025-06-16 PROCEDURE — 99213 OFFICE O/P EST LOW 20 MIN: CPT | Mod: 25

## 2025-06-16 RX ORDER — MIRABEGRON 50 MG/1
50 TABLET, EXTENDED RELEASE ORAL
Qty: 30 | Refills: 3 | Status: ACTIVE | COMMUNITY
Start: 2025-06-16 | End: 1900-01-01

## 2025-06-17 DIAGNOSIS — N30.90 CYSTITIS, UNSPECIFIED WITHOUT HEMATURIA: ICD-10-CM

## 2025-06-17 DIAGNOSIS — T14.8XXA OTHER INJURY OF UNSPECIFIED BODY REGION, INITIAL ENCOUNTER: ICD-10-CM

## 2025-06-17 DIAGNOSIS — R10.2 PELVIC AND PERINEAL PAIN: ICD-10-CM

## 2025-06-17 DIAGNOSIS — N30.10 INTERSTITIAL CYSTITIS (CHRONIC) WITHOUT HEMATURIA: ICD-10-CM

## 2025-07-01 ENCOUNTER — NON-APPOINTMENT (OUTPATIENT)
Age: 50
End: 2025-07-01

## 2025-07-14 ENCOUNTER — APPOINTMENT (OUTPATIENT)
Dept: UROLOGY | Facility: CLINIC | Age: 50
End: 2025-07-14
Payer: MEDICARE

## 2025-07-14 ENCOUNTER — OUTPATIENT (OUTPATIENT)
Dept: OUTPATIENT SERVICES | Facility: HOSPITAL | Age: 50
LOS: 1 days | End: 2025-07-14
Payer: MEDICARE

## 2025-07-14 VITALS
SYSTOLIC BLOOD PRESSURE: 120 MMHG | OXYGEN SATURATION: 98 % | HEART RATE: 82 BPM | TEMPERATURE: 97.8 F | RESPIRATION RATE: 15 BRPM | DIASTOLIC BLOOD PRESSURE: 78 MMHG

## 2025-07-14 DIAGNOSIS — R35.0 FREQUENCY OF MICTURITION: ICD-10-CM

## 2025-07-14 DIAGNOSIS — N84.0 POLYP OF CORPUS UTERI: Chronic | ICD-10-CM

## 2025-07-14 DIAGNOSIS — S82.90XA UNSPECIFIED FRACTURE OF UNSPECIFIED LOWER LEG, INITIAL ENCOUNTER FOR CLOSED FRACTURE: Chronic | ICD-10-CM

## 2025-07-14 DIAGNOSIS — D25.9 LEIOMYOMA OF UTERUS, UNSPECIFIED: Chronic | ICD-10-CM

## 2025-07-14 DIAGNOSIS — Z98.1 ARTHRODESIS STATUS: Chronic | ICD-10-CM

## 2025-07-14 DIAGNOSIS — Z98.890 OTHER SPECIFIED POSTPROCEDURAL STATES: Chronic | ICD-10-CM

## 2025-07-14 PROCEDURE — 99213 OFFICE O/P EST LOW 20 MIN: CPT | Mod: 25

## 2025-07-14 PROCEDURE — 51700 IRRIGATION OF BLADDER: CPT

## 2025-07-14 RX ORDER — VIBEGRON 75 MG/1
75 TABLET, FILM COATED ORAL
Qty: 1 | Refills: 3 | Status: ACTIVE | COMMUNITY
Start: 2025-07-14 | End: 1900-01-01

## 2025-07-15 ENCOUNTER — APPOINTMENT (OUTPATIENT)
Dept: ULTRASOUND IMAGING | Facility: CLINIC | Age: 50
End: 2025-07-15
Payer: MEDICARE

## 2025-07-15 ENCOUNTER — APPOINTMENT (OUTPATIENT)
Dept: MAMMOGRAPHY | Facility: CLINIC | Age: 50
End: 2025-07-15
Payer: MEDICARE

## 2025-07-15 ENCOUNTER — RESULT REVIEW (OUTPATIENT)
Age: 50
End: 2025-07-15

## 2025-07-15 DIAGNOSIS — R39.89 OTHER SYMPTOMS AND SIGNS INVOLVING THE GENITOURINARY SYSTEM: ICD-10-CM

## 2025-07-15 DIAGNOSIS — N30.90 CYSTITIS, UNSPECIFIED WITHOUT HEMATURIA: ICD-10-CM

## 2025-07-15 DIAGNOSIS — R32 UNSPECIFIED URINARY INCONTINENCE: ICD-10-CM

## 2025-07-15 DIAGNOSIS — R10.2 PELVIC AND PERINEAL PAIN: ICD-10-CM

## 2025-07-15 PROCEDURE — 77066 DX MAMMO INCL CAD BI: CPT

## 2025-07-15 PROCEDURE — G0279: CPT

## 2025-07-15 PROCEDURE — 76641 ULTRASOUND BREAST COMPLETE: CPT | Mod: 50

## 2025-07-28 ENCOUNTER — APPOINTMENT (OUTPATIENT)
Dept: UROLOGY | Facility: CLINIC | Age: 50
End: 2025-07-28

## 2025-07-28 ENCOUNTER — NON-APPOINTMENT (OUTPATIENT)
Age: 50
End: 2025-07-28

## 2025-07-28 ENCOUNTER — APPOINTMENT (OUTPATIENT)
Dept: ORTHOPEDIC SURGERY | Facility: CLINIC | Age: 50
End: 2025-07-28
Payer: MEDICARE

## 2025-07-28 VITALS
OXYGEN SATURATION: 98 % | HEART RATE: 80 BPM | HEIGHT: 59 IN | BODY MASS INDEX: 23.18 KG/M2 | SYSTOLIC BLOOD PRESSURE: 110 MMHG | WEIGHT: 115 LBS | DIASTOLIC BLOOD PRESSURE: 76 MMHG

## 2025-07-28 DIAGNOSIS — M79.10 MYALGIA, UNSPECIFIED SITE: ICD-10-CM

## 2025-07-28 DIAGNOSIS — M43.16 SPONDYLOLISTHESIS, LUMBAR REGION: ICD-10-CM

## 2025-07-28 DIAGNOSIS — M60.9 MYOSITIS, UNSPECIFIED: ICD-10-CM

## 2025-07-28 PROCEDURE — 99214 OFFICE O/P EST MOD 30 MIN: CPT | Mod: 25

## 2025-07-28 PROCEDURE — 72100 X-RAY EXAM L-S SPINE 2/3 VWS: CPT

## 2025-07-28 PROCEDURE — 20552 NJX 1/MLT TRIGGER POINT 1/2: CPT

## 2025-07-28 RX ORDER — DICLOFENAC SODIUM 75 MG/1
75 TABLET, DELAYED RELEASE ORAL
Qty: 60 | Refills: 1 | Status: ACTIVE | COMMUNITY
Start: 2025-07-28 | End: 1900-01-01

## 2025-07-30 ENCOUNTER — TRANSCRIPTION ENCOUNTER (OUTPATIENT)
Age: 50
End: 2025-07-30

## 2025-07-31 ENCOUNTER — TRANSCRIPTION ENCOUNTER (OUTPATIENT)
Age: 50
End: 2025-07-31

## 2025-07-31 RX ORDER — TIZANIDINE HYDROCHLORIDE 2 MG/1
2 CAPSULE ORAL
Qty: 60 | Refills: 0 | Status: ACTIVE | COMMUNITY
Start: 2025-07-31 | End: 1900-01-01

## 2025-08-01 ENCOUNTER — TRANSCRIPTION ENCOUNTER (OUTPATIENT)
Age: 50
End: 2025-08-01

## 2025-08-06 ENCOUNTER — APPOINTMENT (OUTPATIENT)
Dept: UROLOGY | Facility: CLINIC | Age: 50
End: 2025-08-06

## 2025-08-07 ENCOUNTER — APPOINTMENT (OUTPATIENT)
Dept: NEUROLOGY | Facility: CLINIC | Age: 50
End: 2025-08-07
Payer: MEDICARE

## 2025-08-07 VITALS
HEIGHT: 59 IN | SYSTOLIC BLOOD PRESSURE: 126 MMHG | BODY MASS INDEX: 23.99 KG/M2 | HEART RATE: 82 BPM | WEIGHT: 119 LBS | DIASTOLIC BLOOD PRESSURE: 84 MMHG

## 2025-08-07 DIAGNOSIS — G43.709 CHRONIC MIGRAINE W/OUT AURA, NOT INTRACTABLE, W/OUT STATUS MIGRAINOSUS: ICD-10-CM

## 2025-08-07 PROCEDURE — 99215 OFFICE O/P EST HI 40 MIN: CPT | Mod: 25

## 2025-08-07 PROCEDURE — 64615 CHEMODENERV MUSC MIGRAINE: CPT

## (undated) DEVICE — STAPLER SKIN VISI-STAT 35 WIDE

## (undated) DEVICE — DRAPE 1/2 SHEET 40X57"

## (undated) DEVICE — DRAPE 3/4 SHEET 52X76"

## (undated) DEVICE — BLADE SURGICAL #15 CARBON

## (undated) DEVICE — MARKER SKIN MULTI TIP 6"

## (undated) DEVICE — POSITIONER STRAP ARMBOARD VELCRO TS-30 12

## (undated) DEVICE — CANISTER DISPOSABLE THIN WALL 3000CC

## (undated) DEVICE — GLV 7.5 PROTEXIS NEUTHERA

## (undated) DEVICE — WARMING BLANKET UPPER ADULT

## (undated) DEVICE — TUBING STRYKER HYSTEROSCOPY INFLOW OUTFLOW

## (undated) DEVICE — LABEL MED BLANK W PEN

## (undated) DEVICE — SPONGE LAP X-RAY DETECTABLE 18X18"

## (undated) DEVICE — URETERAL CATH RED RUBBER 16FR (ORANGE)

## (undated) DEVICE — GOWN LG

## (undated) DEVICE — SYR LUER LOK 10CC

## (undated) DEVICE — BLANKET WARMER FULL UNDERBODY

## (undated) DEVICE — GLV 7.5 PROTEXIS (WHITE)

## (undated) DEVICE — SOL IRR SORBITOL 3% 3000ML

## (undated) DEVICE — SUT POLYSORB 2-0 30" GS-21 UNDYED

## (undated) DEVICE — DRSG MAMMARY SUPPORT LG SIZE 4

## (undated) DEVICE — PACK MINOR WITH LAP

## (undated) DEVICE — PREP BETADINE KIT

## (undated) DEVICE — DRSG TELFA 3 X 8

## (undated) DEVICE — DRSG 4X4

## (undated) DEVICE — TUBING ASPIRATION SET MICROAIRE 12FT

## (undated) DEVICE — DRAPE LEGGINGS 6" CUFF 28X43"

## (undated) DEVICE — PACK MINOR

## (undated) DEVICE — PACK PERI GYN

## (undated) DEVICE — WRAP COMPRESSION CALF MED

## (undated) DEVICE — GLV 8 PROTEXIS

## (undated) DEVICE — SUT SOFSILK 2-0 18" C-23

## (undated) DEVICE — SOL IRR POUR NS 0.9% 500ML

## (undated) DEVICE — SOL INJ NS 0.9% 500ML 1-PORT

## (undated) DEVICE — SUT MONOCRYL 4-0 27" PS-2 UNDYED

## (undated) DEVICE — MEDICATION LABELS W MARKER

## (undated) DEVICE — SUT DERMABOND PRINEO 60CM

## (undated) DEVICE — DRAPE HALF SHEET 40X57"

## (undated) DEVICE — LAP PAD W RING 18 X 18"

## (undated) DEVICE — ELCTR PENCIL NEPTUNE SMOKE EVACUATION

## (undated) DEVICE — VENODYNE/SCD SLEEVE CALF MEDIUM

## (undated) DEVICE — SYR LUER LOK 50CC

## (undated) DEVICE — DRAPE INSTRUMENT POUCH

## (undated) DEVICE — SUT MONOCRYL 3-0 27" PS-2 UNDYED

## (undated) DEVICE — DRSG COMBINE 5X9"

## (undated) DEVICE — ELCTR CUTTING LOOP MONOPOLAR ANGLED 24F

## (undated) DEVICE — DRSG MAMMARY SUPPORT MED SIZE 3

## (undated) DEVICE — DRAPE CHEST BREAST 106X122"

## (undated) DEVICE — TUBING TUR 2 PRONG

## (undated) DEVICE — DRAPE TOWEL BLUE 17" X 24"

## (undated) DEVICE — SOL IRR POUR H2O 500ML

## (undated) DEVICE — DRSG TEGADERM 1.75X1.75"

## (undated) DEVICE — DRSG STERISTRIPS 0.5X4"

## (undated) DEVICE — GAMMA SLEEVE DISPOSABLE

## (undated) DEVICE — SOL IRR POUR NS 0.9% 1500ML

## (undated) DEVICE — DRAPE SPLIT SHEETS 77X108"

## (undated) DEVICE — SOL IRR POUR H2O 250ML

## (undated) DEVICE — DRSG MAMMARY SUPPORT MED SIZE 2

## (undated) DEVICE — CONTAINER SPECIMEN 100ML

## (undated) DEVICE — BLANKET WARMER LOWER ADULT

## (undated) DEVICE — TUBING SET TUR BLADDER IRRIGATION Y-TYPE 81"

## (undated) DEVICE — DRAPE LIGHT HANDLE COVER (BLUE)

## (undated) DEVICE — SUT PLAIN GUT 5-0 18" G-3